# Patient Record
Sex: MALE | Race: WHITE | NOT HISPANIC OR LATINO | Employment: OTHER | URBAN - METROPOLITAN AREA
[De-identification: names, ages, dates, MRNs, and addresses within clinical notes are randomized per-mention and may not be internally consistent; named-entity substitution may affect disease eponyms.]

---

## 2019-10-29 ENCOUNTER — HOSPITAL ENCOUNTER (INPATIENT)
Facility: HOSPITAL | Age: 84
LOS: 9 days | Discharge: NURSING, REHAB OR LONG-TERM CARE | DRG: 871 | End: 2019-11-07
Attending: EMERGENCY MEDICINE | Admitting: INTERNAL MEDICINE
Payer: MEDICARE

## 2019-10-29 DIAGNOSIS — A41.9 SEPSIS, DUE TO UNSPECIFIED ORGANISM, UNSPECIFIED WHETHER ACUTE ORGAN DYSFUNCTION PRESENT: ICD-10-CM

## 2019-10-29 DIAGNOSIS — J43.1 PANLOBULAR EMPHYSEMA: Chronic | ICD-10-CM

## 2019-10-29 DIAGNOSIS — J69.0 ASPIRATION PNEUMONIA OF RIGHT LOWER LOBE DUE TO GASTRIC SECRETIONS: ICD-10-CM

## 2019-10-29 DIAGNOSIS — I95.9 HYPOTENSION: ICD-10-CM

## 2019-10-29 DIAGNOSIS — R79.89 ELEVATED TROPONIN: ICD-10-CM

## 2019-10-29 DIAGNOSIS — T45.511A POISONING BY WARFARIN SODIUM, ACCIDENTAL OR UNINTENTIONAL, INITIAL ENCOUNTER: ICD-10-CM

## 2019-10-29 DIAGNOSIS — R53.81 PHYSICAL DECONDITIONING: ICD-10-CM

## 2019-10-29 DIAGNOSIS — E87.4 METABOLIC ACIDOSIS WITH RESPIRATORY ACIDOSIS: ICD-10-CM

## 2019-10-29 DIAGNOSIS — G20.A1 PARKINSON DISEASE: Chronic | ICD-10-CM

## 2019-10-29 DIAGNOSIS — R06.02 SOB (SHORTNESS OF BREATH): ICD-10-CM

## 2019-10-29 DIAGNOSIS — J96.02 ACUTE RESPIRATORY FAILURE WITH HYPOXIA AND HYPERCAPNIA: ICD-10-CM

## 2019-10-29 DIAGNOSIS — I48.91 ATRIAL FIBRILLATION WITH RVR: ICD-10-CM

## 2019-10-29 DIAGNOSIS — J96.01 ACUTE RESPIRATORY FAILURE WITH HYPOXIA AND HYPERCAPNIA: ICD-10-CM

## 2019-10-29 DIAGNOSIS — J18.9 PNEUMONIA OF BOTH LOWER LOBES DUE TO INFECTIOUS ORGANISM: Primary | ICD-10-CM

## 2019-10-29 DIAGNOSIS — J96.01 ACUTE RESPIRATORY FAILURE WITH HYPOXIA: ICD-10-CM

## 2019-10-29 PROBLEM — Z99.11 ON MECHANICALLY ASSISTED VENTILATION: Status: ACTIVE | Noted: 2019-10-29

## 2019-10-29 LAB
ALBUMIN SERPL BCP-MCNC: 4.1 G/DL (ref 3.5–5.2)
ALLENS TEST: ABNORMAL
ALP SERPL-CCNC: 139 U/L (ref 55–135)
ALT SERPL W/O P-5'-P-CCNC: 23 U/L (ref 10–44)
ANION GAP SERPL CALC-SCNC: 8 MMOL/L (ref 8–16)
AST SERPL-CCNC: 42 U/L (ref 10–40)
BACTERIA #/AREA URNS HPF: ABNORMAL /HPF
BASOPHILS # BLD AUTO: 0.01 K/UL (ref 0–0.2)
BASOPHILS NFR BLD: 0.1 % (ref 0–1.9)
BILIRUB SERPL-MCNC: 1.3 MG/DL (ref 0.1–1)
BILIRUB UR QL STRIP: NEGATIVE
BNP SERPL-MCNC: 353 PG/ML (ref 0–99)
BUN SERPL-MCNC: 38 MG/DL (ref 8–23)
CALCIUM SERPL-MCNC: 9.3 MG/DL (ref 8.7–10.5)
CHLORIDE SERPL-SCNC: 110 MMOL/L (ref 95–110)
CLARITY UR: CLEAR
CO2 SERPL-SCNC: 25 MMOL/L (ref 23–29)
COLOR UR: YELLOW
CREAT SERPL-MCNC: 1.3 MG/DL (ref 0.5–1.4)
DELSYS: ABNORMAL
DIFFERENTIAL METHOD: ABNORMAL
EOSINOPHIL # BLD AUTO: 0.2 K/UL (ref 0–0.5)
EOSINOPHIL NFR BLD: 2 % (ref 0–8)
EP: 8
EP: 8
ERYTHROCYTE [DISTWIDTH] IN BLOOD BY AUTOMATED COUNT: 14 % (ref 11.5–14.5)
ERYTHROCYTE [SEDIMENTATION RATE] IN BLOOD BY WESTERGREN METHOD: 18 MM/H
ERYTHROCYTE [SEDIMENTATION RATE] IN BLOOD BY WESTERGREN METHOD: 35 MM/H
EST. GFR  (AFRICAN AMERICAN): 57.1 ML/MIN/1.73 M^2
EST. GFR  (NON AFRICAN AMERICAN): 49.4 ML/MIN/1.73 M^2
FIO2: 45
FIO2: 80
FIO2: 90
GLUCOSE SERPL-MCNC: 101 MG/DL (ref 70–110)
GLUCOSE UR QL STRIP: NEGATIVE
HCO3 UR-SCNC: 20 MMOL/L (ref 24–28)
HCO3 UR-SCNC: 23.1 MMOL/L (ref 24–28)
HCO3 UR-SCNC: 23.2 MMOL/L (ref 24–28)
HCT VFR BLD AUTO: 44.5 % (ref 40–54)
HGB BLD-MCNC: 14.1 G/DL (ref 14–18)
HGB UR QL STRIP: ABNORMAL
HYALINE CASTS #/AREA URNS LPF: 1 /LPF
IMM GRANULOCYTES # BLD AUTO: 0.01 K/UL (ref 0–0.04)
IMM GRANULOCYTES NFR BLD AUTO: 0.1 % (ref 0–0.5)
INR PPP: 2.9 (ref 0.8–1.2)
IP: 16
IP: 16
KETONES UR QL STRIP: NEGATIVE
LACTATE SERPL-SCNC: 2.5 MMOL/L (ref 0.5–2.2)
LACTATE SERPL-SCNC: 2.7 MMOL/L (ref 0.5–2.2)
LEUKOCYTE ESTERASE UR QL STRIP: NEGATIVE
LYMPHOCYTES # BLD AUTO: 0.5 K/UL (ref 1–4.8)
LYMPHOCYTES NFR BLD: 5.3 % (ref 18–48)
MCH RBC QN AUTO: 32.6 PG (ref 27–31)
MCHC RBC AUTO-ENTMCNC: 31.7 G/DL (ref 32–36)
MCV RBC AUTO: 103 FL (ref 82–98)
MICROSCOPIC COMMENT: ABNORMAL
MIN VOL: 31
MODE: ABNORMAL
MONOCYTES # BLD AUTO: 0.2 K/UL (ref 0.3–1)
MONOCYTES NFR BLD: 1.9 % (ref 4–15)
NEUTROPHILS # BLD AUTO: 8.2 K/UL (ref 1.8–7.7)
NEUTROPHILS NFR BLD: 90.6 % (ref 38–73)
NITRITE UR QL STRIP: NEGATIVE
NRBC BLD-RTO: 0 /100 WBC
PCO2 BLDA: 41.7 MMHG (ref 35–45)
PCO2 BLDA: 53.4 MMHG (ref 35–45)
PCO2 BLDA: 58.8 MMHG (ref 35–45)
PEEP: 5
PH SMN: 7.2 [PH] (ref 7.35–7.45)
PH SMN: 7.24 [PH] (ref 7.35–7.45)
PH SMN: 7.29 [PH] (ref 7.35–7.45)
PH UR STRIP: 6 [PH] (ref 5–8)
PLATELET # BLD AUTO: 155 K/UL (ref 150–350)
PMV BLD AUTO: 9.4 FL (ref 9.2–12.9)
PO2 BLDA: 223 MMHG (ref 80–100)
PO2 BLDA: 87 MMHG (ref 80–100)
PO2 BLDA: 89 MMHG (ref 80–100)
POC BE: -4 MMOL/L
POC BE: -5 MMOL/L
POC BE: -7 MMOL/L
POC SATURATED O2: 100 % (ref 95–100)
POC SATURATED O2: 94 % (ref 95–100)
POC SATURATED O2: 95 % (ref 95–100)
POTASSIUM SERPL-SCNC: 4.7 MMOL/L (ref 3.5–5.1)
PROCALCITONIN SERPL IA-MCNC: 1.49 NG/ML
PROT SERPL-MCNC: 7.8 G/DL (ref 6–8.4)
PROT UR QL STRIP: ABNORMAL
PROTHROMBIN TIME: 30.3 SEC (ref 9–12.5)
RBC # BLD AUTO: 4.33 M/UL (ref 4.6–6.2)
RBC #/AREA URNS HPF: >100 /HPF (ref 0–4)
SAMPLE: ABNORMAL
SITE: ABNORMAL
SODIUM SERPL-SCNC: 143 MMOL/L (ref 136–145)
SP GR UR STRIP: >=1.03 (ref 1–1.03)
SPONT RATE: 36
SQUAMOUS #/AREA URNS HPF: 2 /HPF
TROPONIN I SERPL DL<=0.01 NG/ML-MCNC: 0.03 NG/ML (ref 0–0.03)
TROPONIN I SERPL DL<=0.01 NG/ML-MCNC: 0.16 NG/ML (ref 0–0.03)
URN SPEC COLLECT METH UR: ABNORMAL
UROBILINOGEN UR STRIP-ACNC: 1 EU/DL
VT: 450
WBC # BLD AUTO: 9.08 K/UL (ref 3.9–12.7)
WBC #/AREA URNS HPF: 3 /HPF (ref 0–5)

## 2019-10-29 PROCEDURE — 27000190 HC CPAP FULL FACE MASK W/VALVE: Mod: ER

## 2019-10-29 PROCEDURE — 99285 EMERGENCY DEPT VISIT HI MDM: CPT | Mod: 25,ER

## 2019-10-29 PROCEDURE — 84484 ASSAY OF TROPONIN QUANT: CPT | Mod: 91,ER

## 2019-10-29 PROCEDURE — 96365 THER/PROPH/DIAG IV INF INIT: CPT | Mod: ER

## 2019-10-29 PROCEDURE — 87205 SMEAR GRAM STAIN: CPT

## 2019-10-29 PROCEDURE — 99291 PR CRITICAL CARE, E/M 30-74 MINUTES: ICD-10-PCS | Mod: ,,, | Performed by: NURSE PRACTITIONER

## 2019-10-29 PROCEDURE — 80053 COMPREHEN METABOLIC PANEL: CPT | Mod: ER

## 2019-10-29 PROCEDURE — 51702 INSERT TEMP BLADDER CATH: CPT | Mod: ER

## 2019-10-29 PROCEDURE — 25000242 PHARM REV CODE 250 ALT 637 W/ HCPCS: Performed by: FAMILY MEDICINE

## 2019-10-29 PROCEDURE — 63600175 PHARM REV CODE 636 W HCPCS: Performed by: FAMILY MEDICINE

## 2019-10-29 PROCEDURE — 82803 BLOOD GASES ANY COMBINATION: CPT

## 2019-10-29 PROCEDURE — 94002 VENT MGMT INPAT INIT DAY: CPT | Mod: ER

## 2019-10-29 PROCEDURE — 25000003 PHARM REV CODE 250: Performed by: INTERNAL MEDICINE

## 2019-10-29 PROCEDURE — 82803 BLOOD GASES ANY COMBINATION: CPT | Mod: ER

## 2019-10-29 PROCEDURE — 99900035 HC TECH TIME PER 15 MIN (STAT)

## 2019-10-29 PROCEDURE — 93010 ELECTROCARDIOGRAM REPORT: CPT | Mod: ,,, | Performed by: INTERNAL MEDICINE

## 2019-10-29 PROCEDURE — 93010 EKG 12-LEAD: ICD-10-PCS | Mod: ,,, | Performed by: INTERNAL MEDICINE

## 2019-10-29 PROCEDURE — 85025 COMPLETE CBC W/AUTO DIFF WBC: CPT | Mod: ER

## 2019-10-29 PROCEDURE — 25000003 PHARM REV CODE 250: Mod: ER | Performed by: EMERGENCY MEDICINE

## 2019-10-29 PROCEDURE — 63600175 PHARM REV CODE 636 W HCPCS: Mod: ER | Performed by: EMERGENCY MEDICINE

## 2019-10-29 PROCEDURE — 94640 AIRWAY INHALATION TREATMENT: CPT

## 2019-10-29 PROCEDURE — 99900035 HC TECH TIME PER 15 MIN (STAT): Mod: ER

## 2019-10-29 PROCEDURE — 25000242 PHARM REV CODE 250 ALT 637 W/ HCPCS: Performed by: NURSE PRACTITIONER

## 2019-10-29 PROCEDURE — 94640 AIRWAY INHALATION TREATMENT: CPT | Mod: ER

## 2019-10-29 PROCEDURE — 81000 URINALYSIS NONAUTO W/SCOPE: CPT

## 2019-10-29 PROCEDURE — 83880 ASSAY OF NATRIURETIC PEPTIDE: CPT | Mod: ER

## 2019-10-29 PROCEDURE — 36415 COLL VENOUS BLD VENIPUNCTURE: CPT

## 2019-10-29 PROCEDURE — 96366 THER/PROPH/DIAG IV INF ADDON: CPT | Mod: ER

## 2019-10-29 PROCEDURE — 83605 ASSAY OF LACTIC ACID: CPT | Mod: ER

## 2019-10-29 PROCEDURE — 99900026 HC AIRWAY MAINTENANCE (STAT)

## 2019-10-29 PROCEDURE — 94660 CPAP INITIATION&MGMT: CPT | Mod: ER

## 2019-10-29 PROCEDURE — 25000242 PHARM REV CODE 250 ALT 637 W/ HCPCS: Mod: ER | Performed by: EMERGENCY MEDICINE

## 2019-10-29 PROCEDURE — 36600 WITHDRAWAL OF ARTERIAL BLOOD: CPT | Mod: ER

## 2019-10-29 PROCEDURE — 84145 PROCALCITONIN (PCT): CPT | Mod: ER

## 2019-10-29 PROCEDURE — 20000000 HC ICU ROOM

## 2019-10-29 PROCEDURE — 87070 CULTURE OTHR SPECIMN AEROBIC: CPT

## 2019-10-29 PROCEDURE — 36600 WITHDRAWAL OF ARTERIAL BLOOD: CPT

## 2019-10-29 PROCEDURE — 96375 TX/PRO/DX INJ NEW DRUG ADDON: CPT | Mod: ER

## 2019-10-29 PROCEDURE — 99291 CRITICAL CARE FIRST HOUR: CPT | Mod: ,,, | Performed by: NURSE PRACTITIONER

## 2019-10-29 PROCEDURE — 93005 ELECTROCARDIOGRAM TRACING: CPT | Mod: ER

## 2019-10-29 PROCEDURE — 25000003 PHARM REV CODE 250: Performed by: FAMILY MEDICINE

## 2019-10-29 PROCEDURE — 63600175 PHARM REV CODE 636 W HCPCS: Performed by: NURSE PRACTITIONER

## 2019-10-29 PROCEDURE — 85610 PROTHROMBIN TIME: CPT

## 2019-10-29 PROCEDURE — S0030 INJECTION, METRONIDAZOLE: HCPCS | Performed by: FAMILY MEDICINE

## 2019-10-29 PROCEDURE — S0028 INJECTION, FAMOTIDINE, 20 MG: HCPCS | Performed by: FAMILY MEDICINE

## 2019-10-29 PROCEDURE — 87040 BLOOD CULTURE FOR BACTERIA: CPT

## 2019-10-29 RX ORDER — BRIMONIDINE TARTRATE 1.5 MG/ML
1 SOLUTION/ DROPS OPHTHALMIC 3 TIMES DAILY
COMMUNITY

## 2019-10-29 RX ORDER — SODIUM CHLORIDE, SODIUM LACTATE, POTASSIUM CHLORIDE, CALCIUM CHLORIDE 600; 310; 30; 20 MG/100ML; MG/100ML; MG/100ML; MG/100ML
INJECTION, SOLUTION INTRAVENOUS CONTINUOUS
Status: DISCONTINUED | OUTPATIENT
Start: 2019-10-29 | End: 2019-10-31

## 2019-10-29 RX ORDER — CHLORHEXIDINE GLUCONATE ORAL RINSE 1.2 MG/ML
15 SOLUTION DENTAL 2 TIMES DAILY
Status: DISCONTINUED | OUTPATIENT
Start: 2019-10-29 | End: 2019-10-31

## 2019-10-29 RX ORDER — IPRATROPIUM BROMIDE AND ALBUTEROL SULFATE 2.5; .5 MG/3ML; MG/3ML
3 SOLUTION RESPIRATORY (INHALATION) EVERY 4 HOURS
Status: DISCONTINUED | OUTPATIENT
Start: 2019-10-29 | End: 2019-11-01

## 2019-10-29 RX ORDER — SODIUM CHLORIDE, SODIUM LACTATE, POTASSIUM CHLORIDE, CALCIUM CHLORIDE 600; 310; 30; 20 MG/100ML; MG/100ML; MG/100ML; MG/100ML
INJECTION, SOLUTION INTRAVENOUS CONTINUOUS
Status: DISCONTINUED | OUTPATIENT
Start: 2019-10-29 | End: 2019-10-29

## 2019-10-29 RX ORDER — METOPROLOL TARTRATE 1 MG/ML
5 INJECTION, SOLUTION INTRAVENOUS EVERY 6 HOURS PRN
Status: DISCONTINUED | OUTPATIENT
Start: 2019-10-29 | End: 2019-11-07 | Stop reason: HOSPADM

## 2019-10-29 RX ORDER — WARFARIN SODIUM 5 MG/1
5 TABLET ORAL DAILY
Status: DISCONTINUED | OUTPATIENT
Start: 2019-10-29 | End: 2019-10-30

## 2019-10-29 RX ORDER — CARBIDOPA AND LEVODOPA 25; 100 MG/1; MG/1
TABLET ORAL
Status: ON HOLD | COMMUNITY
Start: 2019-09-12 | End: 2019-11-07 | Stop reason: SDUPTHER

## 2019-10-29 RX ORDER — LISINOPRIL 20 MG/1
TABLET ORAL
Status: ON HOLD | COMMUNITY
Start: 2019-08-26 | End: 2019-11-07 | Stop reason: SDUPTHER

## 2019-10-29 RX ORDER — FLUTICASONE PROPIONATE 110 UG/1
1 AEROSOL, METERED RESPIRATORY (INHALATION) 2 TIMES DAILY
Status: ON HOLD | COMMUNITY
End: 2019-11-17 | Stop reason: HOSPADM

## 2019-10-29 RX ORDER — ALBUTEROL SULFATE 0.83 MG/ML
2.5 SOLUTION RESPIRATORY (INHALATION) EVERY 6 HOURS PRN
Status: ON HOLD | COMMUNITY
End: 2019-11-17 | Stop reason: HOSPADM

## 2019-10-29 RX ORDER — PROPOFOL 10 MG/ML
5 INJECTION, EMULSION INTRAVENOUS
Status: COMPLETED | OUTPATIENT
Start: 2019-10-29 | End: 2019-10-29

## 2019-10-29 RX ORDER — CARBIDOPA AND LEVODOPA 25; 100 MG/1; MG/1
1 TABLET ORAL 3 TIMES DAILY
Status: DISCONTINUED | OUTPATIENT
Start: 2019-10-29 | End: 2019-11-07 | Stop reason: HOSPADM

## 2019-10-29 RX ORDER — DILTIAZEM HYDROCHLORIDE 5 MG/ML
0.25 INJECTION INTRAVENOUS
Status: COMPLETED | OUTPATIENT
Start: 2019-10-29 | End: 2019-10-29

## 2019-10-29 RX ORDER — CHOLECALCIFEROL (VITAMIN D3) 25 MCG
1000 TABLET ORAL DAILY
Status: DISCONTINUED | OUTPATIENT
Start: 2019-10-30 | End: 2019-11-07 | Stop reason: HOSPADM

## 2019-10-29 RX ORDER — ETOMIDATE 2 MG/ML
20 INJECTION INTRAVENOUS
Status: COMPLETED | OUTPATIENT
Start: 2019-10-29 | End: 2019-10-29

## 2019-10-29 RX ORDER — PROPOFOL 10 MG/ML
5 INJECTION, EMULSION INTRAVENOUS CONTINUOUS
Status: DISCONTINUED | OUTPATIENT
Start: 2019-10-29 | End: 2019-10-31

## 2019-10-29 RX ORDER — CEFEPIME HYDROCHLORIDE 2 G/50ML
2 INJECTION, SOLUTION INTRAVENOUS
Status: DISCONTINUED | OUTPATIENT
Start: 2019-10-29 | End: 2019-10-30

## 2019-10-29 RX ORDER — METRONIDAZOLE 500 MG/100ML
500 INJECTION, SOLUTION INTRAVENOUS
Status: DISCONTINUED | OUTPATIENT
Start: 2019-10-29 | End: 2019-11-05

## 2019-10-29 RX ORDER — FUROSEMIDE 20 MG/1
TABLET ORAL
Status: ON HOLD | COMMUNITY
Start: 2019-08-29 | End: 2019-11-07 | Stop reason: SDUPTHER

## 2019-10-29 RX ORDER — LEVOFLOXACIN 5 MG/ML
750 INJECTION, SOLUTION INTRAVENOUS
Status: COMPLETED | OUTPATIENT
Start: 2019-10-29 | End: 2019-10-29

## 2019-10-29 RX ORDER — CHOLECALCIFEROL (VITAMIN D3) 25 MCG
1000 TABLET ORAL DAILY
Status: ON HOLD | COMMUNITY
End: 2019-11-17 | Stop reason: HOSPADM

## 2019-10-29 RX ORDER — IPRATROPIUM BROMIDE AND ALBUTEROL SULFATE 2.5; .5 MG/3ML; MG/3ML
3 SOLUTION RESPIRATORY (INHALATION)
Status: COMPLETED | OUTPATIENT
Start: 2019-10-29 | End: 2019-10-29

## 2019-10-29 RX ORDER — FAMOTIDINE 10 MG/ML
20 INJECTION INTRAVENOUS 2 TIMES DAILY
Status: DISCONTINUED | OUTPATIENT
Start: 2019-10-29 | End: 2019-10-30 | Stop reason: DRUGHIGH

## 2019-10-29 RX ORDER — WARFARIN SODIUM 5 MG/1
TABLET ORAL
Status: ON HOLD | COMMUNITY
Start: 2019-09-13 | End: 2019-11-07 | Stop reason: HOSPADM

## 2019-10-29 RX ORDER — FENTANYL CITRAT/DEXTROSE 5%/PF 100 MCG/10
PATIENT CONTROLLED ANALGESIA SYRINGE INTRAVENOUS CONTINUOUS
Status: DISCONTINUED | OUTPATIENT
Start: 2019-10-29 | End: 2019-10-31

## 2019-10-29 RX ADMIN — WARFARIN SODIUM 5 MG: 5 TABLET ORAL at 09:10

## 2019-10-29 RX ADMIN — FAMOTIDINE 20 MG: 10 INJECTION INTRAVENOUS at 09:10

## 2019-10-29 RX ADMIN — Medication 50 MCG/HR: at 04:10

## 2019-10-29 RX ADMIN — IPRATROPIUM BROMIDE AND ALBUTEROL SULFATE 3 ML: .5; 3 SOLUTION RESPIRATORY (INHALATION) at 07:10

## 2019-10-29 RX ADMIN — LEVOFLOXACIN 750 MG: 750 INJECTION, SOLUTION INTRAVENOUS at 09:10

## 2019-10-29 RX ADMIN — ETOMIDATE 20 MG: 2 INJECTION INTRAVENOUS at 11:10

## 2019-10-29 RX ADMIN — SODIUM CHLORIDE, SODIUM LACTATE, POTASSIUM CHLORIDE, AND CALCIUM CHLORIDE 250 ML: .6; .31; .03; .02 INJECTION, SOLUTION INTRAVENOUS at 06:10

## 2019-10-29 RX ADMIN — PROPOFOL 5 MCG/KG/MIN: 10 INJECTION, EMULSION INTRAVENOUS at 11:10

## 2019-10-29 RX ADMIN — LORAZEPAM 1 MG: 2 INJECTION INTRAMUSCULAR; INTRAVENOUS at 09:10

## 2019-10-29 RX ADMIN — METRONIDAZOLE 500 MG: 500 INJECTION, SOLUTION INTRAVENOUS at 05:10

## 2019-10-29 RX ADMIN — IPRATROPIUM BROMIDE AND ALBUTEROL SULFATE 3 ML: .5; 3 SOLUTION RESPIRATORY (INHALATION) at 11:10

## 2019-10-29 RX ADMIN — CARBIDOPA AND LEVODOPA 1 TABLET: 25; 100 TABLET ORAL at 09:10

## 2019-10-29 RX ADMIN — SODIUM CHLORIDE 1000 ML: 0.9 INJECTION, SOLUTION INTRAVENOUS at 09:10

## 2019-10-29 RX ADMIN — CHLORHEXIDINE GLUCONATE 0.12% ORAL RINSE 15 ML: 1.2 LIQUID ORAL at 09:10

## 2019-10-29 RX ADMIN — PROPOFOL 10 MCG/KG/MIN: 10 INJECTION, EMULSION INTRAVENOUS at 04:10

## 2019-10-29 RX ADMIN — SODIUM CHLORIDE, SODIUM LACTATE, POTASSIUM CHLORIDE, AND CALCIUM CHLORIDE: .6; .31; .03; .02 INJECTION, SOLUTION INTRAVENOUS at 11:10

## 2019-10-29 RX ADMIN — IPRATROPIUM BROMIDE AND ALBUTEROL SULFATE 3 ML: .5; 3 SOLUTION RESPIRATORY (INHALATION) at 08:10

## 2019-10-29 RX ADMIN — CEFEPIME HYDROCHLORIDE 2 G: 2 INJECTION, SOLUTION INTRAVENOUS at 07:10

## 2019-10-29 RX ADMIN — DILTIAZEM HYDROCHLORIDE 19.5 MG: 5 INJECTION INTRAVENOUS at 08:10

## 2019-10-29 RX ADMIN — IPRATROPIUM BROMIDE AND ALBUTEROL SULFATE 3 ML: .5; 3 SOLUTION RESPIRATORY (INHALATION) at 05:10

## 2019-10-29 RX ADMIN — VANCOMYCIN HYDROCHLORIDE 2250 MG: 100 INJECTION, POWDER, LYOPHILIZED, FOR SOLUTION INTRAVENOUS at 06:10

## 2019-10-29 RX ADMIN — SODIUM CHLORIDE, SODIUM LACTATE, POTASSIUM CHLORIDE, AND CALCIUM CHLORIDE: .6; .31; .03; .02 INJECTION, SOLUTION INTRAVENOUS at 06:10

## 2019-10-29 NOTE — PROGRESS NOTES
Patient and spouse arrived per EMS at this time. Patient intubated and sedated. Following commands. NP at bedside. Orders to be implemented at this time. Will continue to monitor and update as necessary. See VS and assessment for details.

## 2019-10-29 NOTE — CONSULTS
[facility] -   Critical Care Medicine  Consult Note    Patient Name: Shaq Huffman  MRN: [id number]  Admission Date: 10/29/2019  Hospital Length of Stay: 0 days  Code Status: No Order  Attending Physician: Josesito Franco MD   Primary Care Provider: Provider Notinsystem   Principal Problem: Acute respiratory failure with hypoxia and hypercapnia      Subjective:     HPI:  Frail appearing 86 year old male with PMH including COPD, atrial fib on coumadin, parkinson's, and glaucoma presented to ED at [facility] via EMS  Pt traveling via [place] boat cruise with significant other from [place]  SO reports complaints of reflux, fullness, and discomfort after dinner last evening; then this am woke with increased coughing and shortly became less responsive to conversation when she called for assistance from onboard EMT  ED evaluation revealed atrial fib with RVR 150s, RR 40s on trial BiPap @ 80%  Intubated after failing trial of BiPap; CXR shows RLL infiltrate concerning for aspiration in addition to coarse interstitial markings suspicious for underlying ILD/fibrosis/or scarring  Lab significant for creatinine 1.3; troponin initial 0.029 sienna to 0.159; ; lactic acid initial 2.7 decreased to 2.5; procalcitonin 1.49; initial abg respiratory acidosis with hypoxemia and hypercapnia    Hospital/ICU Course:  Admitted to ICU with OETT on mechanical ventilation with propofol sedation and marginal BP    Past Medical History:   Diagnosis Date    A-fib     COPD (chronic obstructive pulmonary disease)        No past surgical history on file.    Review of patient's allergies indicates:  No Known Allergies    Family History     None        Tobacco Use    Smoking status: Not on file   Substance and Sexual Activity    Alcohol use: Not on file    Drug use: Not on file    Sexual activity: Not on file         Review of Systems   Unable to perform ROS: Intubated     Objective:     Vital Signs (Most  Recent):  Temp: 99.3 °F (37.4 °C) (10/29/19 1614)  Pulse: 94 (10/29/19 1723)  Resp: (!) 31 (10/29/19 1723)  BP: (!) 87/69 (10/29/19 1715)  SpO2: 98 % (10/29/19 1723) Vital Signs (24h Range):  Temp:  [99.1 °F (37.3 °C)-99.6 °F (37.6 °C)] 99.3 °F (37.4 °C)  Pulse:  [] 94  Resp:  [31-49] 31  SpO2:  [84 %-100 %] 98 %  BP: ()/(55-88) 87/69     Weight: 77.1 kg (170 lb)(unable to stand due to SOB)  There is no height or weight on file to calculate BMI.      Intake/Output Summary (Last 24 hours) at 10/29/2019 1734  Last data filed at 10/29/2019 1730  Gross per 24 hour   Intake 1172.06 ml   Output 195 ml   Net 977.06 ml       Physical Exam   Constitutional: He has a sickly appearance. He appears ill. No distress. He is sedated and intubated.   Frail appearing with barrel chest   HENT:   Head: Atraumatic.   Eyes: Pupils are equal, round, and reactive to light. Conjunctivae are normal.   Neck: No JVD present. No tracheal deviation present.   Cardiovascular: Normal rate. An irregularly irregular rhythm present.   Pulses:       Radial pulses are 2+ on the right side, and 2+ on the left side.        Dorsalis pedis pulses are 2+ on the right side, and 2+ on the left side.   Pulmonary/Chest: He is intubated. He has decreased breath sounds. He has no wheezes. He has rhonchi in the right lower field. He has no rales.   Abdominal: Soft. Bowel sounds are normal. He exhibits no distension.   Musculoskeletal: He exhibits no edema.   Skin: Skin is warm and dry. Capillary refill takes less than 2 seconds. Bruising noted. No cyanosis. Nails show clubbing.       Vents:  Vent Mode: A/C (10/29/19 1723)  Ventilator Initiated: Yes (10/29/19 1143)  Set Rate: 18 bmp (10/29/19 1723)  Vt Set: 450 mL (10/29/19 1723)  Pressure Support: 0 cmH20 (10/29/19 1723)  PEEP/CPAP: 5 cmH20 (10/29/19 1723)  Oxygen Concentration (%): 45 (10/29/19 1723)  Peak Airway Pressure: 23 cmH2O (10/29/19 1723)  Plateau Pressure: 0 cmH20 (10/29/19 1723)  Total Ve:  19.7 mL (10/29/19 1723)  F/VT Ratio<105 (RSBI): (!) (P) 51.16 (10/29/19 1723)    Lines/Drains/Airways     Drain                 NG/OG Tube 10/29/19 1200 [place] sump 16 Fr. Left nostril less than 1 day         Urethral Catheter 10/29/19 1440 16 Fr. less than 1 day          Airway                 Airway - Non-Surgical 10/29/19 1134 Endotracheal Tube less than 1 day          Peripheral Intravenous Line                 Peripheral IV - Single Lumen 10/29/19 0815 20 G Right Forearm less than 1 day         Peripheral IV - Single Lumen 10/29/19 20 G Left Forearm less than 1 day                Significant Labs:    CBC/Anemia Profile:  Recent Labs   Lab 10/29/19  0817   WBC 9.08   HGB 14.1   HCT 44.5      *   RDW 14.0        Chemistries:  Recent Labs   Lab 10/29/19  0817      K 4.7      CO2 25   BUN 38*   CREATININE 1.3   CALCIUM 9.3   ALBUMIN 4.1   PROT 7.8   BILITOT 1.3*   ALKPHOS 139*   ALT 23   AST 42*       Lactic Acid:   Recent Labs   Lab 10/29/19  0918 10/29/19  1254   LACTATE 2.7* 2.5*     Troponin:   Recent Labs   Lab 10/29/19  0817 10/29/19  1446   TROPONINI 0.029* 0.159*     All pertinent labs within the past 24 hours have been reviewed.  ABG  Recent Labs   Lab 10/29/19  1652   PH 7.288*   PO2 87   PCO2 41.7   HCO3 20.0*   BE -7         Significant Imaging:   I have reviewed all pertinent imaging results/findings within the past 24 hours.      ABG  Recent Labs   Lab 10/29/19  1652   PH 7.288*   PO2 87   PCO2 41.7   HCO3 20.0*   BE -7     Assessment/Plan:     Neuro  Parkinson disease  Continue sinemet via NG    Ophtho  Continue home eye drop course    Pulmonary  Panlobular emphysema  No evidence of acute exacerbation  Continue maintenance bronchodilator  Monitor for s/s of exacerbation s/t pneumonia    Aspiration pneumonia of right lower lobe due to gastric secretions  Empiric antimicrobial to start after sputum collected for culture  Monitor temp, wbc, culture info, and cxr daily  Consider  swallow eval post extubation; reported events consistent with a reflux type aspiration however per SO pt has had ST for swallow precautions s/t parkinson's diagnosis but does not strictly follow those precautions    Acute respiratory failure with hypoxia and hypercapnia on mechanical ventilation  S/t pneumonia with underlying COPD  Vent settings reviewed and adjusted per abg  VAP prophylaxis  SAT/SBT in am    Cardiac/Vascular  Elevated troponin  Suspect demand ischemia s/t respiratory distress and atrial fib with RVR   Coumadin anticoagulation  Repeat troponin in am for peak/resolution    Atrial fibrillation with RVR  Rate controlled with optimization of pulmonary distress  ICU cardiac monitoring  INR therapeutic, continue coumadin with pharmacy monitoring and adjustment to maintain anticoagulation    ID  Sepsis  S/t pneumonia  1L IVF in ED;  and lasix home med concerning for heart failure but not endorsed by SO  Will add gentle IV hydration  ICU hemodynamic monitoring  Blood cultures obtained in ED  Send sputum and check UA for completeness  Initiate broad spectrum empiric aspiration pneumonia coverage  Monitor temp, wbc, culture info, and repeat lactate in am        Critical Care Daily Checklist:    A: Awake: RASS Goal/Actual Goal: RASS Goal: -2-->light sedation  Actual:     B: Spontaneous Breathing Trial Performed?     C: SAT & SBT Coordinated?  In am                      D: Delirium: CAM-ICU     E: Early Mobility Performed? ROM   F: Feeding Goal:    Status:     Current Diet Order   Procedures    Diet NPO      AS: Analgesia/Sedation Fentanyl infusion for RASS -2   T: Thromboembolic Prophylaxis coumadin   H: HOB > 300 Yes   U: Stress Ulcer Prophylaxis (if needed) pepcid   G: Glucose Control monitor   B: Bowel Function     I: Indwelling Catheter (Lines & [place]) Necessity reviewed   D: De-escalation of Antimicrobials/Pharmacotherapies reviewed    Plan for the day/ETD As above    Family/Goals of Care: Pending  hospital course anticipate discharge to return home with SO   I have discussed case and plan of care in detail with Dr Fernandez and Dr Guzman; Status and plan of care were discussed with team on multidisciplinary rounds.    Critical Care Time: 65 minutes  Critical secondary to acute hypoxemic and hypercapnic respiratory failure s/t aspiration pneumonia; Critical care was time spent personally by me on the following activities: development of treatment plan with patient or surrogate and bedside caregivers, discussions with consultants, evaluation of patient's response to treatment, examination of patient, ordering and performing treatments and interventions, ordering and review of laboratory studies, ordering and review of radiographic studies, pulse oximetry, re-evaluation of patient's condition. This critical care time did not overlap with that of any other provider or involve time for any procedures.    Thank you for your consult.      JAQUELINE Russell-BC  Critical Care Medicine  [facility]

## 2019-10-29 NOTE — PROGRESS NOTES
[facility] Hospital Medicine  Progress Note    Patient Name: Shaq Huffman  MRN: [id number]  Patient Class: IP- Inpatient   Admission Date: 10/29/2019  Length of Stay: 0 days  Attending Physician: Josesito Franco MD  Primary Care Provider: Provider Notinsystem        Subjective:     Principal Problem:Acute respiratory failure with hypoxia and hypercapnia        HPI:  Patient is an 87 y/o male with a PMH of parkinsons, afib, GERD, COPD presents as transfer from [facility] for respiratory failure. Patient was intubated prior to arrival and history was obtained by partner. She reports her and patient were on a cruise leaving out of [place] and patient developed some breathing difficulty. Cruise healthcare professionals were called patient was taken to [facility]. He was intubated there due to respiratory insufficiency with hypercarbia and transferred to [facility]. She denied any sick contacts or recent hospitalizations.     Overview/Hospital Course:  No notes on file    Past Medical History:   Diagnosis Date    A-fib     COPD (chronic obstructive pulmonary disease)        No past surgical history on file.    Review of patient's allergies indicates:  No Known Allergies    No current facility-administered medications on file prior to encounter.      Current Outpatient Medications on File Prior to Encounter   Medication Sig    albuterol (PROVENTIL) 2.5 mg /3 mL (0.083 %) nebulizer solution Take 2.5 mg by nebulization every 6 (six) hours as needed for Wheezing. Rescue    brimonidine 0.15 % OPTH DROP (ALPHAGAN) 0.15 % ophthalmic solution 1 drop 3 (three) times daily.    brinzolamide/brimonidine tart (SIMBRINZA OPHT) Apply to eye.    carbidopa-levodopa  mg (SINEMET)  mg per tablet     fluticasone propionate (FLOVENT HFA) 110 mcg/actuation inhaler Inhale 1 puff into the lungs 2 (two) times daily. Controller    furosemide (LASIX) 20 MG tablet     lisinopril  (PRINIVIL,ZESTRIL) 20 MG tablet     vit C/vit E ac/lut/copper/zinc (PRESERVISION LUTEIN ORAL) Take by mouth.    vitamin D (VITAMIN D3) 1000 units Tab Take 1,000 Units by mouth once daily.    warfarin (COUMADIN) 5 MG tablet     bevacizumab (AVASTIN) 2.5 mg/0.10 mL injection Inject into the eye.     Family History     None        Tobacco Use    Smoking status: Not on file   Substance and Sexual Activity    Alcohol use: Not on file    Drug use: Not on file    Sexual activity: Not on file     Review of Systems   Unable to perform ROS: Intubated     Objective:     Vital Signs (Most Recent):  Temp: 99.3 °F (37.4 °C) (10/29/19 1614)  Pulse: (!) 118 (10/29/19 1730)  Resp: (!) 31 (10/29/19 1730)  BP: (!) 100/59 (10/29/19 1730)  SpO2: 98 % (10/29/19 1730) Vital Signs (24h Range):  Temp:  [99.1 °F (37.3 °C)-99.6 °F (37.6 °C)] 99.3 °F (37.4 °C)  Pulse:  [] 118  Resp:  [31-49] 31  SpO2:  [84 %-100 %] 98 %  BP: ()/(55-88) 100/59     Weight: 77.1 kg (170 lb)(unable to stand due to SOB)  There is no height or weight on file to calculate BMI.    Physical Exam   Constitutional: He appears well-developed and well-nourished. No distress.   intubated   HENT:   Head: Normocephalic and atraumatic.   Cardiovascular: Exam reveals no gallop and no friction rub.   No murmur heard.  Irregular irregular   Pulmonary/Chest: Effort normal. No stridor. No respiratory distress. He has no wheezes. He has rales (RLL).   Abdominal: Soft. Bowel sounds are normal. He exhibits no distension and no mass. There is no tenderness. There is no guarding.   Musculoskeletal: He exhibits no edema.   Neurological:   sedated   Skin: Skin is warm. No rash noted. He is not diaphoretic.           Significant Labs:   Recent Lab Results       10/29/19  1652   10/29/19  1643   10/29/19  1446   10/29/19  1254   10/29/19  1054        Procalcitonin               Albumin               Alkaline Phosphatase               Allens Test Pass       N/A     ALT                Anion Gap               AST               Baso #               Basophil%               BILIRUBIN TOTAL               BNP               Site RR       RF     BUN, Bld               Calcium               Chloride               CO2               Creatinine               DelSys Adult Vent       CPAP/BiPAP     Differential Method               eGFR if                eGFR if non                Eos #               Eosinophil%               EP         8     FiO2 45       90     Glucose               Gran # (ANC)               Gran%               Hematocrit               Hemoglobin               Immature Grans (Abs)               Immature Granulocytes               Coumadin Monitoring INR   2.9  Comment:  Coumadin Therapy:  2.0 - 3.0 for INR for all indicators except mechanical heart valves  and antiphospholipid syndromes which should use 2.5 - 3.5.             IP         16     Lactate, Giovanni       2.5  Comment:  Falsely low lactic acid results can be found in samples   containing >=13.0 mg/dL total bilirubin and/or >=3.5 mg/dL   direct bilirubin.         Lymph #               Lymph%               MCH               MCHC               MCV               Min Vol               Mode AC/PRVC       BiPAP     Mono #               Mono%               MPV               nRBC               PEEP 5             Platelets               POC BE -7       -4     POC HCO3 20.0       23.1     POC PCO2 41.7       53.4     POC PH 7.288       7.243     POC PO2 87       223     POC SATURATED O2 95       100     Potassium               PROTEIN TOTAL               Protime   30.3           Rate 18       35     RBC               RDW               Sample ARTERIAL       ARTERIAL     Sodium               Spont Rate               Troponin I     0.159  Comment:  The reference interval for Troponin I represents the 99th percentile   cutoff   for our facility and is consistent with 3rd generation assay   performance.            Vt 450             WBC                                10/29/19  0918   10/29/19  0839   10/29/19  0817        Procalcitonin 1.49  Comment:  A concentration < 0.25 ng/mL represents a low risk bacterial   infection.  Procalcitonin may not be accurate among patients with localized   infection, recent trauma or major surgery, immunosuppressed state,   invasive fungal infection, renal dysfunction. Decisions regarding   initiation or continuation of antibiotic therapy should not be based   solely on procalcitonin levels.           Albumin     4.1     Alkaline Phosphatase     139     Allens Test   Pass       ALT     23     Anion Gap     8     AST     42     Baso #     0.01     Basophil%     0.1     BILIRUBIN TOTAL     1.3  Comment:  For infants and newborns, interpretation of results should be based  on gestational age, weight and in agreement with clinical  observations.  Premature Infant recommended reference ranges:  Up to 24 hours.............<8.0 mg/dL  Up to 48 hours............<12.0 mg/dL  3-5 days..................<15.0 mg/dL  6-29 days.................<15.0 mg/dL       BNP     353  Comment:  Values of less than 100 pg/ml are consistent with non-CHF populations.     Site   RR       BUN, Bld     38     Calcium     9.3     Chloride     110     CO2     25     Creatinine     1.3     DelSys   CPAP/BiPAP       Differential Method     Automated     eGFR if      57.1     eGFR if non      49.4  Comment:  Calculation used to obtain the estimated glomerular filtration  rate (eGFR) is the CKD-EPI equation.        Eos #     0.2     Eosinophil%     2.0     EP   8       FiO2   80       Glucose     101     Gran # (ANC)     8.2     Gran%     90.6     Hematocrit     44.5     Hemoglobin     14.1     Immature Grans (Abs)     0.01  Comment:  Mild elevation in immature granulocytes is non specific and   can be seen in a variety of conditions including stress response,   acute inflammation, trauma  and pregnancy. Correlation with other   laboratory and clinical findings is essential.       Immature Granulocytes     0.1     Coumadin Monitoring INR           IP   16       Lactate, Giovanni 2.7  Comment:  Falsely low lactic acid results can be found in samples   containing >=13.0 mg/dL total bilirubin and/or >=3.5 mg/dL   direct bilirubin.           Lymph #     0.5     Lymph%     5.3     MCH     32.6     MCHC     31.7     MCV     103     Min Vol   31       Mode   BiPAP       Mono #     0.2     Mono%     1.9     MPV     9.4     nRBC     0     PEEP           Platelets     155     POC BE   -5       POC HCO3   23.2       POC PCO2   58.8       POC PH   7.204       POC PO2   89       POC SATURATED O2   94       Potassium     4.7     PROTEIN TOTAL     7.8     Protime           Rate           RBC     4.33     RDW     14.0     Sample   ARTERIAL       Sodium     143     Spont Rate   36       Troponin I     0.029  Comment:  The reference interval for Troponin I represents the 99th percentile   cutoff   for our facility and is consistent with 3rd generation assay   performance.       Vt           WBC     9.08           Significant Imaging: I have reviewed all pertinent imaging results/findings within the past 24 hours.      Assessment/Plan:      * Acute respiratory failure with hypoxia and hypercapnia on mechanical ventilation  10/29:  Likely 2/2 to aspiration PNA  Sputum culture pending  Blood culture pending  On vanc, cefepime, and flagyl  Continue to wean vent as tolerated   Reviewed blood gas           Parkinson disease  10/29:  Resume home meds      Atrial fibrillation with RVR  10/29:  Not on rate control medical medications at home  HR ok for now  On coumadin   Pharm to dose   Metoprolol IV PRN         VTE Risk Mitigation (From admission, onward)         Ordered     Place sequential compression device  Until discontinued      10/29/19 1630     IP VTE HIGH RISK PATIENT  Once      10/29/19 1630                Critical care  time spent on the evaluation and treatment of severe organ dysfunction, review of pertinent labs and imaging studies, discussions with consulting providers and discussions with patient/family: 35 minutes.      Allan Guzman MD  Department of Hospital Medicine   [facility]

## 2019-10-29 NOTE — SUBJECTIVE & OBJECTIVE
Past Medical History:   Diagnosis Date    A-fib     COPD (chronic obstructive pulmonary disease)        No past surgical history on file.    Review of patient's allergies indicates:  No Known Allergies    Family History     None        Tobacco Use    Smoking status: Not on file   Substance and Sexual Activity    Alcohol use: Not on file    Drug use: Not on file    Sexual activity: Not on file         Review of Systems   Unable to perform ROS: Intubated     Objective:     Vital Signs (Most Recent):  Temp: 99.3 °F (37.4 °C) (10/29/19 1614)  Pulse: 94 (10/29/19 1723)  Resp: (!) 31 (10/29/19 1723)  BP: (!) 87/69 (10/29/19 1715)  SpO2: 98 % (10/29/19 1723) Vital Signs (24h Range):  Temp:  [99.1 °F (37.3 °C)-99.6 °F (37.6 °C)] 99.3 °F (37.4 °C)  Pulse:  [] 94  Resp:  [31-49] 31  SpO2:  [84 %-100 %] 98 %  BP: ()/(55-88) 87/69     Weight: 77.1 kg (170 lb)(unable to stand due to SOB)  There is no height or weight on file to calculate BMI.      Intake/Output Summary (Last 24 hours) at 10/29/2019 1734  Last data filed at 10/29/2019 1730  Gross per 24 hour   Intake 1172.06 ml   Output 195 ml   Net 977.06 ml       Physical Exam   Constitutional: He has a sickly appearance. He appears ill. No distress. He is sedated and intubated.   Frail appearing with barrel chest   HENT:   Head: Atraumatic.   Eyes: Pupils are equal, round, and reactive to light. Conjunctivae are normal.   Neck: No JVD present. No tracheal deviation present.   Cardiovascular: Normal rate. An irregularly irregular rhythm present.   Pulses:       Radial pulses are 2+ on the right side, and 2+ on the left side.        Dorsalis pedis pulses are 2+ on the right side, and 2+ on the left side.   Pulmonary/Chest: He is intubated. He has decreased breath sounds. He has no wheezes. He has rhonchi in the right lower field. He has no rales.   Abdominal: Soft. Bowel sounds are normal. He exhibits no distension.   Musculoskeletal: He exhibits no edema.    Skin: Skin is warm and dry. Capillary refill takes less than 2 seconds. Bruising noted. No cyanosis. Nails show clubbing.       Vents:  Vent Mode: A/C (10/29/19 1723)  Ventilator Initiated: Yes (10/29/19 1143)  Set Rate: 18 bmp (10/29/19 1723)  Vt Set: 450 mL (10/29/19 1723)  Pressure Support: 0 cmH20 (10/29/19 1723)  PEEP/CPAP: 5 cmH20 (10/29/19 1723)  Oxygen Concentration (%): 45 (10/29/19 1723)  Peak Airway Pressure: 23 cmH2O (10/29/19 1723)  Plateau Pressure: 0 cmH20 (10/29/19 1723)  Total Ve: 19.7 mL (10/29/19 1723)  F/VT Ratio<105 (RSBI): (!) (P) 51.16 (10/29/19 1723)    Lines/Drains/Airways     Drain                 NG/OG Tube 10/29/19 1200 [place] sump 16 Fr. Left nostril less than 1 day         Urethral Catheter 10/29/19 1440 16 Fr. less than 1 day          Airway                 Airway - Non-Surgical 10/29/19 1134 Endotracheal Tube less than 1 day          Peripheral Intravenous Line                 Peripheral IV - Single Lumen 10/29/19 0815 20 G Right Forearm less than 1 day         Peripheral IV - Single Lumen 10/29/19 20 G Left Forearm less than 1 day                Significant Labs:    CBC/Anemia Profile:  Recent Labs   Lab 10/29/19  0817   WBC 9.08   HGB 14.1   HCT 44.5      *   RDW 14.0        Chemistries:  Recent Labs   Lab 10/29/19  0817      K 4.7      CO2 25   BUN 38*   CREATININE 1.3   CALCIUM 9.3   ALBUMIN 4.1   PROT 7.8   BILITOT 1.3*   ALKPHOS 139*   ALT 23   AST 42*       Lactic Acid:   Recent Labs   Lab 10/29/19  0918 10/29/19  1254   LACTATE 2.7* 2.5*     Troponin:   Recent Labs   Lab 10/29/19  0817 10/29/19  1446   TROPONINI 0.029* 0.159*     All pertinent labs within the past 24 hours have been reviewed.  ABG  Recent Labs   Lab 10/29/19  1652   PH 7.288*   PO2 87   PCO2 41.7   HCO3 20.0*   BE -7         Significant Imaging:   I have reviewed all pertinent imaging results/findings within the past 24 hours.

## 2019-10-29 NOTE — ASSESSMENT & PLAN NOTE
S/t pneumonia  1L IVF in ED;  and lasix home med concerning for heart failure but not endorsed by SO  Will add gentle IV hydration  ICU hemodynamic monitoring  Blood cultures obtained in ED  Send sputum and check UA for completeness  Initiate broad spectrum empiric aspiration pneumonia coverage  Monitor temp, wbc, culture info, and repeat lactate in am

## 2019-10-29 NOTE — ASSESSMENT & PLAN NOTE
10/29:  Not on rate control medical medications at home  HR ok for now  On coumadin   Pharm to dose   Metoprolol IV PRN

## 2019-10-29 NOTE — ASSESSMENT & PLAN NOTE
10/29:  Likely 2/2 to aspiration PNA  Sputum culture pending  Blood culture pending  On vanc, cefepime, and flagyl  Continue to wean vent as tolerated   Reviewed blood gas

## 2019-10-29 NOTE — ASSESSMENT & PLAN NOTE
Suspect demand ischemia s/t respiratory distress and atrial fib with RVR   Coumadin anticoagulation  Repeat troponin in am for peak/resolution

## 2019-10-29 NOTE — SUBJECTIVE & OBJECTIVE
Past Medical History:   Diagnosis Date    A-fib     COPD (chronic obstructive pulmonary disease)        No past surgical history on file.    Review of patient's allergies indicates:  No Known Allergies    No current facility-administered medications on file prior to encounter.      Current Outpatient Medications on File Prior to Encounter   Medication Sig    albuterol (PROVENTIL) 2.5 mg /3 mL (0.083 %) nebulizer solution Take 2.5 mg by nebulization every 6 (six) hours as needed for Wheezing. Rescue    brimonidine 0.15 % OPTH DROP (ALPHAGAN) 0.15 % ophthalmic solution 1 drop 3 (three) times daily.    brinzolamide/brimonidine tart (SIMBRINZA OPHT) Apply to eye.    carbidopa-levodopa  mg (SINEMET)  mg per tablet     fluticasone propionate (FLOVENT HFA) 110 mcg/actuation inhaler Inhale 1 puff into the lungs 2 (two) times daily. Controller    furosemide (LASIX) 20 MG tablet     lisinopril (PRINIVIL,ZESTRIL) 20 MG tablet     vit C/vit E ac/lut/copper/zinc (PRESERVISION LUTEIN ORAL) Take by mouth.    vitamin D (VITAMIN D3) 1000 units Tab Take 1,000 Units by mouth once daily.    warfarin (COUMADIN) 5 MG tablet     bevacizumab (AVASTIN) 2.5 mg/0.10 mL injection Inject into the eye.     Family History     None        Tobacco Use    Smoking status: Not on file   Substance and Sexual Activity    Alcohol use: Not on file    Drug use: Not on file    Sexual activity: Not on file     Review of Systems   Unable to perform ROS: Intubated     Objective:     Vital Signs (Most Recent):  Temp: 99.3 °F (37.4 °C) (10/29/19 1614)  Pulse: (!) 118 (10/29/19 1730)  Resp: (!) 31 (10/29/19 1730)  BP: (!) 100/59 (10/29/19 1730)  SpO2: 98 % (10/29/19 1730) Vital Signs (24h Range):  Temp:  [99.1 °F (37.3 °C)-99.6 °F (37.6 °C)] 99.3 °F (37.4 °C)  Pulse:  [] 118  Resp:  [31-49] 31  SpO2:  [84 %-100 %] 98 %  BP: ()/(55-88) 100/59     Weight: 77.1 kg (170 lb)(unable to stand due to SOB)  There is no height or  weight on file to calculate BMI.    Physical Exam   Constitutional: He appears well-developed and well-nourished. No distress.   intubated   HENT:   Head: Normocephalic and atraumatic.   Cardiovascular: Exam reveals no gallop and no friction rub.   No murmur heard.  Irregular irregular   Pulmonary/Chest: Effort normal. No stridor. No respiratory distress. He has no wheezes. He has rales (RLL).   Abdominal: Soft. Bowel sounds are normal. He exhibits no distension and no mass. There is no tenderness. There is no guarding.   Musculoskeletal: He exhibits no edema.   Neurological:   sedated   Skin: Skin is warm. No rash noted. He is not diaphoretic.           Significant Labs:   Recent Lab Results       10/29/19  1652   10/29/19  1643   10/29/19  1446   10/29/19  1254   10/29/19  1054        Procalcitonin               Albumin               Alkaline Phosphatase               Allens Test Pass       N/A     ALT               Anion Gap               AST               Baso #               Basophil%               BILIRUBIN TOTAL               BNP               Site RR       RF     BUN, Bld               Calcium               Chloride               CO2               Creatinine               DelSys Adult Vent       CPAP/BiPAP     Differential Method               eGFR if                eGFR if non                Eos #               Eosinophil%               EP         8     FiO2 45       90     Glucose               Gran # (ANC)               Gran%               Hematocrit               Hemoglobin               Immature Grans (Abs)               Immature Granulocytes               Coumadin Monitoring INR   2.9  Comment:  Coumadin Therapy:  2.0 - 3.0 for INR for all indicators except mechanical heart valves  and antiphospholipid syndromes which should use 2.5 - 3.5.             IP         16     Lactate, Giovanni       2.5  Comment:  Falsely low lactic acid results can be found in samples   containing  >=13.0 mg/dL total bilirubin and/or >=3.5 mg/dL   direct bilirubin.         Lymph #               Lymph%               MCH               MCHC               MCV               Min Vol               Mode AC/PRVC       BiPAP     Mono #               Mono%               MPV               nRBC               PEEP 5             Platelets               POC BE -7       -4     POC HCO3 20.0       23.1     POC PCO2 41.7       53.4     POC PH 7.288       7.243     POC PO2 87       223     POC SATURATED O2 95       100     Potassium               PROTEIN TOTAL               Protime   30.3           Rate 18       35     RBC               RDW               Sample ARTERIAL       ARTERIAL     Sodium               Spont Rate               Troponin I     0.159  Comment:  The reference interval for Troponin I represents the 99th percentile   cutoff   for our facility and is consistent with 3rd generation assay   performance.           Vt 450             WBC                                10/29/19  0918   10/29/19  0839   10/29/19  0817        Procalcitonin 1.49  Comment:  A concentration < 0.25 ng/mL represents a low risk bacterial   infection.  Procalcitonin may not be accurate among patients with localized   infection, recent trauma or major surgery, immunosuppressed state,   invasive fungal infection, renal dysfunction. Decisions regarding   initiation or continuation of antibiotic therapy should not be based   solely on procalcitonin levels.           Albumin     4.1     Alkaline Phosphatase     139     Allens Test   Pass       ALT     23     Anion Gap     8     AST     42     Baso #     0.01     Basophil%     0.1     BILIRUBIN TOTAL     1.3  Comment:  For infants and newborns, interpretation of results should be based  on gestational age, weight and in agreement with clinical  observations.  Premature Infant recommended reference ranges:  Up to 24 hours.............<8.0 mg/dL  Up to 48 hours............<12.0 mg/dL  3-5  days..................<15.0 mg/dL  6-29 days.................<15.0 mg/dL       BNP     353  Comment:  Values of less than 100 pg/ml are consistent with non-CHF populations.     Site   RR       BUN, Bld     38     Calcium     9.3     Chloride     110     CO2     25     Creatinine     1.3     DelSys   CPAP/BiPAP       Differential Method     Automated     eGFR if      57.1     eGFR if non      49.4  Comment:  Calculation used to obtain the estimated glomerular filtration  rate (eGFR) is the CKD-EPI equation.        Eos #     0.2     Eosinophil%     2.0     EP   8       FiO2   80       Glucose     101     Gran # (ANC)     8.2     Gran%     90.6     Hematocrit     44.5     Hemoglobin     14.1     Immature Grans (Abs)     0.01  Comment:  Mild elevation in immature granulocytes is non specific and   can be seen in a variety of conditions including stress response,   acute inflammation, trauma and pregnancy. Correlation with other   laboratory and clinical findings is essential.       Immature Granulocytes     0.1     Coumadin Monitoring INR           IP   16       Lactate, Giovanni 2.7  Comment:  Falsely low lactic acid results can be found in samples   containing >=13.0 mg/dL total bilirubin and/or >=3.5 mg/dL   direct bilirubin.           Lymph #     0.5     Lymph%     5.3     MCH     32.6     MCHC     31.7     MCV     103     Min Vol   31       Mode   BiPAP       Mono #     0.2     Mono%     1.9     MPV     9.4     nRBC     0     PEEP           Platelets     155     POC BE   -5       POC HCO3   23.2       POC PCO2   58.8       POC PH   7.204       POC PO2   89       POC SATURATED O2   94       Potassium     4.7     PROTEIN TOTAL     7.8     Protime           Rate           RBC     4.33     RDW     14.0     Sample   ARTERIAL       Sodium     143     Spont Rate   36       Troponin I     0.029  Comment:  The reference interval for Troponin I represents the 99th percentile   cutoff   for our  facility and is consistent with 3rd generation assay   performance.       Vt           WBC     9.08           Significant Imaging: I have reviewed all pertinent imaging results/findings within the past 24 hours.

## 2019-10-29 NOTE — ASSESSMENT & PLAN NOTE
Rate controlled with optimization of pulmonary distress  ICU cardiac monitoring  INR therapeutic, continue coumadin with pharmacy monitoring and adjustment to maintain anticoagulation

## 2019-10-29 NOTE — HPI
Frail appearing 86 year old male with PMH including COPD, atrial fib on coumadin, parkinson's, and glaucoma presented to ED at [facility] via EMS  Pt traveling via [place] boat cruise with significant other from [place]  SO reports complaints of reflux, fullness, and discomfort after dinner last evening; then this am woke with increased coughing and shortly became less responsive to conversation when she called for assistance from onboard EMT  ED evaluation revealed atrial fib with RVR 150s, RR 40s on trial BiPap @ 80%  Intubated after failing trial of BiPap; CXR shows RLL infiltrate concerning for aspiration in addition to coarse interstitial markings suspicious for underlying ILD/fibrosis/or scarring  Lab significant for creatinine 1.3; troponin initial 0.029 sienna to 0.159; ; lactic acid initial 2.7 decreased to 2.5; procalcitonin 1.49; initial abg respiratory acidosis with hypoxemia and hypercapnia

## 2019-10-29 NOTE — ASSESSMENT & PLAN NOTE
No evidence of acute exacerbation  Continue maintenance bronchodilator  Monitor for s/s of exacerbation s/t pneumonia

## 2019-10-29 NOTE — ASSESSMENT & PLAN NOTE
S/t pneumonia with underlying COPD  Vent settings reviewed and adjusted per abg  VAP prophylaxis  SAT/SBT in am

## 2019-10-29 NOTE — HPI
Patient is an 85 y/o male with a PMH of parkinsons, afib, GERD, COPD presents as transfer from [facility] for respiratory failure. Patient was intubated prior to arrival and history was obtained by partner. She reports her and patient were on a cruise leaving out of [place] and patient developed some breathing difficulty. Cruise healthcare professionals were called [facility] He was intubated there due to respiratory insufficiency with hypercarbia and transferred to [facility]. She denied any sick contacts or recent hospitalizations.

## 2019-10-29 NOTE — ED PROVIDER NOTES
Encounter Date: 10/29/2019       History     Chief Complaint   Patient presents with    Shortness of Breath     Hx COPD. SOB started yesterday     Patient currently presents accompanied by his wife with concerns regarding difficulty breathing.  This began last evening after dinner.  Patient has a history of COPD as well as some atrial fibrillation.  They deny a known history of coronary artery disease or heart failure.  Patient has not had any relief with the nebulizer treatments prior to arrival.  They note that the symptoms have progressed overnight.  They are currently here on a paddleboat cruise from [place].  Patient on CPAP per EMS at arrival.        Review of patient's allergies indicates:  No Known Allergies  Past Medical History:   Diagnosis Date    A-fib     COPD (chronic obstructive pulmonary disease)      No past surgical history on file.  No family history on file.  Social History     Tobacco Use    Smoking status: Not on file   Substance Use Topics    Alcohol use: Not on file    Drug use: Not on file     Review of Systems   Constitutional: Negative for chills and fever.   HENT: Negative for congestion.    Respiratory: Positive for cough and shortness of breath. Negative for chest tightness.    Cardiovascular: Negative for chest pain and leg swelling.   Gastrointestinal: Negative for abdominal pain, constipation, diarrhea, nausea and vomiting.   Genitourinary: Negative for dysuria, frequency and urgency.   Skin: Negative for color change and rash.   Allergic/Immunologic: Negative for immunocompromised state.   Neurological: Negative for dizziness, syncope, weakness, numbness and headaches.   Hematological: Negative for adenopathy. Does not bruise/bleed easily.   All other systems reviewed and are negative.      Physical Exam     Initial Vitals [10/29/19 0807]   BP Pulse Resp Temp SpO2   132/76 (!) 157 (!) 40 99.1 °F (37.3 °C) (!) 89 %      MAP       --         Vitals:    10/29/19 0821 10/29/19  0829 10/29/19 0839 10/29/19 0855   BP:    (!) 113/55   Pulse: (!) 160 (!) 119 110 (!) 117   Resp: (!) 40 (!) 39 (!) 49 (!) 40   Temp:       TempSrc:       SpO2: 97% 97% 95% (!) 94%   Weight:        10/29/19 0917 10/29/19 0946 10/29/19 1040 10/29/19 1054   BP: 122/78 123/75 98/65    Pulse: (!) 118 94 107 110   Resp: (!) 41 (!) 38 (!) 37 (!) 37   Temp:       TempSrc:       SpO2: 99% (!) 93% 99% (!) 84%   Weight:        10/29/19 1100 10/29/19 1123 10/29/19 1132 10/29/19 1143   BP: 121/68 120/80 135/83 138/69   Pulse: 108 102 (!) 111 110   Resp: (!) 38 (!) 37 (!) 33 (!) 41   Temp:       TempSrc:       SpO2: 100% (!) 90% (!) 92% (!) 92%   Weight:        10/29/19 1201 10/29/19 1218 10/29/19 1232   BP: 134/77 90/65 102/69   Pulse: (!) 118 102 98   Resp: (!) 40 (!) 35 (!) 38   Temp:      TempSrc:      SpO2: 95% 97% 97%   Weight:        Physical Exam    Nursing note and vitals reviewed.  Constitutional: He appears well-developed and well-nourished. He is not diaphoretic. No distress.   HENT:   Head: Normocephalic and atraumatic.   Right Ear: External ear normal.   Left Ear: External ear normal.   Nose: Nose normal.   Mouth/Throat: Oropharynx is clear and moist.   Eyes: Conjunctivae and EOM are normal. Pupils are equal, round, and reactive to light. No scleral icterus.   Neck: Neck supple. No JVD present.   Cardiovascular: Normal heart sounds and intact distal pulses. An irregularly irregular rhythm present.  Tachycardia present.  Exam reveals no gallop and no friction rub.    No murmur heard.  Pulmonary/Chest: Accessory muscle usage present. Tachypnea noted. No respiratory distress. He has wheezes (minimal). He has rhonchi. He has no rales.   Abdominal: Soft. Bowel sounds are normal. He exhibits no distension. There is no tenderness.   Musculoskeletal: Normal range of motion. He exhibits no edema.   Neurological: He is alert and oriented to person, place, and time.   Skin: Skin is warm and dry. No rash noted.   Psychiatric:  He has a normal mood and affect. His behavior is normal.       ED Course   Intubation  Date/Time: 10/29/2019 12:49 PM  Performed by: Fernie Gonzalez MD  Authorized by: Fernie Gonzalez MD   Consent Done: Yes  Consent: Verbal consent obtained.  Risks and benefits: risks, benefits and alternatives were discussed  Consent given by: spouse  Patient understanding: patient states understanding of the procedure being performed  Indications: respiratory failure  Intubation method: oral  Patient status: sedated  Preoxygenation: bag valve mask  Sedatives: etomidate  Laryngoscope size: Mac 4  Tube size: 7.5 mm  Tube type: cuffed  Number of attempts: 3 (GEB used on third attempt secondary to difficult canuliating the larynx)  Ventilation between attempts: BVM  Cricoid pressure: no  Cords visualized: yes  Post-procedure assessment: CO2 detector and chest rise  Breath sounds: equal and absent over the epigastrium  Cuff inflated: yes  ETT to teeth: 22 cm  Tube secured with: ETT gracia  Chest x-ray interpreted by me.  Chest x-ray findings: endotracheal tube in appropriate position  Patient tolerance: Patient tolerated the procedure well with no immediate complications  Complications: No    Critical Care  Date/Time: 10/29/2019 12:52 PM  Performed by: Fernie Gonzalez MD  Authorized by: Fernie Gonzalez MD   Direct patient critical care time: 40 minutes  Ordering / reviewing critical care time: 10 minutes  Documentation critical care time: 12 minutes  Consulting other physicians critical care time: 5 minutes  Consult with family critical care time: 8 minutes  Total critical care time (exclusive of procedural time) : 75 minutes  Critical care time was exclusive of separately billable procedures and treating other patients.  Critical care was necessary to treat or prevent imminent or life-threatening deterioration of the following conditions: respiratory failure.  Critical care was time spent personally by me on the following  activities: ordering and review of radiographic studies, ordering and review of laboratory studies, ordering and performing treatments and interventions, evaluation of patient's response to treatment, examination of patient, re-evaluation of patient's condition, pulse oximetry, discussions with consultants, development of treatment plan with patient or surrogate and obtaining history from patient or surrogate.        Labs Reviewed   B-TYPE NATRIURETIC PEPTIDE - Abnormal; Notable for the following components:       Result Value     (*)     All other components within normal limits   CBC W/ AUTO DIFFERENTIAL - Abnormal; Notable for the following components:    RBC 4.33 (*)     Mean Corpuscular Volume 103 (*)     Mean Corpuscular Hemoglobin 32.6 (*)     Mean Corpuscular Hemoglobin Conc 31.7 (*)     Gran # (ANC) 8.2 (*)     Lymph # 0.5 (*)     Mono # 0.2 (*)     Gran% 90.6 (*)     Lymph% 5.3 (*)     Mono% 1.9 (*)     All other components within normal limits   COMPREHENSIVE METABOLIC PANEL - Abnormal; Notable for the following components:    BUN, Bld 38 (*)     Total Bilirubin 1.3 (*)     Alkaline Phosphatase 139 (*)     AST 42 (*)     eGFR if  57.1 (*)     eGFR if non  49.4 (*)     All other components within normal limits   TROPONIN I - Abnormal; Notable for the following components:    Troponin I 0.029 (*)     All other components within normal limits   LACTIC ACID, PLASMA - Abnormal; Notable for the following components:    Lactate (Lactic Acid) 2.7 (*)     All other components within normal limits   PROCALCITONIN - Abnormal; Notable for the following components:    Procalcitonin 1.49 (*)     All other components within normal limits   ISTAT PROCEDURE - Abnormal; Notable for the following components:    POC PH 7.204 (*)     POC PCO2 58.8 (*)     POC HCO3 23.2 (*)     POC SATURATED O2 94 (*)     All other components within normal limits   ISTAT PROCEDURE - Abnormal; Notable for  the following components:    POC PH 7.243 (*)     POC PCO2 53.4 (*)     POC PO2 223 (*)     POC HCO3 23.1 (*)     All other components within normal limits   CULTURE, BLOOD   CULTURE, BLOOD   LACTIC ACID, PLASMA     EKG Readings: (Independently Interpreted)   Initial Reading: No STEMI. Rhythm: Atrial Fibrillation. Heart Rate: 141. Ectopy: No Ectopy. Conduction: Normal. Axis: Left Axis Deviation.     ECG Results          EKG 12-lead (In process)  Result time 10/29/19 10:12:02    In process by Interface, Lab In [facility] (10/29/19 10:12:02)                 Narrative:    Test Reason : R06.02,    Vent. Rate : 141 BPM     Atrial Rate : 277 BPM     P-R Int : 000 ms          QRS Dur : 096 ms      QT Int : 334 ms       P-R-T Axes : 000 -40 020 degrees     QTc Int : 511 ms    Atrial fibrillation with rapid ventricular response  Left axis deviation  Incomplete right bundle branch block  Nonspecific ST and T wave abnormality  Abnormal ECG  No previous ECGs available    Referred By: [email or web address]   SELF           Confirmed By:                             Imaging Results          X-Ray Chest 1 View (In process)                X-Ray Chest AP Portable (Final result)  Result time 10/29/19 09:43:42    Final result by Josesito Ramos MD (10/29/19 09:43:42)                 Impression:      Large consolidative opacity in the right lower lung zone and additional small opacity in the left lower lung zone concerning for aspiration or pneumonia.  Recommend follow-up radiographs.      Electronically signed by: Josesito Ramos  Date:    10/29/2019  Time:    09:43             Narrative:    EXAMINATION:  XR CHEST AP PORTABLE    CLINICAL HISTORY:  SOB;    TECHNIQUE:  Single frontal view of the chest was performed.    COMPARISON:  None    FINDINGS:  Cardiomediastinal silhouette is enlarged by AP technique.  Large area of consolidative opacity within the right lower lung zone and additional smaller opacity within the left lower lung zone.  No  significant effusion.  No pneumothorax.  Visualized osseous structures appear intact.                                 Medical Decision Making:   ED Management:  All historical, clinical, radiographic, and laboratory findings were reviewed with the patient/family in detail along with the indications for transport to the facility in [place] in order to receive cardiac monitoring, mechanical ventilation, pulmonary toilet, and IV antibiotics.  All remaining questions and concerns were addressed at this time and the patient/family communicates understanding and agrees to proceed accordingly.  Similarly, all pertinent details of the encounter were discussed with Dr. Franco who agrees to receive the patient at [facility] for further care as outlined above.  The patient will be transferred by [facility] ambulance services secondary to a need for ongoing cardiac monitoring and mechanical ventilation en route.  Fernie Gonzalez MD  12:54 PM                           Clinical Impression:       ICD-10-CM ICD-9-CM   1. Pneumonia of both lower lobes due to infectious organism J18.1 483.8   2. SOB (shortness of breath) R06.02 786.05   3. Acute respiratory failure with hypoxia and hypercapnia J96.01 518.81    J96.02    4. Sepsis, due to unspecified organism, unspecified whether acute organ dysfunction present A41.9 038.9     995.91   5. Elevated troponin R79.89 790.6   6. Atrial fibrillation with RVR I48.91 427.31   7. Metabolic acidosis with respiratory acidosis E87.4 276.4                                Fernie Gonzalez MD  10/29/19 [id number]

## 2019-10-29 NOTE — ASSESSMENT & PLAN NOTE
Empiric antimicrobial to start after sputum collected for culture  Monitor temp, wbc, culture info, and cxr daily  Consider swallow eval post extubation; reported events consistent with a reflux type aspiration however per SO pt has had ST for swallow precautions s/t parkinson's diagnosis but does not strictly follow those precautions

## 2019-10-29 NOTE — HOSPITAL COURSE
Admitted to ICU with OETT on mechanical ventilation with propofol sedation and marginal BP  10/30 - remains sedated and intubated, requiring low dose neosynephrine to support BP  10/31 - Silas SAT/SBT this AM and extubated.  Still weak.  VSS and no distress.  Still on TF and low dose Marco infusion.  Discussed care with spouse and son at bedside  11/1 - Tolerated extubation yesterday.  Much more awake and alert today.  Still quite weak.  Silas TF via NG.  Off vasopressor since yesterday

## 2019-10-30 LAB
ALLENS TEST: ABNORMAL
ALLENS TEST: ABNORMAL
ANION GAP SERPL CALC-SCNC: 11 MMOL/L (ref 8–16)
ANISOCYTOSIS BLD QL SMEAR: SLIGHT
AORTIC VALVE REGURGITATION: ABNORMAL
AORTIC VALVE STENOSIS: ABNORMAL
BASOPHILS # BLD AUTO: 0.04 K/UL (ref 0–0.2)
BASOPHILS NFR BLD: 0.4 % (ref 0–1.9)
BUN SERPL-MCNC: 50 MG/DL (ref 8–23)
CALCIUM SERPL-MCNC: 8.6 MG/DL (ref 8.7–10.5)
CHLORIDE SERPL-SCNC: 110 MMOL/L (ref 95–110)
CO2 SERPL-SCNC: 20 MMOL/L (ref 23–29)
CREAT SERPL-MCNC: 1.4 MG/DL (ref 0.5–1.4)
DACRYOCYTES BLD QL SMEAR: ABNORMAL
DELSYS: ABNORMAL
DELSYS: ABNORMAL
DIASTOLIC DYSFUNCTION: YES
DIFFERENTIAL METHOD: ABNORMAL
EOSINOPHIL # BLD AUTO: 0 K/UL (ref 0–0.5)
EOSINOPHIL NFR BLD: 0.1 % (ref 0–8)
ERYTHROCYTE [DISTWIDTH] IN BLOOD BY AUTOMATED COUNT: 14.1 % (ref 11.5–14.5)
ERYTHROCYTE [SEDIMENTATION RATE] IN BLOOD BY WESTERGREN METHOD: 18 MM/H
EST. GFR  (AFRICAN AMERICAN): 52 ML/MIN/1.73 M^2
EST. GFR  (NON AFRICAN AMERICAN): 45 ML/MIN/1.73 M^2
ESTIMATED PA SYSTOLIC PRESSURE: 52.42
FIO2: 45
GLUCOSE SERPL-MCNC: 75 MG/DL (ref 70–110)
HCO3 UR-SCNC: 20.5 MMOL/L (ref 24–28)
HCO3 UR-SCNC: 20.7 MMOL/L (ref 24–28)
HCT VFR BLD AUTO: 41.3 % (ref 40–54)
HGB BLD-MCNC: 12.3 G/DL (ref 14–18)
IMM GRANULOCYTES # BLD AUTO: 0.03 K/UL (ref 0–0.04)
IMM GRANULOCYTES NFR BLD AUTO: 0.3 % (ref 0–0.5)
INR PPP: 3.2 (ref 0.8–1.2)
LACTATE SERPL-SCNC: 1.7 MMOL/L (ref 0.5–2.2)
LACTATE SERPL-SCNC: 1.7 MMOL/L (ref 0.5–2.2)
LYMPHOCYTES # BLD AUTO: 0.4 K/UL (ref 1–4.8)
LYMPHOCYTES NFR BLD: 3.5 % (ref 18–48)
MAGNESIUM SERPL-MCNC: 1.7 MG/DL (ref 1.6–2.6)
MCH RBC QN AUTO: 31.5 PG (ref 27–31)
MCHC RBC AUTO-ENTMCNC: 29.8 G/DL (ref 32–36)
MCV RBC AUTO: 106 FL (ref 82–98)
MITRAL VALVE MOBILITY: NORMAL
MITRAL VALVE REGURGITATION: ABNORMAL
MODE: ABNORMAL
MODE: ABNORMAL
MONOCYTES # BLD AUTO: 0.9 K/UL (ref 0.3–1)
MONOCYTES NFR BLD: 8.2 % (ref 4–15)
NEUTROPHILS # BLD AUTO: 10 K/UL (ref 1.8–7.7)
NEUTROPHILS NFR BLD: 87.5 % (ref 38–73)
NRBC BLD-RTO: 0 /100 WBC
PCO2 BLDA: 39.8 MMHG (ref 35–45)
PCO2 BLDA: 44 MMHG (ref 35–45)
PEEP: 5
PH SMN: 7.28 [PH] (ref 7.35–7.45)
PH SMN: 7.32 [PH] (ref 7.35–7.45)
PLATELET # BLD AUTO: 115 K/UL (ref 150–350)
PLATELET BLD QL SMEAR: ABNORMAL
PMV BLD AUTO: 9.6 FL (ref 9.2–12.9)
PO2 BLDA: 102 MMHG (ref 80–100)
PO2 BLDA: 102 MMHG (ref 80–100)
POC BE: -5 MMOL/L
POC BE: -6 MMOL/L
POC SATURATED O2: 97 % (ref 95–100)
POC SATURATED O2: 97 % (ref 95–100)
POIKILOCYTOSIS BLD QL SMEAR: SLIGHT
POTASSIUM SERPL-SCNC: 5 MMOL/L (ref 3.5–5.1)
PROTHROMBIN TIME: 32.9 SEC (ref 9–12.5)
RBC # BLD AUTO: 3.9 M/UL (ref 4.6–6.2)
RETIRED EF AND QEF - SEE NOTES: 35 (ref 55–65)
SAMPLE: ABNORMAL
SAMPLE: ABNORMAL
SCHISTOCYTES BLD QL SMEAR: PRESENT
SITE: ABNORMAL
SITE: ABNORMAL
SODIUM SERPL-SCNC: 141 MMOL/L (ref 136–145)
TRICUSPID VALVE REGURGITATION: ABNORMAL
TROPONIN I SERPL DL<=0.01 NG/ML-MCNC: 0.12 NG/ML (ref 0–0.03)
TROPONIN I SERPL DL<=0.01 NG/ML-MCNC: 0.14 NG/ML (ref 0–0.03)
VT: 450
WBC # BLD AUTO: 11.38 K/UL (ref 3.9–12.7)

## 2019-10-30 PROCEDURE — 99291 PR CRITICAL CARE, E/M 30-74 MINUTES: ICD-10-PCS | Mod: ,,, | Performed by: NURSE PRACTITIONER

## 2019-10-30 PROCEDURE — C1751 CATH, INF, PER/CENT/MIDLINE: HCPCS

## 2019-10-30 PROCEDURE — 94640 AIRWAY INHALATION TREATMENT: CPT

## 2019-10-30 PROCEDURE — 83605 ASSAY OF LACTIC ACID: CPT | Mod: 91

## 2019-10-30 PROCEDURE — 99291 CRITICAL CARE FIRST HOUR: CPT | Mod: ,,, | Performed by: NURSE PRACTITIONER

## 2019-10-30 PROCEDURE — 84484 ASSAY OF TROPONIN QUANT: CPT | Mod: 91

## 2019-10-30 PROCEDURE — 93306 2D ECHO ONLY: ICD-10-PCS | Mod: 26,,, | Performed by: INTERNAL MEDICINE

## 2019-10-30 PROCEDURE — 25000003 PHARM REV CODE 250: Performed by: INTERNAL MEDICINE

## 2019-10-30 PROCEDURE — 85025 COMPLETE CBC W/AUTO DIFF WBC: CPT

## 2019-10-30 PROCEDURE — 63600175 PHARM REV CODE 636 W HCPCS: Performed by: FAMILY MEDICINE

## 2019-10-30 PROCEDURE — A4216 STERILE WATER/SALINE, 10 ML: HCPCS | Performed by: INTERNAL MEDICINE

## 2019-10-30 PROCEDURE — 20000000 HC ICU ROOM

## 2019-10-30 PROCEDURE — 63600175 PHARM REV CODE 636 W HCPCS: Performed by: INTERNAL MEDICINE

## 2019-10-30 PROCEDURE — 36600 WITHDRAWAL OF ARTERIAL BLOOD: CPT

## 2019-10-30 PROCEDURE — 36573 INSJ PICC RS&I 5 YR+: CPT

## 2019-10-30 PROCEDURE — 25000003 PHARM REV CODE 250: Performed by: FAMILY MEDICINE

## 2019-10-30 PROCEDURE — 85610 PROTHROMBIN TIME: CPT

## 2019-10-30 PROCEDURE — 63600175 PHARM REV CODE 636 W HCPCS: Performed by: NURSE PRACTITIONER

## 2019-10-30 PROCEDURE — 99900035 HC TECH TIME PER 15 MIN (STAT)

## 2019-10-30 PROCEDURE — 25000003 PHARM REV CODE 250: Performed by: HOSPITALIST

## 2019-10-30 PROCEDURE — 97802 MEDICAL NUTRITION INDIV IN: CPT

## 2019-10-30 PROCEDURE — 83735 ASSAY OF MAGNESIUM: CPT

## 2019-10-30 PROCEDURE — S0028 INJECTION, FAMOTIDINE, 20 MG: HCPCS | Performed by: FAMILY MEDICINE

## 2019-10-30 PROCEDURE — 93010 ELECTROCARDIOGRAM REPORT: CPT | Mod: ,,, | Performed by: INTERNAL MEDICINE

## 2019-10-30 PROCEDURE — 36415 COLL VENOUS BLD VENIPUNCTURE: CPT

## 2019-10-30 PROCEDURE — 63600175 PHARM REV CODE 636 W HCPCS: Performed by: HOSPITALIST

## 2019-10-30 PROCEDURE — 80048 BASIC METABOLIC PNL TOTAL CA: CPT

## 2019-10-30 PROCEDURE — 82803 BLOOD GASES ANY COMBINATION: CPT

## 2019-10-30 PROCEDURE — S0030 INJECTION, METRONIDAZOLE: HCPCS | Performed by: FAMILY MEDICINE

## 2019-10-30 PROCEDURE — 93005 ELECTROCARDIOGRAM TRACING: CPT

## 2019-10-30 PROCEDURE — 94003 VENT MGMT INPAT SUBQ DAY: CPT

## 2019-10-30 PROCEDURE — 93010 EKG 12-LEAD: ICD-10-PCS | Mod: ,,, | Performed by: INTERNAL MEDICINE

## 2019-10-30 PROCEDURE — 93306 TTE W/DOPPLER COMPLETE: CPT | Mod: 26,,, | Performed by: INTERNAL MEDICINE

## 2019-10-30 PROCEDURE — 25000242 PHARM REV CODE 250 ALT 637 W/ HCPCS: Performed by: FAMILY MEDICINE

## 2019-10-30 PROCEDURE — 93306 TTE W/DOPPLER COMPLETE: CPT

## 2019-10-30 RX ORDER — PHENYLEPHRINE HCL IN 0.9% NACL 20MG/250ML
0.5 PLASTIC BAG, INJECTION (ML) INTRAVENOUS CONTINUOUS
Status: DISCONTINUED | OUTPATIENT
Start: 2019-10-30 | End: 2019-10-31

## 2019-10-30 RX ORDER — SODIUM CHLORIDE 0.9 % (FLUSH) 0.9 %
10 SYRINGE (ML) INJECTION
Status: DISCONTINUED | OUTPATIENT
Start: 2019-10-30 | End: 2019-11-07 | Stop reason: HOSPADM

## 2019-10-30 RX ORDER — CEFEPIME HYDROCHLORIDE 2 G/50ML
2 INJECTION, SOLUTION INTRAVENOUS
Status: DISCONTINUED | OUTPATIENT
Start: 2019-10-30 | End: 2019-11-05

## 2019-10-30 RX ORDER — SODIUM CHLORIDE 0.9 % (FLUSH) 0.9 %
10 SYRINGE (ML) INJECTION EVERY 6 HOURS
Status: DISCONTINUED | OUTPATIENT
Start: 2019-10-30 | End: 2019-11-07 | Stop reason: HOSPADM

## 2019-10-30 RX ORDER — WARFARIN 2.5 MG/1
2.5 TABLET ORAL ONCE
Status: COMPLETED | OUTPATIENT
Start: 2019-10-30 | End: 2019-10-30

## 2019-10-30 RX ORDER — PHENYLEPHRINE HCL IN 0.9% NACL 20MG/250ML
0.5 PLASTIC BAG, INJECTION (ML) INTRAVENOUS CONTINUOUS
Status: DISCONTINUED | OUTPATIENT
Start: 2019-10-30 | End: 2019-10-30

## 2019-10-30 RX ORDER — WARFARIN SODIUM 5 MG/1
5 TABLET ORAL DAILY
Status: DISCONTINUED | OUTPATIENT
Start: 2019-10-31 | End: 2019-10-31

## 2019-10-30 RX ORDER — MIDAZOLAM HYDROCHLORIDE 1 MG/ML
0.5 INJECTION INTRAMUSCULAR; INTRAVENOUS
Status: DISCONTINUED | OUTPATIENT
Start: 2019-10-30 | End: 2019-10-31

## 2019-10-30 RX ORDER — FAMOTIDINE 10 MG/ML
20 INJECTION INTRAVENOUS DAILY
Status: DISCONTINUED | OUTPATIENT
Start: 2019-10-30 | End: 2019-11-01

## 2019-10-30 RX ADMIN — VANCOMYCIN HYDROCHLORIDE 1250 MG: 100 INJECTION, POWDER, LYOPHILIZED, FOR SOLUTION INTRAVENOUS at 06:10

## 2019-10-30 RX ADMIN — CHLORHEXIDINE GLUCONATE 0.12% ORAL RINSE 15 ML: 1.2 LIQUID ORAL at 08:10

## 2019-10-30 RX ADMIN — WARFARIN SODIUM 2.5 MG: 2.5 TABLET ORAL at 04:10

## 2019-10-30 RX ADMIN — CEFEPIME HYDROCHLORIDE 2 G: 2 INJECTION, SOLUTION INTRAVENOUS at 03:10

## 2019-10-30 RX ADMIN — METRONIDAZOLE 500 MG: 500 INJECTION, SOLUTION INTRAVENOUS at 10:10

## 2019-10-30 RX ADMIN — METRONIDAZOLE 500 MG: 500 INJECTION, SOLUTION INTRAVENOUS at 05:10

## 2019-10-30 RX ADMIN — SODIUM CHLORIDE 250 ML: 0.45 INJECTION, SOLUTION INTRAVENOUS at 12:10

## 2019-10-30 RX ADMIN — IPRATROPIUM BROMIDE AND ALBUTEROL SULFATE 3 ML: .5; 3 SOLUTION RESPIRATORY (INHALATION) at 12:10

## 2019-10-30 RX ADMIN — MELATONIN 1000 UNITS: at 09:10

## 2019-10-30 RX ADMIN — IPRATROPIUM BROMIDE AND ALBUTEROL SULFATE 3 ML: .5; 3 SOLUTION RESPIRATORY (INHALATION) at 03:10

## 2019-10-30 RX ADMIN — IPRATROPIUM BROMIDE AND ALBUTEROL SULFATE 3 ML: .5; 3 SOLUTION RESPIRATORY (INHALATION) at 07:10

## 2019-10-30 RX ADMIN — Medication 0.3 MCG/KG/MIN: at 07:10

## 2019-10-30 RX ADMIN — CARBIDOPA AND LEVODOPA 1 TABLET: 25; 100 TABLET ORAL at 09:10

## 2019-10-30 RX ADMIN — Medication 10 ML: at 06:10

## 2019-10-30 RX ADMIN — CHLORHEXIDINE GLUCONATE 0.12% ORAL RINSE 15 ML: 1.2 LIQUID ORAL at 09:10

## 2019-10-30 RX ADMIN — IPRATROPIUM BROMIDE AND ALBUTEROL SULFATE 3 ML: .5; 3 SOLUTION RESPIRATORY (INHALATION) at 08:10

## 2019-10-30 RX ADMIN — SODIUM CHLORIDE, SODIUM LACTATE, POTASSIUM CHLORIDE, AND CALCIUM CHLORIDE: .6; .31; .03; .02 INJECTION, SOLUTION INTRAVENOUS at 11:10

## 2019-10-30 RX ADMIN — Medication 0.5 MCG/KG/MIN: at 11:10

## 2019-10-30 RX ADMIN — CARBIDOPA AND LEVODOPA 1 TABLET: 25; 100 TABLET ORAL at 04:10

## 2019-10-30 RX ADMIN — PROPOFOL 10 MCG/KG/MIN: 10 INJECTION, EMULSION INTRAVENOUS at 06:10

## 2019-10-30 RX ADMIN — FAMOTIDINE 20 MG: 10 INJECTION INTRAVENOUS at 09:10

## 2019-10-30 RX ADMIN — Medication 10 ML: at 07:10

## 2019-10-30 RX ADMIN — CEFEPIME HYDROCHLORIDE 2 G: 2 INJECTION, SOLUTION INTRAVENOUS at 04:10

## 2019-10-30 RX ADMIN — METRONIDAZOLE 500 MG: 500 INJECTION, SOLUTION INTRAVENOUS at 03:10

## 2019-10-30 RX ADMIN — CARBIDOPA AND LEVODOPA 1 TABLET: 25; 100 TABLET ORAL at 08:10

## 2019-10-30 RX ADMIN — Medication 0.5 MCG/KG/MIN: at 02:10

## 2019-10-30 NOTE — ASSESSMENT & PLAN NOTE
Suspect demand ischemia s/t respiratory distress and atrial fib with RVR   Coumadin anticoagulation  Repeat troponin in am for peak/resolution  10/30 - trending down, 0.118

## 2019-10-30 NOTE — ASSESSMENT & PLAN NOTE
10/29:  Not on rate control medical medications at home  HR ok for now  On coumadin   Pharm to dose   Metoprolol IV PRN     10/30:  Rate controlled

## 2019-10-30 NOTE — PROGRESS NOTES
Pharmacist Renal Dose Adjustment Note    Shaq Huffman is a 86 y.o. male being treated with the medication Cefepime    Patient Data:    Vital Signs (Most Recent):  Temp: 98.7 °F (37.1 °C) (10/30/19 0715)  Pulse: 98 (10/30/19 0800)  Resp: 15 (10/30/19 0800)  BP: 97/60 (10/30/19 0800)  SpO2: 97 % (10/30/19 0800) Vital Signs (72h Range):  Temp:  [98.3 °F (36.8 °C)-99.6 °F (37.6 °C)]   Pulse:  []   Resp:  [15-49]   BP: ()/(47-95)   SpO2:  [84 %-100 %]      Recent Labs   Lab 10/29/19  0817 10/30/19  0344   CREATININE 1.3 1.4     Serum creatinine: 1.4 mg/dL 10/30/19 0344  Estimated creatinine clearance: 41.7 mL/min    Medication Cefepime 2 g IV every 8 hours will be changed to Cefepime 2 g IV every 12 hours per pharmacy renal dose adjustment protocol for patients with CrCl 30-60 mL/min.    Pharmacist's Name: Vivian Richards PharmD  Pharmacist's Extension: [phone]    Thank you for allowing us to participate in this patient's care.     Vivian Richards PharmD 10/30/2019 8:55 AM

## 2019-10-30 NOTE — SUBJECTIVE & OBJECTIVE
Interval History: Patient hypotensive overnight and was started on phenylephrine. BP improved.    Review of Systems   Unable to perform ROS: Intubated     Objective:     Vital Signs (Most Recent):  Temp: 98.7 °F (37.1 °C) (10/30/19 0715)  Pulse: 101 (10/30/19 1542)  Resp: 18 (10/30/19 1542)  BP: 105/69 (10/30/19 1345)  SpO2: 96 % (10/30/19 1544) Vital Signs (24h Range):  Temp:  [98.3 °F (36.8 °C)-99.6 °F (37.6 °C)] 98.7 °F (37.1 °C)  Pulse:  [] 101  Resp:  [14-34] 18  SpO2:  [95 %-100 %] 96 %  BP: ()/() 105/69     Weight: 77.9 kg (171 lb 11.8 oz)  Body mass index is 21.47 kg/m².    Intake/Output Summary (Last 24 hours) at 10/30/2019 1616  Last data filed at 10/30/2019 1300  Gross per 24 hour   Intake 2244.71 ml   Output 1070 ml   Net 1174.71 ml      Physical Exam   Constitutional: He appears well-developed and well-nourished. No distress.   intubated   HENT:   Head: Normocephalic and atraumatic.   Cardiovascular: Exam reveals no gallop and no friction rub.   No murmur heard.  Irregular irregular   Pulmonary/Chest: Effort normal. No stridor. No respiratory distress. He has no wheezes. He has rales (RLL).   Abdominal: Soft. Bowel sounds are normal. He exhibits no distension and no mass. There is no tenderness. There is no guarding.   Musculoskeletal: He exhibits no edema.   Neurological:   sedated   Skin: Skin is warm. No rash noted. He is not diaphoretic.       Significant Labs:   Recent Lab Results       10/30/19  1003   10/30/19  0449   10/30/19  0344   10/30/19  0024   10/29/19  1722        Allens Test Pass Pass           Anion Gap     11         Aniso     Slight         Appearance, UA         Clear     Bacteria, UA         Occasional     Baso #     0.04         Basophil%     0.4         Bilirubin (UA)         Negative     Site LR RR           BUN, Bld     50         Calcium     8.6         Chloride     110         CO2     20         Color, UA         Yellow     Creatinine     1.4         DelSys  Adult Vent Adult Vent           Differential Method     Automated         eGFR if      52         eGFR if non      45  Comment:  Calculation used to obtain the estimated glomerular filtration  rate (eGFR) is the CKD-EPI equation.            Eos #     0.0         Eosinophil%     0.1         FiO2   45           Glucose     75         Glucose, UA         Negative     Gram Stain (Respiratory)               Gran # (ANC)     10.0         Gran%     87.5         Hematocrit     41.3         Hemoglobin     12.3         Hyaline Casts, UA         1     Immature Grans (Abs)     0.03  Comment:  Mild elevation in immature granulocytes is non specific and   can be seen in a variety of conditions including stress response,   acute inflammation, trauma and pregnancy. Correlation with other   laboratory and clinical findings is essential.           Immature Granulocytes     0.3         Coumadin Monitoring INR     3.2  Comment:  Coumadin Therapy:  2.0 - 3.0 for INR for all indicators except mechanical heart valves  and antiphospholipid syndromes which should use 2.5 - 3.5.           Ketones, UA         Negative     Lactate, Giovanni     1.7  Comment:  Falsely low lactic acid results can be found in samples   containing >=13.0 mg/dL total bilirubin and/or >=3.5 mg/dL   direct bilirubin.   1.7  Comment:  Falsely low lactic acid results can be found in samples   containing >=13.0 mg/dL total bilirubin and/or >=3.5 mg/dL   direct bilirubin.         Leukocytes, UA         Negative     Lymph #     0.4         Lymph%     3.5         Magnesium     1.7         MCH     31.5         MCHC     29.8         MCV     106         Microscopic Comment         SEE COMMENT  Comment:  Other formed elements not mentioned in the report are not   present in the microscopic examination.        Mode AC/PRVC AC/PRVC           Mono #     0.9         Mono%     8.2         MPV     9.6         NITRITE UA         Negative     nRBC     0          Occult Blood UA         3+     PEEP   5           pH, UA         6.0     Platelet Estimate     Appears normal         Platelets     115         POC BE -5 -6           POC HCO3 20.7 20.5           POC PCO2 39.8 44.0           POC PH 7.324 7.276           POC PO2 102 102           POC SATURATED O2 97 97           Poik     Slight         Potassium     5.0         Protein, UA         2+  Comment:  Recommend a 24 hour urine protein or a urine   protein/creatinine ratio if globulin induced proteinuria is  clinically suspected.       Protime     32.9         Rate   18           RBC     3.90         RBC, UA         >100     RDW     14.1         Sample ARTERIAL ARTERIAL           Schistocytes     Present         Sodium     141         Specific Gravity, UA         >=1.030     Specimen UA         Urine, Catheterized     Squam Epithel, UA         2     Tear Drop Cells     Occasional         Troponin I     0.118  Comment:  The reference interval for Troponin I represents the 99th percentile   cutoff   for our facility and is consistent with 3rd generation assay   performance.   0.141  Comment:  The reference interval for Troponin I represents the 99th percentile   cutoff   for our facility and is consistent with 3rd generation assay   performance.         UROBILINOGEN UA         1.0     Vt   450           WBC, UA         3     WBC     11.38                          10/29/19  1701   10/29/19  1652   10/29/19  1643        Allens Test   Pass       Anion Gap           Aniso           Appearance, UA           Bacteria, UA           Baso #           Basophil%           Bilirubin (UA)           Site   RR       BUN, Bld           Calcium           Chloride           CO2           Color, UA           Creatinine           DelSys   Adult Vent       Differential Method           eGFR if            eGFR if non            Eos #           Eosinophil%           FiO2   45       Glucose           Glucose, UA            Gram Stain (Respiratory) Few WBC's          No organisms seen         Gran # (ANC)           Gran%           Hematocrit           Hemoglobin           Hyaline Casts, UA           Immature Grans (Abs)           Immature Granulocytes           Coumadin Monitoring INR     2.9  Comment:  Coumadin Therapy:  2.0 - 3.0 for INR for all indicators except mechanical heart valves  and antiphospholipid syndromes which should use 2.5 - 3.5.       Ketones, UA           Lactate, Giovanni           Leukocytes, UA           Lymph #           Lymph%           Magnesium           MCH           MCHC           MCV           Microscopic Comment           Mode   AC/PRVC       Mono #           Mono%           MPV           NITRITE UA           nRBC           Occult Blood UA           PEEP   5       pH, UA           Platelet Estimate           Platelets           POC BE   -7       POC HCO3   20.0       POC PCO2   41.7       POC PH   7.288       POC PO2   87       POC SATURATED O2   95       Poik           Potassium           Protein, UA           Protime     30.3     Rate   18       RBC           RBC, UA           RDW           Sample   ARTERIAL       Schistocytes           Sodium           Specific Gravity, UA           Specimen UA           Squam Epithel, UA           Tear Drop Cells           Troponin I           UROBILINOGEN UA           Vt   450       WBC, UA           WBC                 Significant Imaging: I have reviewed all pertinent imaging results/findings within the past 24 hours.

## 2019-10-30 NOTE — PLAN OF CARE
Recommendations     Recommendation: 1. Rec TF of Peptamen Intense VHP @ 60 ml/hr. With current propofol infusion, provides 1836 kcal, 133 g pro & 1210 ml free water. Flushes per MD/NP. 2. Hold x residuals > 500 ml. 3. RD to f/u.   Intervention: Collaboration with other providers   Goals: Meet > 85 % EEN/EPN by RD f/u   Nutrition Goal Status: new  Communication of RD Recs: (POC, sticky note, reviewed with NP)   Normal rate, regular rhythm.  Heart sounds S1, S2.  No murmurs, rubs or gallops.

## 2019-10-30 NOTE — PROGRESS NOTES
Called for hypotension   - change Propofol to Versed if not tolerating Propofol   - give 250 ml 1/2NS   - f no improvement on bolus, start on Phenylephrine as patient has Afib   - trend lactate, trops/EKG

## 2019-10-30 NOTE — ASSESSMENT & PLAN NOTE
Empiric cefepime, flagyl, vanco - day 2; consider de-escalation tomorrow pending temp, wbc, culture info  Daily CXR   --Consider swallow eval post extubation; reported events consistent with a reflux type aspiration however per SO pt has had ST for swallow precautions s/t parkinson's diagnosis but does not strictly follow those precautions

## 2019-10-30 NOTE — ASSESSMENT & PLAN NOTE
Rate controlled with optimization of pulmonary distress  ICU cardiac monitoring  Pharmacy to adjust coumadin anticoagulation

## 2019-10-30 NOTE — PROCEDURES
"Shaq Huffman is a 86 y.o. male patient.    Temp: 98.3 °F (36.8 °C) (10/30/19 0305)  Pulse: 96 (10/30/19 0438)  Resp: (!) 22 (10/30/19 0438)  BP: 104/70 (10/30/19 0315)  SpO2: 98 % (10/30/19 0438)  Weight: 77.9 kg (171 lb 11.8 oz) (10/29/19 2001)  Height: 6' 3" (190.5 cm) (10/29/19 2001)    PICC  Date/Time: 10/30/2019 4:45 AM  Consent Done: Yes  Time out: Immediately prior to procedure a time out was called to verify the correct patient, procedure, equipment, support staff and site/side marked as required  Indications: med administration and vascular access  Anesthesia: local infiltration  Local anesthetic: lidocaine 1% without epinephrine  Anesthetic Total (mL): 2  Preparation: skin prepped with ChloraPrep  Skin prep agent dried: skin prep agent completely dried prior to procedure  Sterile barriers: all five maximum sterile barriers used - cap, mask, sterile gown, sterile gloves, and large sterile sheet  Hand hygiene: hand hygiene performed prior to central venous catheter insertion  Location details: left brachial  Catheter type: double lumen  Catheter size: 5 Fr  Catheter Length: 42cm    Ultrasound guidance: yes  Needle advanced into vessel with real time Ultrasound guidance.  Guidewire confirmed in vessel.  Sterile sheath used.  Number of attempts: 1  Post-procedure: blood return through all ports, chlorhexidine patch and sterile dressing applied    Assessment: placement verified by x-ray  Complications: none          Blayne Garcia  10/30/2019  "

## 2019-10-30 NOTE — PROGRESS NOTES
[facility] -   Critical Care Medicine  Progress Note    Patient Name: Shaq Huffman  MRN: [id number]  Admission Date: 10/29/2019  Hospital Length of Stay: 1 days  Code Status: No Order  Attending Provider: Allan Guzman MD  Primary Care Provider: Provider Notinsystem   Principal Problem: Acute respiratory failure with hypoxia and hypercapnia    Subjective:     HPI:  Frail appearing 86 year old male with PMH including COPD, atrial fib on coumadin, parkinson's, and glaucoma presented to ED at [facility] via EMS  Pt traveling via [place] with significant other from [place]  SO reports complaints of reflux, fullness, and discomfort after dinner last evening; then this am woke with increased coughing and shortly became less responsive to conversation when she called for assistance from onboard EMT  ED evaluation revealed atrial fib with RVR 150s, RR 40s on trial BiPap @ 80%  Intubated after failing trial of BiPap; CXR shows RLL infiltrate concerning for aspiration in addition to coarse interstitial markings suspicious for underlying ILD/fibrosis/or scarring  Lab significant for creatinine 1.3; troponin initial 0.029 sienna to 0.159; ; lactic acid initial 2.7 decreased to 2.5; procalcitonin 1.49; initial abg respiratory acidosis with hypoxemia and hypercapnia    Hospital/ICU Course:  Admitted to ICU with OETT on mechanical ventilation with propofol sedation and marginal BP  10/30 - remains sedated and intubated, requiring low dose neosynephrine to support BP    Review of Systems   Unable to perform ROS: Intubated       Objective:     Vital Signs (Most Recent):  Temp: 98.7 °F (37.1 °C) (10/30/19 0715)  Pulse: 97 (10/30/19 1223)  Resp: 18 (10/30/19 1223)  BP: 122/70 (10/30/19 1030)  SpO2: 97 % (10/30/19 1223) Vital Signs (24h Range):  Temp:  [98.3 °F (36.8 °C)-99.6 °F (37.6 °C)] 98.7 °F (37.1 °C)  Pulse:  [] 97  Resp:  [14-40] 18  SpO2:  [95 %-100 %] 97 %  BP: ()/() 122/70      Weight: 77.9 kg (171 lb 11.8 oz)  Body mass index is 21.47 kg/m².      Intake/Output Summary (Last 24 hours) at 10/30/2019 1323  Last data filed at 10/30/2019 0635  Gross per 24 hour   Intake 2144.71 ml   Output 750 ml   Net 1394.71 ml       Physical Exam   Constitutional: He has a sickly appearance. He appears ill. No distress. He is sedated and intubated.   Frail appearing with barrel chest   HENT:   Head: Atraumatic.   Eyes: Pupils are equal, round, and reactive to light. Conjunctivae are normal.   Neck: No JVD present. No tracheal deviation present.   Cardiovascular: Normal rate. An irregularly irregular rhythm present.   Pulses:       Radial pulses are 2+ on the right side, and 2+ on the left side.        Dorsalis pedis pulses are 2+ on the right side, and 2+ on the left side.   Pulmonary/Chest: He is intubated. He has decreased breath sounds. He has no wheezes. He has rhonchi in the right lower field. He has no rales.   Abdominal: Soft. Bowel sounds are normal. He exhibits no distension.   Musculoskeletal: He exhibits no edema.   Skin: Skin is warm and dry. Capillary refill takes less than 2 seconds. Bruising noted. No cyanosis. Nails show clubbing.       Vents:  Vent Mode: A/C (10/30/19 1222)  Ventilator Initiated: Yes (10/29/19 1143)  Set Rate: 18 bmp (10/30/19 1222)  Vt Set: 500 mL (10/30/19 1222)  Pressure Support: 0 cmH20 (10/30/19 1222)  PEEP/CPAP: 5 cmH20 (10/30/19 1222)  Oxygen Concentration (%): 45 (10/30/19 1222)  Peak Airway Pressure: 24 cmH2O (10/30/19 1222)  Plateau Pressure: 26 cmH20 (10/30/19 1222)  Total Ve: 10.5 mL (10/30/19 1222)  F/VT Ratio<105 (RSBI): (!) 30.05 (10/30/19 1222)    Lines/Drains/Airways     Peripherally Inserted Central Catheter Line                 PICC Double Lumen 10/30/19 0445 left brachial less than 1 day          Drain                 NG/OG Tube 10/29/19 1200 [place] sump 16 Fr. Left nostril 1 day         Urethral Catheter 10/29/19 1440 16 Fr. less than 1 day           Airway                 Airway - Non-Surgical 10/29/19 1134 Endotracheal Tube 1 day          Peripheral Intravenous Line                 Peripheral IV - Single Lumen 10/29/19 0815 20 G Right Forearm 1 day         Peripheral IV - Single Lumen 10/29/19 20 G Left Forearm 1 day         Peripheral IV - Single Lumen 10/29/19 1950 20 G Left Forearm less than 1 day                Significant Labs:    CBC/Anemia Profile:  Recent Labs   Lab 10/29/19  0817 10/30/19  0344   WBC 9.08 11.38   HGB 14.1 12.3*   HCT 44.5 41.3    115*   * 106*   RDW 14.0 14.1        Chemistries:  Recent Labs   Lab 10/29/19  0817 10/30/19  0344    141   K 4.7 5.0    110   CO2 25 20*   BUN 38* 50*   CREATININE 1.3 1.4   CALCIUM 9.3 8.6*   ALBUMIN 4.1  --    PROT 7.8  --    BILITOT 1.3*  --    ALKPHOS 139*  --    ALT 23  --    AST 42*  --    MG  --  1.7       Lactic Acid:   Recent Labs   Lab 10/29/19  1254 10/30/19  0024 10/30/19  0344   LACTATE 2.5* 1.7 1.7     Troponin:   Recent Labs   Lab 10/29/19  1446 10/30/19  0024 10/30/19  0344   TROPONINI 0.159* 0.141* 0.118*     All pertinent labs within the past 24 hours have been reviewed.  ABG  Recent Labs   Lab 10/30/19  1003   PH 7.324*   PO2 102*   PCO2 39.8   HCO3 20.7*   BE -5         Significant Imaging:  I have reviewed all pertinent imaging results/findings within the past 24 hours.      ABG  Recent Labs   Lab 10/30/19  1003   PH 7.324*   PO2 102*   PCO2 39.8   HCO3 20.7*   BE -5     Assessment/Plan:     Neuro  Parkinson disease  Continue sinemet via NG    Ophtho  Continue home eye drop course    Pulmonary  * Acute respiratory failure with hypoxia and hypercapnia on mechanical ventilation  S/t pneumonia with underlying COPD  Vent settings reviewed and adjusted per abg  VAP prophylaxis  SAT/SBT daily for extubation readiness    Panlobular emphysema  No evidence of acute exacerbation  Continue maintenance bronchodilator  Monitor for s/s of exacerbation s/t  pneumonia    Aspiration pneumonia of right lower lobe due to gastric secretions  Empiric cefepime, flagyl, vanco - day 2; consider de-escalation tomorrow pending temp, wbc, culture info  Daily CXR   --Consider swallow eval post extubation; reported events consistent with a reflux type aspiration however per SO pt has had ST for swallow precautions s/t parkinson's diagnosis but does not strictly follow those precautions    Cardiac/Vascular  Elevated troponin  Suspect demand ischemia s/t respiratory distress and atrial fib with RVR   Coumadin anticoagulation  Repeat troponin in am for peak/resolution  10/30 - trending down, 0.118    Atrial fibrillation with RVR  Rate controlled with optimization of pulmonary distress  ICU cardiac monitoring  Pharmacy to adjust coumadin anticoagulation    ID  Sepsis  S/t pneumonia  1L IVF in ED;  and lasix home med concerning for heart failure but not endorsed by SO  Continue gentle IV hydration; pressor to keep MAP > 65  ICU hemodynamic monitoring  Blood cultures obtained in ED  Send sputum and check UA for completeness  Initiate broad spectrum empiric aspiration pneumonia coverage  Monitor temp, wbc, culture info, and repeat lactate in am       Critical Care Daily Checklist:    A: Awake: RASS Goal/Actual Goal: RASS Goal: -2-->light sedation  Actual: Mendoza Agitation Sedation Scale (RASS): Restless   B: Spontaneous Breathing Trial Performed?     C: SAT & SBT Coordinated?  yes                      D: Delirium: CAM-ICU Overall CAM-ICU: Negative   E: Early Mobility Performed? Yes   F: Feeding Goal: Goals: Meet > 85 % EEN/EPN by RD f/u   Status: Nutrition Goal Status: new   Current Diet Order   Procedures    Diet NPO      AS: Analgesia/Sedation Fentanyl, propofol   T: Thromboembolic Prophylaxis coumadin   H: HOB > 300 Yes   U: Stress Ulcer Prophylaxis (if needed) pepcid   G: Glucose Control monitoring   B: Bowel Function     I: Indwelling Catheter (Lines & [place]) Necessity  reviewed   D: De-escalation of Antimicrobials/Pharmacotherapies reviewed    Plan for the day/ETD Supportive care, vent, abx    Code Status:  Family/Goals of Care: No Order  Goal home to [place] with SO on discharge   I have discussed case and plan of care in detail with Dr Fernandez and Dr Guzman; Status and plan of care were discussed with team on multidisciplinary rounds.    Critical Care Time: 60 minutes  Critical secondary to respiratory failure, sepsis, pneumonia on mechanical ventilation; Critical care was time spent personally by me on the following activities: development of treatment plan with patient or surrogate and bedside caregivers, discussions with consultants, evaluation of patient's response to treatment, examination of patient, ordering and performing treatments and interventions, ordering and review of laboratory studies, ordering and review of radiographic studies, pulse oximetry, re-evaluation of patient's condition. This critical care time did not overlap with that of any other provider or involve time for any procedures.     JAQUELINE Russell-BC  Critical Care Medicine  [facility]

## 2019-10-30 NOTE — SUBJECTIVE & OBJECTIVE
Review of Systems   Unable to perform ROS: Intubated       Objective:     Vital Signs (Most Recent):  Temp: 98.7 °F (37.1 °C) (10/30/19 0715)  Pulse: 97 (10/30/19 1223)  Resp: 18 (10/30/19 1223)  BP: 122/70 (10/30/19 1030)  SpO2: 97 % (10/30/19 1223) Vital Signs (24h Range):  Temp:  [98.3 °F (36.8 °C)-99.6 °F (37.6 °C)] 98.7 °F (37.1 °C)  Pulse:  [] 97  Resp:  [14-40] 18  SpO2:  [95 %-100 %] 97 %  BP: ()/() 122/70     Weight: 77.9 kg (171 lb 11.8 oz)  Body mass index is 21.47 kg/m².      Intake/Output Summary (Last 24 hours) at 10/30/2019 1323  Last data filed at 10/30/2019 0635  Gross per 24 hour   Intake 2144.71 ml   Output 750 ml   Net 1394.71 ml       Physical Exam   Constitutional: He has a sickly appearance. He appears ill. No distress. He is sedated and intubated.   Frail appearing with barrel chest   HENT:   Head: Atraumatic.   Eyes: Pupils are equal, round, and reactive to light. Conjunctivae are normal.   Neck: No JVD present. No tracheal deviation present.   Cardiovascular: Normal rate. An irregularly irregular rhythm present.   Pulses:       Radial pulses are 2+ on the right side, and 2+ on the left side.        Dorsalis pedis pulses are 2+ on the right side, and 2+ on the left side.   Pulmonary/Chest: He is intubated. He has decreased breath sounds. He has no wheezes. He has rhonchi in the right lower field. He has no rales.   Abdominal: Soft. Bowel sounds are normal. He exhibits no distension.   Musculoskeletal: He exhibits no edema.   Skin: Skin is warm and dry. Capillary refill takes less than 2 seconds. Bruising noted. No cyanosis. Nails show clubbing.       Vents:  Vent Mode: A/C (10/30/19 1222)  Ventilator Initiated: Yes (10/29/19 1143)  Set Rate: 18 bmp (10/30/19 1222)  Vt Set: 500 mL (10/30/19 1222)  Pressure Support: 0 cmH20 (10/30/19 1222)  PEEP/CPAP: 5 cmH20 (10/30/19 1222)  Oxygen Concentration (%): 45 (10/30/19 1222)  Peak Airway Pressure: 24 cmH2O (10/30/19 1222)  Plateau  Pressure: 26 cmH20 (10/30/19 1222)  Total Ve: 10.5 mL (10/30/19 1222)  F/VT Ratio<105 (RSBI): (!) 30.05 (10/30/19 1222)    Lines/Drains/Airways     Peripherally Inserted Central Catheter Line                 PICC Double Lumen 10/30/19 0445 left brachial less than 1 day          Drain                 NG/OG Tube 10/29/19 1200 [place] sump 16 Fr. Left nostril 1 day         Urethral Catheter 10/29/19 1440 16 Fr. less than 1 day          Airway                 Airway - Non-Surgical 10/29/19 1134 Endotracheal Tube 1 day          Peripheral Intravenous Line                 Peripheral IV - Single Lumen 10/29/19 0815 20 G Right Forearm 1 day         Peripheral IV - Single Lumen 10/29/19 20 G Left Forearm 1 day         Peripheral IV - Single Lumen 10/29/19 1950 20 G Left Forearm less than 1 day                Significant Labs:    CBC/Anemia Profile:  Recent Labs   Lab 10/29/19  0817 10/30/19  0344   WBC 9.08 11.38   HGB 14.1 12.3*   HCT 44.5 41.3    115*   * 106*   RDW 14.0 14.1        Chemistries:  Recent Labs   Lab 10/29/19  0817 10/30/19  0344    141   K 4.7 5.0    110   CO2 25 20*   BUN 38* 50*   CREATININE 1.3 1.4   CALCIUM 9.3 8.6*   ALBUMIN 4.1  --    PROT 7.8  --    BILITOT 1.3*  --    ALKPHOS 139*  --    ALT 23  --    AST 42*  --    MG  --  1.7       Lactic Acid:   Recent Labs   Lab 10/29/19  1254 10/30/19  0024 10/30/19  0344   LACTATE 2.5* 1.7 1.7     Troponin:   Recent Labs   Lab 10/29/19  1446 10/30/19  0024 10/30/19  0344   TROPONINI 0.159* 0.141* 0.118*     All pertinent labs within the past 24 hours have been reviewed.  ABG  Recent Labs   Lab 10/30/19  1003   PH 7.324*   PO2 102*   PCO2 39.8   HCO3 20.7*   BE -5         Significant Imaging:  I have reviewed all pertinent imaging results/findings within the past 24 hours.

## 2019-10-30 NOTE — PROGRESS NOTES
Clinical Pharmacy Progress Note: Coumadin Dosing and Monitoring     Goal INR: 2-3   Indication: Atrial fibrillation with RVR  Lab Results   Component Value Date    INR 3.2 (H) 10/30/2019    INR 2.9 (H) 10/29/2019     Patient has not yet been educated by pharmacist this admission.     Recent dosing history: Patient received warfarin 5 mg PO yesterday  Home dosing regimen: Warfarin 5 mg PO daily  Drug interactions/Factors that may increase INR:  - Patient is currently NPO  - Patient is on antibiotics: metronidazole, cefepime, and vancomycin  - Patient has infection: sepsis secondary to pneumonia.     Plan:   - Due to supra-therapeutic INR of 3.2 today, as well as significant factors that may contribute to increased INR, give decreased dose of Warfarin 2.5 mg PO today.   - Pharmacy will continue to monitor daily PT/INR. Dose adjustments will be made accordingly.      Thank you for allowing us to participate in this patient's care.      Vivian Richards, PharmZAHEER 10/30/2019 1:20 PM

## 2019-10-30 NOTE — HOSPITAL COURSE
10/30:  Still on vent, cont current antibiotics, wean vent as tolerated    10/31:  Extubated this am, will continue to monitor    11/1:   Patient doing well, was informed by ICU staff that girlfriend states patient is a DNR. Discussion was had with Son (Shabbir Huffman) who is power of  [phone] and he confirmed DNR status. Patient likely to be stepped down from ICU today.   11/2: pt seen and examined, chart reviewed, d/w pt's partner and his son. Frail elderly man lying in bed, appears sick. NC and NGT in place, getting TF. CXR shows large R sided pneumonia, no dentures at present. WBC high with L shift. Bun up to 50.   11/3- looks lot better, more alert and responsive. Talking, getting TF via NGT, tolerating it well. Did PT/OT and moving all 4 ext in bed. Bun increased-- hence lasix decreased and free water increased via NGT. Hopefully will be able to take oral food in a day or two.   11/4- continues to make progress, swallow improved, NG d/grao. Was able to eat pureed diet and swallow pills well, no sore throat. Getting stronger, will continue PT/OT. Will give inj B12 IM.  11/5- looks lot better, sitting up in chair, eating drinking a little, walked 35 feet with PT, feels getting stronger. Still has some Phlegm in chest. Ordered Chest PT, IS and Mucinex.   11/6- improving daily, getting stronger and more alert and responsive. Urine dark, Na 150-- needs free water which has to be thickened-- hence not drinking much. Still has some chest gurgling but CXR much better. Arrangements made for In pt Rehab/ SNF for a few days before flying back to [place].   11/7- continues to look better, however failed his MBSS this am. He was also accepted at [facility]. Pt was offered NGT placement vs PEG tube placement in front of Ms Lind and his son Shabbir-- he refused both and prefers to be discharged to Rehab and continue his current Pureed Diet with Nectar thick fluids. His Bun is coming down gradually however his Na is  150. His CXR shows much improvement with clearance of Pneumonia. Pt and his family feel confident that he is recovering well and will continue to do well in the rehab and will be able to fly back to [place] very soon. He was seen and examined and deemed stable to discharge to [facility] today.

## 2019-10-30 NOTE — PROGRESS NOTES
Shaq Huffman 's Coumadin will be dosed and monitored by Pharmacy at the request of MENA Paredes  Indication: Atrial fibrillation with RVR  Target INR is 2-3  INR   Date Value Ref Range Status   10/29/2019 2.9 (H) 0.8 - 1.2 Final     Comment:     Coumadin Therapy:  2.0 - 3.0 for INR for all indicators except mechanical heart valves  and antiphospholipid syndromes which should use 2.5 - 3.5.       Patient will be initiated on a dose of 5 mg per day.      PT/INR will be monitored daily.     Dose adjustments will be made accordingly.      Thank you for allowing us to participate in this patient's care.     Saima Garcia [facility] 10/29/2019 9:00 PM

## 2019-10-30 NOTE — ASSESSMENT & PLAN NOTE
10/29:  Likely 2/2 to aspiration PNA  Sputum culture pending  Blood culture pending  On vanc, cefepime, and flagyl  Continue to wean vent as tolerated   Reviewed blood gas     10/30:  Currently on vent   Sputum cultures pending  Continue current antibiotics   Wean vent as tolerated   Reviewed blood gas today  Will order procal for trend in am

## 2019-10-30 NOTE — PROGRESS NOTES
[facility] Hospital Medicine  Progress Note    Patient Name: Shaq Huffman  MRN: [id number]  Patient Class: IP- Inpatient   Admission Date: 10/29/2019  Length of Stay: 1 days  Attending Physician: Allan Guzman MD  Primary Care Provider: Provider Not[employer]        Subjective:     Principal Problem:Acute respiratory failure with hypoxia and hypercapnia        HPI:  Patient is an 85 y/o male with a PMH of parkinsons, afib, GERD, COPD presents as transfer from [facility] for respiratory failure. Patient was intubated prior to arrival and history was obtained by partner. She reports her and patient were on a cruise leaving out of [place] and patient developed some breathing difficulty. Cruise healthcare professionals were called patient was taken to [facility]. He was intubated there due to respiratory insufficiency with hypercarbia and transferred to [facility]. She denied any sick contacts or recent hospitalizations.     Overview/Hospital Course:  10/30:  Still on vent, cont current antibiotics, wean vent as tolerated    Interval History: Patient hypotensive overnight and was started on phenylephrine. BP improved.    Review of Systems   Unable to perform ROS: Intubated     Objective:     Vital Signs (Most Recent):  Temp: 98.7 °F (37.1 °C) (10/30/19 0715)  Pulse: 101 (10/30/19 1542)  Resp: 18 (10/30/19 1542)  BP: 105/69 (10/30/19 1345)  SpO2: 96 % (10/30/19 1544) Vital Signs (24h Range):  Temp:  [98.3 °F (36.8 °C)-99.6 °F (37.6 °C)] 98.7 °F (37.1 °C)  Pulse:  [] 101  Resp:  [14-34] 18  SpO2:  [95 %-100 %] 96 %  BP: ()/() 105/69     Weight: 77.9 kg (171 lb 11.8 oz)  Body mass index is 21.47 kg/m².    Intake/Output Summary (Last 24 hours) at 10/30/2019 1616  Last data filed at 10/30/2019 1300  Gross per 24 hour   Intake 2244.71 ml   Output 1070 ml   Net 1174.71 ml      Physical Exam   Constitutional: He appears well-developed and well-nourished. No distress.   intubated    HENT:   Head: Normocephalic and atraumatic.   Cardiovascular: Exam reveals no gallop and no friction rub.   No murmur heard.  Irregular irregular   Pulmonary/Chest: Effort normal. No stridor. No respiratory distress. He has no wheezes. He has rales (RLL).   Abdominal: Soft. Bowel sounds are normal. He exhibits no distension and no mass. There is no tenderness. There is no guarding.   Musculoskeletal: He exhibits no edema.   Neurological:   sedated   Skin: Skin is warm. No rash noted. He is not diaphoretic.       Significant Labs:   Recent Lab Results       10/30/19  1003   10/30/19  0449   10/30/19  0344   10/30/19  0024   10/29/19  1722        Allens Test Pass Pass           Anion Gap     11         Aniso     Slight         Appearance, UA         Clear     Bacteria, UA         Occasional     Baso #     0.04         Basophil%     0.4         Bilirubin (UA)         Negative     Site LR RR           BUN, Bld     50         Calcium     8.6         Chloride     110         CO2     20         Color, UA         Yellow     Creatinine     1.4         DelSys Adult Vent Adult Vent           Differential Method     Automated         eGFR if      52         eGFR if non      45  Comment:  Calculation used to obtain the estimated glomerular filtration  rate (eGFR) is the CKD-EPI equation.            Eos #     0.0         Eosinophil%     0.1         FiO2   45           Glucose     75         Glucose, UA         Negative     Gram Stain (Respiratory)               Gran # (ANC)     10.0         Gran%     87.5         Hematocrit     41.3         Hemoglobin     12.3         Hyaline Casts, UA         1     Immature Grans (Abs)     0.03  Comment:  Mild elevation in immature granulocytes is non specific and   can be seen in a variety of conditions including stress response,   acute inflammation, trauma and pregnancy. Correlation with other   laboratory and clinical findings is essential.            Immature Granulocytes     0.3         Coumadin Monitoring INR     3.2  Comment:  Coumadin Therapy:  2.0 - 3.0 for INR for all indicators except mechanical heart valves  and antiphospholipid syndromes which should use 2.5 - 3.5.           Ketones, UA         Negative     Lactate, Giovanni     1.7  Comment:  Falsely low lactic acid results can be found in samples   containing >=13.0 mg/dL total bilirubin and/or >=3.5 mg/dL   direct bilirubin.   1.7  Comment:  Falsely low lactic acid results can be found in samples   containing >=13.0 mg/dL total bilirubin and/or >=3.5 mg/dL   direct bilirubin.         Leukocytes, UA         Negative     Lymph #     0.4         Lymph%     3.5         Magnesium     1.7         MCH     31.5         MCHC     29.8         MCV     106         Microscopic Comment         SEE COMMENT  Comment:  Other formed elements not mentioned in the report are not   present in the microscopic examination.        Mode AC/PRVC AC/PRVC           Mono #     0.9         Mono%     8.2         MPV     9.6         NITRITE UA         Negative     nRBC     0         Occult Blood UA         3+     PEEP   5           pH, UA         6.0     Platelet Estimate     Appears normal         Platelets     115         POC BE -5 -6           POC HCO3 20.7 20.5           POC PCO2 39.8 44.0           POC PH 7.324 7.276           POC PO2 102 102           POC SATURATED O2 97 97           Poik     Slight         Potassium     5.0         Protein, UA         2+  Comment:  Recommend a 24 hour urine protein or a urine   protein/creatinine ratio if globulin induced proteinuria is  clinically suspected.       Protime     32.9         Rate   18           RBC     3.90         RBC, UA         >100     RDW     14.1         Sample ARTERIAL ARTERIAL           Schistocytes     Present         Sodium     141         Specific Gravity, UA         >=1.030     Specimen UA         Urine, Catheterized     Squam Epithel, UA         2     Tear Drop Cells      Occasional         Troponin I     0.118  Comment:  The reference interval for Troponin I represents the 99th percentile   cutoff   for our facility and is consistent with 3rd generation assay   performance.   0.141  Comment:  The reference interval for Troponin I represents the 99th percentile   cutoff   for our facility and is consistent with 3rd generation assay   performance.         UROBILINOGEN UA         1.0     Vt   450           WBC, UA         3     WBC     11.38                          10/29/19  1701   10/29/19  1652   10/29/19  1643        Allens Test   Pass       Anion Gap           Aniso           Appearance, UA           Bacteria, UA           Baso #           Basophil%           Bilirubin (UA)           Site   RR       BUN, Bld           Calcium           Chloride           CO2           Color, UA           Creatinine           DelSys   Adult Vent       Differential Method           eGFR if            eGFR if non            Eos #           Eosinophil%           FiO2   45       Glucose           Glucose, UA           Gram Stain (Respiratory) Few WBC's          No organisms seen         Gran # (ANC)           Gran%           Hematocrit           Hemoglobin           Hyaline Casts, UA           Immature Grans (Abs)           Immature Granulocytes           Coumadin Monitoring INR     2.9  Comment:  Coumadin Therapy:  2.0 - 3.0 for INR for all indicators except mechanical heart valves  and antiphospholipid syndromes which should use 2.5 - 3.5.       Ketones, UA           Lactate, Giovanni           Leukocytes, UA           Lymph #           Lymph%           Magnesium           MCH           MCHC           MCV           Microscopic Comment           Mode   AC/PRVC       Mono #           Mono%           MPV           NITRITE UA           nRBC           Occult Blood UA           PEEP   5       pH, UA           Platelet Estimate           Platelets           POC BE   -7       POC  HCO3   20.0       POC PCO2   41.7       POC PH   7.288       POC PO2   87       POC SATURATED O2   95       Poik           Potassium           Protein, UA           Protime     30.3     Rate   18       RBC           RBC, UA           RDW           Sample   ARTERIAL       Schistocytes           Sodium           Specific Gravity, UA           Specimen UA           Squam Epithel, UA           Tear Drop Cells           Troponin I           UROBILINOGEN UA           Vt   450       WBC, UA           WBC                 Significant Imaging: I have reviewed all pertinent imaging results/findings within the past 24 hours.      Assessment/Plan:      * Acute respiratory failure with hypoxia and hypercapnia on mechanical ventilation  10/29:  Likely 2/2 to aspiration PNA  Sputum culture pending  Blood culture pending  On vanc, cefepime, and flagyl  Continue to wean vent as tolerated   Reviewed blood gas     10/30:  Currently on vent   Sputum cultures pending  Continue current antibiotics   Wean vent as tolerated   Reviewed blood gas today  Will order procal for trend in am           Parkinson disease  10/29:  Resume home meds      Aspiration pneumonia of right lower lobe due to gastric secretions        Atrial fibrillation with RVR  10/29:  Not on rate control medical medications at home  HR ok for now  On coumadin   Pharm to dose   Metoprolol IV PRN     10/30:  Rate controlled       VTE Risk Mitigation (From admission, onward)         Ordered     warfarin (COUMADIN) tablet 5 mg  Daily      10/30/19 1328     warfarin (COUMADIN) tablet 2.5 mg  Once      10/30/19 1328     Place sequential compression device  Until discontinued      10/29/19 1630     IP VTE HIGH RISK PATIENT  Once      10/29/19 1630                Critical care time spent on the evaluation and treatment of severe organ dysfunction, review of pertinent labs and imaging studies, discussions with consulting providers and discussions with patient/family: 35  minutes.      Allan Guzman MD  Department of Hospital Medicine   [facility]

## 2019-10-30 NOTE — PLAN OF CARE
Patient remains ventilated. Neosynephrine and Propofol added due to hypotension and increased restlessness. PICC line inserted via PICC team. Patient remains in A-fib with HR's ranging from 80's-110's. Patient able to follow commands. NG tube connected to low intermittent suction remains in place. Fentanyl and LR continue to infuse. 250 ml/hr IV Fluid bolus administered twice. Patient continues to void 30-50 ml/hr via [place] catheter. Ventilator settings adjusted per MD orders. Patient turned q 2. Waffle mattress and heel boots remain on bed and patient. Patient febrile with TMAX 99.6 axillary. POC reviewed with Patient and significant other at bedside. Will continue to monitor.

## 2019-10-30 NOTE — ASSESSMENT & PLAN NOTE
S/t pneumonia with underlying COPD  Vent settings reviewed and adjusted per abg  VAP prophylaxis  SAT/SBT daily for extubation readiness

## 2019-10-30 NOTE — CONSULTS
"  [facility] -   Adult Nutrition  Consult Note    SUMMARY     Recommendations    Recommendation: 1. Rec TF of Peptamen Intense VHP @ 60 ml/hr. With current propofol infusion, provides 1836 kcal, 133 g pro & 1210 ml free water. Flushes per MD/NP. 2. Hold x residuals > 500 ml. 3. RD to f/u.   Intervention: Collaboration with other providers   Goals: Meet > 85 % EEN/EPN by RD f/u   Nutrition Goal Status: new  Communication of RD Recs: (POC, sticky note, reviewed with NP)    Reason for Assessment    Reason For Assessment: consult  Diagnosis: (Acute respiratory failure)  Relevant Medical History:  AF, COPD  Interdisciplinary Rounds: attended  General Information Comments: RD consulted x TF recs. Pt admitted x respiratory failure. Pt currently in ICU, intubated and sedated w/ propofol. Pt's wife reports no wt loss in the last year. Per pt's wife, pt w/ good appetite and intake PTA. NFPE not performed d/t pt w/ no signs of malnutrition. Reviewed TF recs w/ NP this am.   Nutrition Discharge Planning: pending medical course    Nutrition Risk Screen    Nutrition Risk Screen: no indicators present    Nutrition/Diet History    Spiritual, Cultural Beliefs, Holiness Practices, Values that Affect Care: no     Anthropometrics    Temp: 98.7 °F (37.1 °C)  Height Method: Stated  Height: 6' 3" (190.5 cm)  Height (inches): 75 in  Weight Method: Bed Scale  Weight: 77.9 kg (171 lb 11.8 oz)  Weight (lb): 171.74 lb  Ideal Body Weight (IBW), Male: 196 lb  % Ideal Body Weight, Male (lb): 87.62 lb  BMI (Calculated): 21.5  BMI Grade: 18.5-24.9 - normal       Lab/Procedures/Meds    Pertinent Labs Reviewed: reviewed  BMP  Lab Results   Component Value Date     10/30/2019    K 5.0 10/30/2019     10/30/2019    CO2 20 (L) 10/30/2019    BUN 50 (H) 10/30/2019    CREATININE 1.4 10/30/2019    CALCIUM 8.6 (L) 10/30/2019    ANIONGAP 11 10/30/2019    ESTGFRAFRICA 52 (A) 10/30/2019    EGFRNONAA 45 (A) 10/30/2019     Lab Results "   Component Value Date    CALCIUM 8.6 (L) 10/30/2019     Lab Results   Component Value Date    ALBUMIN 4.1 10/29/2019     No results for input(s): POCTGLUCOSE in the last 24 hours.    Pertinent Medications Reviewed: reviewed      Estimated/Assessed Needs    Weight Used For Calorie Calculations: 77.9 kg (171 lb 11.8 oz)  Energy Calorie Requirements (kcal): 1933  Energy Need Method: [facility]  Protein Requirements: 93- 155 g  Weight Used For Protein Calculations: 77.9 kg (171 lb 11.8 oz)     Estimated Fluid Requirement Method: RDA Method(or per MD)  RDA Method (mL): 1933         Nutrition Prescription Ordered    Current Diet Order: NPO    Evaluation of Received Nutrient/Fluid Intake    Other Calories (kcal): 396(propofol @ 15 ml/hr )     Intake/Output Summary (Last 24 hours) at 10/30/2019 0951  Last data filed at 10/30/2019 0635  Gross per 24 hour   Intake 3294.71 ml   Output 750 ml   Net 2544.71 ml     % Intake of Estimated Energy Needs: 0 - 25 %  % Meal Intake: NPO    Nutrition Risk      2xweekly    Assessment and Plan    Nutrition Problem  Inadequate energy intake     Related to (etiology):   NPO status, no alternative means of nutrition     Signs and Symptoms (as evidenced by):   Meeting < 85 % EEN/EPN    Interventions:  Collaboration with other providers     Nutrition Diagnosis Status:   New        Monitor and Evaluation    Food and Nutrient Intake: energy intake, enteral nutrition intake  Food and Nutrient Adminstration: enteral and parenteral nutrition administration  Anthropometric Measurements: weight  Biochemical Data, Medical Tests and Procedures: electrolyte and renal panel, glucose/endocrine profile  Nutrition-Focused Physical Findings: overall appearance       Nutrition Follow-Up    RD Follow-up?: Yes

## 2019-10-30 NOTE — PLAN OF CARE
Met with long term girlfriend, Alina Atwood as patient was intubated. Patient was independent with adls prior to hospitalization. He used a straight cane occasionally for long distances. Patient and girlfriend were on a [place] boat cruise when he became ill.  They were taking to the ER at [facility] and transferred here.  He has Parkinsonism with mild tremors, afib and COPD.  He does not have home 02.  Girlfriend is POA and patient does have a living will all at home.  Will try to see if she can get copies sent to her through the .  Plan is to fly back home on discharge.  Discussed pending hospital course patient may have some Post Acute needs before he is able to fly back home.  Girlfriend verbalized understanding.    Post hospital needs or services at this time are based on hospital progress.  Updated white board with 's name and number. Transitional Care Folder, Discharge Planning Begins on Admission pamphlet, [facility] Bedside Delivery pamphlet, Advance Directive information given to patient along with the contact information given.Instructed patient or family to call with any questions or concerns.    D/c plan:  TBD  D/c transportation: TBD  Preferred Pharmacy:  [facility]; [address] [place]   [phone]  Bedside Pharmacy deliver: Yes  [facility]: declined       10/30/19 1245   Discharge Assessment   Assessment Type Discharge Planning Assessment   Confirmed/corrected address and phone number on facesheet? Yes   Assessment information obtained from? Caregiver   Expected Length of Stay (days)   (tbd)   Communicated expected length of stay with patient/caregiver yes   Prior to hospitilization cognitive status: Alert/Oriented   Prior to hospitalization functional status: Independent  (Occasionally used straight cane if going long distances)   Current cognitive status:   (Unable to determine, patient intubated)   Current Functional Status:   (unable to  determine)   Facility Arrived From: [facility]; [place]   Lives With significant other   Able to Return to Prior Arrangements yes   Is patient able to care for self after discharge? Unable to determine at this time (comments)   Who are your caregiver(s) and their phone number(s)? Alina Atwood, long term Girlfriend, [phone]   Patient's perception of discharge disposition other (comments)  (unsure as patient lives in [facility])   Readmission Within the Last 30 Days no previous admission in last 30 days   Patient currently being followed by outpatient case management? No   Patient currently receives any other outside agency services? No   Equipment Currently Used at Home cane, straight;nebulizer   Do you have any problems affording any of your prescribed medications? No   Is the patient taking medications as prescribed? yes   Does the patient have transportation home? No  (TBD)   Transportation Anticipated other (see comments)   Does the patient receive services at the Coumadin Clinic? No   Discharge Plan A Home with family   DME Needed Upon Discharge    (tbd)   Patient/Family in Agreement with Plan yes

## 2019-10-30 NOTE — ASSESSMENT & PLAN NOTE
S/t pneumonia  1L IVF in ED;  and lasix home med concerning for heart failure but not endorsed by SO  Continue gentle IV hydration; pressor to keep MAP > 65  ICU hemodynamic monitoring  Blood cultures obtained in ED  Send sputum and check UA for completeness  Initiate broad spectrum empiric aspiration pneumonia coverage  Monitor temp, wbc, culture info, and repeat lactate in am

## 2019-10-30 NOTE — PROGRESS NOTES
Low BP  Septic shock/pneumonia  I > O  PLR - no rise in BP; already on farhan  Place central line/PICC  Midazolam changed to prn  Avoid oversedation if possible; has been agitated  Now resting on video      0327 PICC team available- try to place PICC; INR 2.9     0504 Ppl 26; ABG resp //met acidosis  Try higher Vt as pt quite tall; also inc peak flow and watch for autoPEEP worsening- about 4.9 now   Monitor ABG; watch for Ppk rising

## 2019-10-31 PROBLEM — J43.1 PANLOBULAR EMPHYSEMA: Chronic | Status: ACTIVE | Noted: 2019-10-29

## 2019-10-31 PROBLEM — T45.511A COUMADIN TOXICITY: Status: ACTIVE | Noted: 2019-10-31

## 2019-10-31 LAB
ALLENS TEST: ABNORMAL
ANION GAP SERPL CALC-SCNC: 8 MMOL/L (ref 8–16)
ANISOCYTOSIS BLD QL SMEAR: SLIGHT
BASOPHILS # BLD AUTO: 0.05 K/UL (ref 0–0.2)
BASOPHILS NFR BLD: 0.4 % (ref 0–1.9)
BUN SERPL-MCNC: 51 MG/DL (ref 8–23)
CALCIUM SERPL-MCNC: 8.8 MG/DL (ref 8.7–10.5)
CHLORIDE SERPL-SCNC: 110 MMOL/L (ref 95–110)
CO2 SERPL-SCNC: 21 MMOL/L (ref 23–29)
CREAT SERPL-MCNC: 1.3 MG/DL (ref 0.5–1.4)
DACRYOCYTES BLD QL SMEAR: ABNORMAL
DELSYS: ABNORMAL
DIFFERENTIAL METHOD: ABNORMAL
EOSINOPHIL # BLD AUTO: 0.4 K/UL (ref 0–0.5)
EOSINOPHIL NFR BLD: 2.9 % (ref 0–8)
ERYTHROCYTE [DISTWIDTH] IN BLOOD BY AUTOMATED COUNT: 14.2 % (ref 11.5–14.5)
ERYTHROCYTE [SEDIMENTATION RATE] IN BLOOD BY WESTERGREN METHOD: 18 MM/H
EST. GFR  (AFRICAN AMERICAN): 57 ML/MIN/1.73 M^2
EST. GFR  (NON AFRICAN AMERICAN): 49 ML/MIN/1.73 M^2
FIO2: 45
GLUCOSE SERPL-MCNC: 93 MG/DL (ref 70–110)
HCO3 UR-SCNC: 22.3 MMOL/L (ref 24–28)
HCT VFR BLD AUTO: 38.1 % (ref 40–54)
HGB BLD-MCNC: 12.1 G/DL (ref 14–18)
IMM GRANULOCYTES # BLD AUTO: 0.07 K/UL (ref 0–0.04)
IMM GRANULOCYTES NFR BLD AUTO: 0.6 % (ref 0–0.5)
INR PPP: 4.7 (ref 0.8–1.2)
LYMPHOCYTES # BLD AUTO: 0.4 K/UL (ref 1–4.8)
LYMPHOCYTES NFR BLD: 2.9 % (ref 18–48)
MAGNESIUM SERPL-MCNC: 1.8 MG/DL (ref 1.6–2.6)
MCH RBC QN AUTO: 33.2 PG (ref 27–31)
MCHC RBC AUTO-ENTMCNC: 31.8 G/DL (ref 32–36)
MCV RBC AUTO: 105 FL (ref 82–98)
MODE: ABNORMAL
MONOCYTES # BLD AUTO: 0.9 K/UL (ref 0.3–1)
MONOCYTES NFR BLD: 7.2 % (ref 4–15)
NEUTROPHILS # BLD AUTO: 10.8 K/UL (ref 1.8–7.7)
NEUTROPHILS NFR BLD: 86 % (ref 38–73)
NRBC BLD-RTO: 0 /100 WBC
PCO2 BLDA: 50.3 MMHG (ref 35–45)
PEEP: 5
PH SMN: 7.25 [PH] (ref 7.35–7.45)
PLATELET # BLD AUTO: 110 K/UL (ref 150–350)
PLATELET BLD QL SMEAR: ABNORMAL
PMV BLD AUTO: 10.1 FL (ref 9.2–12.9)
PO2 BLDA: 103 MMHG (ref 80–100)
POC BE: -5 MMOL/L
POC SATURATED O2: 97 % (ref 95–100)
POIKILOCYTOSIS BLD QL SMEAR: SLIGHT
POTASSIUM SERPL-SCNC: 4.6 MMOL/L (ref 3.5–5.1)
PROCALCITONIN SERPL IA-MCNC: 21.58 NG/ML
PROTHROMBIN TIME: 47.8 SEC (ref 9–12.5)
RBC # BLD AUTO: 3.64 M/UL (ref 4.6–6.2)
SAMPLE: ABNORMAL
SITE: ABNORMAL
SODIUM SERPL-SCNC: 139 MMOL/L (ref 136–145)
VT: 500
WBC # BLD AUTO: 12.57 K/UL (ref 3.9–12.7)

## 2019-10-31 PROCEDURE — S0030 INJECTION, METRONIDAZOLE: HCPCS | Performed by: FAMILY MEDICINE

## 2019-10-31 PROCEDURE — S0028 INJECTION, FAMOTIDINE, 20 MG: HCPCS | Performed by: FAMILY MEDICINE

## 2019-10-31 PROCEDURE — 63600175 PHARM REV CODE 636 W HCPCS: Performed by: FAMILY MEDICINE

## 2019-10-31 PROCEDURE — 99291 PR CRITICAL CARE, E/M 30-74 MINUTES: ICD-10-PCS | Mod: ,,, | Performed by: NURSE PRACTITIONER

## 2019-10-31 PROCEDURE — 82803 BLOOD GASES ANY COMBINATION: CPT

## 2019-10-31 PROCEDURE — 99291 CRITICAL CARE FIRST HOUR: CPT | Mod: ,,, | Performed by: NURSE PRACTITIONER

## 2019-10-31 PROCEDURE — 25000003 PHARM REV CODE 250: Performed by: INTERNAL MEDICINE

## 2019-10-31 PROCEDURE — 20000000 HC ICU ROOM

## 2019-10-31 PROCEDURE — 99900035 HC TECH TIME PER 15 MIN (STAT)

## 2019-10-31 PROCEDURE — 25000242 PHARM REV CODE 250 ALT 637 W/ HCPCS: Performed by: FAMILY MEDICINE

## 2019-10-31 PROCEDURE — 85025 COMPLETE CBC W/AUTO DIFF WBC: CPT

## 2019-10-31 PROCEDURE — 36415 COLL VENOUS BLD VENIPUNCTURE: CPT

## 2019-10-31 PROCEDURE — 63600175 PHARM REV CODE 636 W HCPCS: Performed by: NURSE PRACTITIONER

## 2019-10-31 PROCEDURE — 80048 BASIC METABOLIC PNL TOTAL CA: CPT

## 2019-10-31 PROCEDURE — A4216 STERILE WATER/SALINE, 10 ML: HCPCS | Performed by: INTERNAL MEDICINE

## 2019-10-31 PROCEDURE — 83735 ASSAY OF MAGNESIUM: CPT

## 2019-10-31 PROCEDURE — 36600 WITHDRAWAL OF ARTERIAL BLOOD: CPT

## 2019-10-31 PROCEDURE — 94003 VENT MGMT INPAT SUBQ DAY: CPT

## 2019-10-31 PROCEDURE — 25000003 PHARM REV CODE 250: Performed by: FAMILY MEDICINE

## 2019-10-31 PROCEDURE — 31720 CLEARANCE OF AIRWAYS: CPT

## 2019-10-31 PROCEDURE — 25000242 PHARM REV CODE 250 ALT 637 W/ HCPCS: Performed by: NURSE PRACTITIONER

## 2019-10-31 PROCEDURE — 27000221 HC OXYGEN, UP TO 24 HOURS

## 2019-10-31 PROCEDURE — 85610 PROTHROMBIN TIME: CPT

## 2019-10-31 PROCEDURE — 25000003 PHARM REV CODE 250: Performed by: NURSE PRACTITIONER

## 2019-10-31 PROCEDURE — 94640 AIRWAY INHALATION TREATMENT: CPT

## 2019-10-31 PROCEDURE — 84145 PROCALCITONIN (PCT): CPT

## 2019-10-31 RX ORDER — ARFORMOTEROL TARTRATE 15 UG/2ML
15 SOLUTION RESPIRATORY (INHALATION) EVERY 12 HOURS
Status: DISCONTINUED | OUTPATIENT
Start: 2019-10-31 | End: 2019-11-07 | Stop reason: HOSPADM

## 2019-10-31 RX ORDER — BISACODYL 10 MG
10 SUPPOSITORY, RECTAL RECTAL DAILY PRN
Status: DISCONTINUED | OUTPATIENT
Start: 2019-10-31 | End: 2019-11-07 | Stop reason: HOSPADM

## 2019-10-31 RX ORDER — POLYETHYLENE GLYCOL 3350 17 G/17G
17 POWDER, FOR SOLUTION ORAL DAILY
Status: DISCONTINUED | OUTPATIENT
Start: 2019-10-31 | End: 2019-11-07 | Stop reason: HOSPADM

## 2019-10-31 RX ORDER — WARFARIN SODIUM 5 MG/1
5 TABLET ORAL DAILY
Status: DISCONTINUED | OUTPATIENT
Start: 2019-11-08 | End: 2019-11-02

## 2019-10-31 RX ORDER — BUDESONIDE 0.5 MG/2ML
0.5 INHALANT ORAL EVERY 12 HOURS
Status: DISCONTINUED | OUTPATIENT
Start: 2019-10-31 | End: 2019-11-07 | Stop reason: HOSPADM

## 2019-10-31 RX ORDER — ARFORMOTEROL TARTRATE 15 UG/2ML
15 SOLUTION RESPIRATORY (INHALATION) 2 TIMES DAILY
Status: DISCONTINUED | OUTPATIENT
Start: 2019-10-31 | End: 2019-10-31

## 2019-10-31 RX ADMIN — IPRATROPIUM BROMIDE AND ALBUTEROL SULFATE 3 ML: .5; 3 SOLUTION RESPIRATORY (INHALATION) at 05:10

## 2019-10-31 RX ADMIN — CHLORHEXIDINE GLUCONATE 0.12% ORAL RINSE 15 ML: 1.2 LIQUID ORAL at 08:10

## 2019-10-31 RX ADMIN — IPRATROPIUM BROMIDE AND ALBUTEROL SULFATE 3 ML: .5; 3 SOLUTION RESPIRATORY (INHALATION) at 07:10

## 2019-10-31 RX ADMIN — CARBIDOPA AND LEVODOPA 1 TABLET: 25; 100 TABLET ORAL at 08:10

## 2019-10-31 RX ADMIN — CEFEPIME HYDROCHLORIDE 2 G: 2 INJECTION, SOLUTION INTRAVENOUS at 04:10

## 2019-10-31 RX ADMIN — FAMOTIDINE 20 MG: 10 INJECTION INTRAVENOUS at 08:10

## 2019-10-31 RX ADMIN — CARBIDOPA AND LEVODOPA 1 TABLET: 25; 100 TABLET ORAL at 04:10

## 2019-10-31 RX ADMIN — METRONIDAZOLE 500 MG: 500 INJECTION, SOLUTION INTRAVENOUS at 10:10

## 2019-10-31 RX ADMIN — IPRATROPIUM BROMIDE AND ALBUTEROL SULFATE 3 ML: .5; 3 SOLUTION RESPIRATORY (INHALATION) at 12:10

## 2019-10-31 RX ADMIN — IPRATROPIUM BROMIDE AND ALBUTEROL SULFATE 3 ML: .5; 3 SOLUTION RESPIRATORY (INHALATION) at 11:10

## 2019-10-31 RX ADMIN — METRONIDAZOLE 500 MG: 500 INJECTION, SOLUTION INTRAVENOUS at 01:10

## 2019-10-31 RX ADMIN — Medication 10 ML: at 05:10

## 2019-10-31 RX ADMIN — CEFEPIME HYDROCHLORIDE 2 G: 2 INJECTION, SOLUTION INTRAVENOUS at 02:10

## 2019-10-31 RX ADMIN — BUDESONIDE 0.5 MG: 0.5 INHALANT RESPIRATORY (INHALATION) at 07:10

## 2019-10-31 RX ADMIN — Medication 10 ML: at 12:10

## 2019-10-31 RX ADMIN — ARFORMOTEROL TARTRATE 15 MCG: 15 SOLUTION RESPIRATORY (INHALATION) at 07:10

## 2019-10-31 RX ADMIN — MELATONIN 1000 UNITS: at 08:10

## 2019-10-31 RX ADMIN — PROPOFOL 10 MCG/KG/MIN: 10 INJECTION, EMULSION INTRAVENOUS at 03:10

## 2019-10-31 RX ADMIN — CARBIDOPA AND LEVODOPA 1 TABLET: 25; 100 TABLET ORAL at 09:10

## 2019-10-31 RX ADMIN — METRONIDAZOLE 500 MG: 500 INJECTION, SOLUTION INTRAVENOUS at 06:10

## 2019-10-31 RX ADMIN — Medication 50 MCG: at 01:10

## 2019-10-31 RX ADMIN — Medication 0.5 MCG/KG/MIN: at 05:10

## 2019-10-31 RX ADMIN — IPRATROPIUM BROMIDE AND ALBUTEROL SULFATE 3 ML: .5; 3 SOLUTION RESPIRATORY (INHALATION) at 03:10

## 2019-10-31 RX ADMIN — POLYETHYLENE GLYCOL (3350) 17 G: 17 POWDER, FOR SOLUTION ORAL at 10:10

## 2019-10-31 NOTE — PROGRESS NOTES
Clinical Pharmacy Progress Note: Coumadin Dosing and Monitoring     Goal INR: 2-3   Indication: Atrial fibrillation with RVR    Lab Results   Component Value Date    INR 4.7 (H) 10/31/2019    INR 3.2 (H) 10/30/2019    INR 2.9 (H) 10/29/2019     Patient has not yet been educated by pharmacist this admission. (Note: patient was intubated as of yesterday.) Will attempt to  today if patient is successfully extubated.    Recent dosing history: Warfarin 5 mg on 10/29/19, Warfarin 2.5 mg on 10/30/19.   Home dosing regimen: Warfarin 5 mg PO daily.  Drug interactions/Factors that may affect INR:  - Patient is currently on tube feedings, which may help reduce the INR  - Patient is on antibiotics: metronidazole, and cefepime which can increase the INR  - Patient has infection: sepsis secondary to pneumonia, which can increase the INR    Plan:  - Due to supra-therapeutic INR, HOLD warfarin.   - A warfarin place gracia order has been entered into Epic for pharmacy follow-up reporting.   - Pharmacy will continue to monitor daily PT/INR. Dose adjustments will be made accordingly.      Thank you for allowing us to participate in this patient's care.      Vivian Richards, Jaimie 10/31/2019 9:44 AM

## 2019-10-31 NOTE — ASSESSMENT & PLAN NOTE
10/31:  Continue vanc, cefepime, and flagyl  procalcitonin trending up  WBC within normal limits  Chest xray ordered  Continue to monitor for response  Repeat procalcitonin in am  Sputum culture normal mikel

## 2019-10-31 NOTE — SUBJECTIVE & OBJECTIVE
Review of Systems   Unable to perform ROS: Intubated         Objective:     Vital Signs (Most Recent):  Temp: 99.1 °F (37.3 °C) (10/31/19 0705)  Pulse: 85 (10/31/19 0733)  Resp: 18 (10/31/19 0733)  BP: 114/72 (10/31/19 0733)  SpO2: 95 % (10/31/19 0953) Vital Signs (24h Range):  Temp:  [98.4 °F (36.9 °C)-99.1 °F (37.3 °C)] 99.1 °F (37.3 °C)  Pulse:  [] 85  Resp:  [15-22] 18  SpO2:  [91 %-99 %] 95 %  BP: ()/(47-82) 114/72     Weight: 77.9 kg (171 lb 11.8 oz)  Body mass index is 21.47 kg/m².      Intake/Output Summary (Last 24 hours) at 10/31/2019 1019  Last data filed at 10/31/2019 0705  Gross per 24 hour   Intake 2768.14 ml   Output 800 ml   Net 1968.14 ml       Physical Exam   Constitutional: He appears well-developed. He appears lethargic. He is easily aroused. He has a sickly appearance. He appears ill. No distress. Nasal cannula in place.   Frail appearing with barrel chest   HENT:   Head: Atraumatic.   Nose:       Mouth/Throat: Oropharynx is clear and moist.   Mild to mod bilat temporal wasting   Eyes: Pupils are equal, round, and reactive to light. Conjunctivae are normal.   Neck: Neck supple. No JVD present. No tracheal deviation present.   Cardiovascular: Normal rate. An irregularly irregular rhythm present.   Pulses:       Radial pulses are 2+ on the right side, and 2+ on the left side.        Dorsalis pedis pulses are 2+ on the right side, and 2+ on the left side.   Pulmonary/Chest: No accessory muscle usage. No tachypnea. No respiratory distress. He has decreased breath sounds. He has no wheezes. He has rhonchi (and upper airway post extubation) in the right lower field. He has no rales.   Abdominal: Soft. He exhibits no distension. Bowel sounds are decreased. There is no tenderness.   Genitourinary:   Genitourinary Comments: Vallejo in place   Musculoskeletal: He exhibits no edema.   Mild general edema   Lymphadenopathy:     He has no cervical adenopathy.   Neurological: He is easily aroused. He  appears lethargic.   But nods head to questions and follow simple one-step commands   Skin: Skin is warm and dry. Capillary refill takes less than 2 seconds. Bruising noted. No cyanosis. Nails show clubbing.            Vents:  Vent Mode: A/C (10/31/19 0733)  Ventilator Initiated: Yes (10/29/19 1143)  Set Rate: 18 bmp (10/31/19 0733)  Vt Set: 450 mL (10/31/19 0733)  Pressure Support: 0 cmH20 (10/31/19 0733)  PEEP/CPAP: 5 cmH20 (10/31/19 0733)  Oxygen Concentration (%): 35 (10/31/19 0733)  Peak Airway Pressure: 20 cmH2O (10/31/19 0733)  Plateau Pressure: 15 cmH20 (10/31/19 0733)  Total Ve: 11 mL (10/31/19 0733)  F/VT Ratio<105 (RSBI): (!) 26.16 (10/31/19 0733)    Lines/Drains/Airways     Peripherally Inserted Central Catheter Line                 PICC Double Lumen 10/30/19 0445 left brachial 1 day          Drain                 NG/OG Tube 10/29/19 1200 [place] sump 16 Fr. Left nostril 1 day         Urethral Catheter 10/29/19 1440 16 Fr. 1 day          Peripheral Intravenous Line                 Peripheral IV - Single Lumen 10/29/19 0815 20 G Right Forearm 2 days         Peripheral IV - Single Lumen 10/29/19 20 G Left Forearm 2 days         Peripheral IV - Single Lumen 10/29/19 1950 20 G Left Forearm 1 day                Significant Labs:    CBC/Anemia Profile:  Recent Labs   Lab 10/30/19  0344 10/31/19  0345   WBC 11.38 12.57   HGB 12.3* 12.1*   HCT 41.3 38.1*   * 110*   * 105*   RDW 14.1 14.2        Chemistries:  Recent Labs   Lab 10/30/19  0344 10/31/19  0344    139   K 5.0 4.6    110   CO2 20* 21*   BUN 50* 51*   CREATININE 1.4 1.3   CALCIUM 8.6* 8.8   MG 1.7 1.8       ABGs:   Recent Labs   Lab 10/31/19  0541   PH 7.255*   PCO2 50.3*   HCO3 22.3*   POCSATURATED 97   BE -5     Coagulation:   Recent Labs   Lab 10/31/19  0345   INR 4.7*     All pertinent labs within the past 24 hours have been reviewed.    Significant Imaging:  CXR: I have reviewed all pertinent results/findings within the past  24 hours and my personal findings are:  persistent RML/RLL infiltrate

## 2019-10-31 NOTE — PROGRESS NOTES
[facility] -   Critical Care Medicine  Progress Note    Patient Name: Shaq Huffman  MRN: [id number]  Admission Date: 10/29/2019  Hospital Length of Stay: 2 days  Code Status: No Order  Attending Provider: Allan Guzman MD  Primary Care Provider: Provider Notinsystem   Principal Problem: Acute respiratory failure with hypoxia and hypercapnia    Subjective:     HPI:  Frail appearing 86 year old male with PMH including COPD, atrial fib on coumadin, parkinson's, and glaucoma presented to ED at [facility] via EMS  Pt traveling via [place] boat cruise with significant other from [place]  SO reports complaints of reflux, fullness, and discomfort after dinner last evening; then this am woke with increased coughing and shortly became less responsive to conversation when she called for assistance from onboard EMT  ED evaluation revealed atrial fib with RVR 150s, RR 40s on trial BiPap @ 80%  Intubated after failing trial of BiPap; CXR shows RLL infiltrate concerning for aspiration in addition to coarse interstitial markings suspicious for underlying ILD/fibrosis/or scarring  Lab significant for creatinine 1.3; troponin initial 0.029 sienna to 0.159; ; lactic acid initial 2.7 decreased to 2.5; procalcitonin 1.49; initial abg respiratory acidosis with hypoxemia and hypercapnia    Hospital/ICU Course:  Admitted to ICU with OETT on mechanical ventilation with propofol sedation and marginal BP  10/30 - remains sedated and intubated, requiring low dose neosynephrine to support BP  10/31 - Sials SAT/SBT this AM and extubated.  Still weak.  VSS and no distress.  Still on TF and low dose Marco infusion.  Discussed care with spouse and son at bedside    Review of Systems   Unable to perform ROS: Intubated         Objective:     Vital Signs (Most Recent):  Temp: 99.1 °F (37.3 °C) (10/31/19 0705)  Pulse: 85 (10/31/19 0733)  Resp: 18 (10/31/19 0733)  BP: 114/72 (10/31/19 0733)  SpO2: 95 % (10/31/19 0953) Vital Signs (24h  Range):  Temp:  [98.4 °F (36.9 °C)-99.1 °F (37.3 °C)] 99.1 °F (37.3 °C)  Pulse:  [] 85  Resp:  [15-22] 18  SpO2:  [91 %-99 %] 95 %  BP: ()/(47-82) 114/72     Weight: 77.9 kg (171 lb 11.8 oz)  Body mass index is 21.47 kg/m².      Intake/Output Summary (Last 24 hours) at 10/31/2019 1019  Last data filed at 10/31/2019 0705  Gross per 24 hour   Intake 2768.14 ml   Output 800 ml   Net 1968.14 ml       Physical Exam   Constitutional: He appears well-developed. He appears lethargic. He is easily aroused. He has a sickly appearance. He appears ill. No distress. Nasal cannula in place.   Frail appearing with barrel chest   HENT:   Head: Atraumatic.   Nose:       Mouth/Throat: Oropharynx is clear and moist.   Mild to mod bilat temporal wasting   Eyes: Pupils are equal, round, and reactive to light. Conjunctivae are normal.   Neck: Neck supple. No JVD present. No tracheal deviation present.   Cardiovascular: Normal rate. An irregularly irregular rhythm present.   Pulses:       Radial pulses are 2+ on the right side, and 2+ on the left side.        Dorsalis pedis pulses are 2+ on the right side, and 2+ on the left side.   Pulmonary/Chest: No accessory muscle usage. No tachypnea. No respiratory distress. He has decreased breath sounds. He has no wheezes. He has rhonchi (and upper airway post extubation) in the right lower field. He has no rales.   Abdominal: Soft. He exhibits no distension. Bowel sounds are decreased. There is no tenderness.   Genitourinary:   Genitourinary Comments: Vallejo in place   Musculoskeletal: He exhibits no edema.   Mild general edema   Lymphadenopathy:     He has no cervical adenopathy.   Neurological: He is easily aroused. He appears lethargic.   But nods head to questions and follow simple one-step commands   Skin: Skin is warm and dry. Capillary refill takes less than 2 seconds. Bruising noted. No cyanosis. Nails show clubbing.            Vents:  Vent Mode: A/C (10/31/19 2805)  Ventilator  Initiated: Yes (10/29/19 1143)  Set Rate: 18 bmp (10/31/19 0733)  Vt Set: 450 mL (10/31/19 0733)  Pressure Support: 0 cmH20 (10/31/19 0733)  PEEP/CPAP: 5 cmH20 (10/31/19 0733)  Oxygen Concentration (%): 35 (10/31/19 0733)  Peak Airway Pressure: 20 cmH2O (10/31/19 0733)  Plateau Pressure: 15 cmH20 (10/31/19 0733)  Total Ve: 11 mL (10/31/19 0733)  F/VT Ratio<105 (RSBI): (!) 26.16 (10/31/19 0733)    Lines/Drains/Airways     Peripherally Inserted Central Catheter Line                 PICC Double Lumen 10/30/19 0445 left brachial 1 day          Drain                 NG/OG Tube 10/29/19 1200 [place] sump 16 Fr. Left nostril 1 day         Urethral Catheter 10/29/19 1440 16 Fr. 1 day          Peripheral Intravenous Line                 Peripheral IV - Single Lumen 10/29/19 0815 20 G Right Forearm 2 days         Peripheral IV - Single Lumen 10/29/19 20 G Left Forearm 2 days         Peripheral IV - Single Lumen 10/29/19 1950 20 G Left Forearm 1 day                Significant Labs:    CBC/Anemia Profile:  Recent Labs   Lab 10/30/19  0344 10/31/19  0345   WBC 11.38 12.57   HGB 12.3* 12.1*   HCT 41.3 38.1*   * 110*   * 105*   RDW 14.1 14.2        Chemistries:  Recent Labs   Lab 10/30/19  0344 10/31/19  0344    139   K 5.0 4.6    110   CO2 20* 21*   BUN 50* 51*   CREATININE 1.4 1.3   CALCIUM 8.6* 8.8   MG 1.7 1.8       ABGs:   Recent Labs   Lab 10/31/19  0541   PH 7.255*   PCO2 50.3*   HCO3 22.3*   POCSATURATED 97   BE -5     Coagulation:   Recent Labs   Lab 10/31/19  0345   INR 4.7*     All pertinent labs within the past 24 hours have been reviewed.    Significant Imaging:  CXR: I have reviewed all pertinent results/findings within the past 24 hours and my personal findings are:  persistent RML/RLL infiltrate      ABG  Recent Labs   Lab 10/31/19  0541   PH 7.255*   PO2 103*   PCO2 50.3*   HCO3 22.3*   BE -5     Assessment/Plan:     Neuro  Parkinson disease  Continue sinemet via NG    Pulmonary  * Acute  respiratory failure with hypoxia and hypercapnia on mechanical ventilation  Tolerated SAT/SBT and extubated this AM  Close ICU monitoring post extubation  Cont NC O2 and cont pulse ox monitoring    Panlobular emphysema  No evidence of acute exacerbation  Add LABA and ICS    Aspiration pneumonia of right lower lobe due to gastric secretions  CXR daily   Cont Cefepime and Flagyl  Stop Vanc  Sputum and Blood cultures NGTD  Encourage C&DB  NPO w/ aspiration precautions  NT suction PRN  Cont Duonebs    Cardiac/Vascular  Atrial fibrillation with RVR  Rate controlled  INR 4.7  Cont cardiac monitoring    Hematology  Coumadin toxicity  Coumadin held  Daily INR  Pharmacy dosing      Preventive Measures and Monitoring:   Stress Ulcer: Pepcid  Nutrition: TF  Glucose control: stable  Bowel prophylaxis: Miralax and PRN Dulcolax  DVT prophylaxis: INR 4.7  Hx CAD on B-Blocker: no hx CAD  Head of Bed/Reposition: Elevate HOB and turn Q1-2 hours   Early Mobility: bed rest  SAT/SBT: passed  RASS goal: SAT  Vent Day: #3  NG Day: #3  Central Line LUE PICC Day: #2  Vallejo Day:   IVAB Day: #3  Code Status: Full    Counseling/Consultation:I have discussed the care of this patient in detail with the bedside nursing staff and Dr. Alonso and Dr. Guzman    Critical Care Time: 51 minutes  Critical secondary to Patient has a condition that poses threat to life and bodily function: Acute Resp Failure intubated on mechanical ventilation      Critical care was time spent personally by me on the following activities: development of treatment plan with patient or surrogate and bedside caregivers, discussions with consultants, evaluation of patient's response to treatment, examination of patient, ordering and performing treatments and interventions, ordering and review of laboratory studies, ordering and review of radiographic studies, pulse oximetry, re-evaluation of patient's condition. This critical care time did not overlap with that of any other  provider or involve time for any procedures.     Raoul Cobos NP  Critical Care Medicine  [facility]

## 2019-10-31 NOTE — ASSESSMENT & PLAN NOTE
CXR daily   Cont Cefepime and Flagyl  Stop Vanc  Sputum and Blood cultures NGTD  Encourage C&DB  NPO w/ aspiration precautions  NT suction PRN  Cont Duonebs

## 2019-10-31 NOTE — PLAN OF CARE
No acute changes today.  Pt remains on very low dose farhan to keep map within desired range.  Pts heart rate less than 120 all day.  Pt tolerated SAT, however, no SBT performed per NP orders.  Pt started on tube feeds today and tolerating.  Plan is to perform SAT and SBT tomorrow provided that pts pH remains WNL.

## 2019-10-31 NOTE — PLAN OF CARE
POC and intervention discussed with FEDERICO HAYES.  Patient remains intubated and sedated - unable to educate.  Hemodynamically stable on low dose Marco gtt.  Heart in Afib - rate controlled.  Oxygenating well on Ventilator - see ABG's.  Tolerating TF's.  Afebrile.  Urine output adequate.  Turned q2h with wedge.  Waffle mattress ON.  Heel protector boots ON.

## 2019-10-31 NOTE — PROGRESS NOTES
[facility] Hospital Medicine  Progress Note    Patient Name: Shaq Huffman  MRN: [id number]  Patient Class: IP- Inpatient   Admission Date: 10/29/2019  Length of Stay: 2 days  Attending Physician: Allan Guzman MD  Primary Care Provider: Provider Notinsystem        Subjective:     Principal Problem:Acute respiratory failure with hypoxia and hypercapnia        HPI:  Patient is an 85 y/o male with a PMH of parkinsons, afib, GERD, COPD presents as transfer from [facility] for respiratory failure. Patient was intubated prior to arrival and history was obtained by partner. She reports her and patient were on a cruise leaving out of [place] and patient developed some breathing difficulty. Cruise healthcare professionals were called patient was taken to [facility]. He was intubated there due to respiratory insufficiency with hypercarbia and transferred to [facility]. She denied any sick contacts or recent hospitalizations.     Overview/Hospital Course:  10/30:  Still on vent, cont current antibiotics, wean vent as tolerated    10/31:  Extubated this am, will continue to monitor    Interval History: Patient was extubated this am. Currently tolerating nasal canula. Sleeping at the moment but arousable.     Review of Systems   Unable to perform ROS: Mental status change     Objective:     Vital Signs (Most Recent):  Temp: 98.8 °F (37.1 °C) (10/31/19 1505)  Pulse: 104 (10/31/19 1541)  Resp: (!) 24 (10/31/19 1541)  BP: 110/61 (10/31/19 1500)  SpO2: 95 % (10/31/19 1541) Vital Signs (24h Range):  Temp:  [98.4 °F (36.9 °C)-99.1 °F (37.3 °C)] 98.8 °F (37.1 °C)  Pulse:  [] 104  Resp:  [14-26] 24  SpO2:  [90 %-99 %] 95 %  BP: ()/(46-78) 110/61     Weight: 77.9 kg (171 lb 11.8 oz)  Body mass index is 21.47 kg/m².    Intake/Output Summary (Last 24 hours) at 10/31/2019 1629  Last data filed at 10/31/2019 1300  Gross per 24 hour   Intake 3184.11 ml   Output 925 ml   Net 2259.11 ml      Physical  Exam   Constitutional: He appears well-developed and well-nourished. No distress.   Lethargic    HENT:   Head: Normocephalic and atraumatic.   Cardiovascular: Exam reveals no gallop and no friction rub.   No murmur heard.  Irregular irregular   Pulmonary/Chest: Effort normal. No stridor. No respiratory distress. He has no wheezes. He has rales (RLL).   Abdominal: Soft. Bowel sounds are normal. He exhibits no distension and no mass. There is no tenderness. There is no guarding.   Musculoskeletal: He exhibits no edema.   Neurological:   sedated   Skin: Skin is warm. No rash noted. He is not diaphoretic.       Significant Labs:   Recent Lab Results       10/31/19  0541   10/31/19  0345   10/31/19  0344        Procalcitonin   21.58  Comment:  A concentration < 0.25 ng/mL represents a low risk bacterial   infection.  Procalcitonin may not be accurate among patients with localized   infection, recent trauma or major surgery, immunosuppressed state,   invasive fungal infection, renal dysfunction. Decisions regarding   initiation or continuation of antibiotic therapy should not be based   solely on procalcitonin levels.         Allens Test Pass         Anion Gap     8     Aniso   Slight       Baso #   0.05       Basophil%   0.4       Site RR         BUN, Bld     51     Calcium     8.8     Chloride     110     CO2     21     Creatinine     1.3     DelSys Adult Vent         Differential Method   Automated       eGFR if      57     eGFR if non      49  Comment:  Calculation used to obtain the estimated glomerular filtration  rate (eGFR) is the CKD-EPI equation.        Eos #   0.4       Eosinophil%   2.9       FiO2 45         Glucose     93     Gran # (ANC)   10.8       Gran%   86.0       Hematocrit   38.1       Hemoglobin   12.1       Immature Grans (Abs)   0.07  Comment:  Mild elevation in immature granulocytes is non specific and   can be seen in a variety of conditions including stress  response,   acute inflammation, trauma and pregnancy. Correlation with other   laboratory and clinical findings is essential.         Immature Granulocytes   0.6       Coumadin Monitoring INR   4.7  Comment:  Coumadin Therapy:  2.0 - 3.0 for INR for all indicators except mechanical heart valves  and antiphospholipid syndromes which should use 2.5 - 3.5.         Lymph #   0.4       Lymph%   2.9       Magnesium     1.8     MCH   33.2       MCHC   31.8       MCV   105       Mode AC/PRVC         Mono #   0.9       Mono%   7.2       MPV   10.1       nRBC   0       PEEP 5         Platelet Estimate   Appears normal       Platelets   110       POC BE -5         POC HCO3 22.3         POC PCO2 50.3         POC PH 7.255         POC PO2 103         POC SATURATED O2 97         Poik   Slight       Potassium     4.6     Protime   47.8       Rate 18         RBC   3.64       RDW   14.2       Sample ARTERIAL         Sodium     139     Tear Drop Cells   Occasional       Vt 500         WBC   12.57             Significant Imaging: I have reviewed all pertinent imaging results/findings within the past 24 hours.      Assessment/Plan:      * Acute respiratory failure with hypoxia and hypercapnia on mechanical ventilation  10/29:  Likely 2/2 to aspiration PNA  Sputum culture pending  Blood culture pending  On vanc, cefepime, and flagyl  Continue to wean vent as tolerated   Reviewed blood gas     10/30:  Currently on vent   Sputum cultures pending  Continue current antibiotics   Wean vent as tolerated   Reviewed blood gas today  Will order procal for trend in am           Coumadin toxicity  10/31:  INR elevated  Pharm to dose  Hold dose for today       Parkinson disease  10/29:  Resume home meds      On mechanically assisted ventilation  10/31:  Extubated  Cont to monitor       Sepsis        Aspiration pneumonia of right lower lobe due to gastric secretions  10/31:  Continue vanc, cefepime, and flagyl  procalcitonin trending up  WBC within  normal limits  Chest xray ordered  Continue to monitor for response  Repeat procalcitonin in am  Sputum culture normal mikel      Metabolic acidosis with respiratory acidosis        Atrial fibrillation with RVR  10/29:  Not on rate control medical medications at home  HR ok for now  On coumadin   Pharm to dose   Metoprolol IV PRN     10/30:  Rate controlled     10/31:  Controlled     SOB (shortness of breath)          VTE Risk Mitigation (From admission, onward)         Ordered     warfarin ([facility] ONLY) (COUMADIN) tablet 5 mg  Daily      10/31/19 0942     Place sequential compression device  Until discontinued      10/29/19 1630     IP VTE HIGH RISK PATIENT  Once      10/29/19 1630                Critical care time spent on the evaluation and treatment of severe organ dysfunction, review of pertinent labs and imaging studies, discussions with consulting providers and discussions with patient/family: 36 minutes.      Allan Guzman MD  Department of Hospital Medicine   [facility]

## 2019-10-31 NOTE — H&P
[facility] Hospital Medicine  History & Physical    Patient Name: Shaq Huffman  MRN: [id number]  Admission Date: 10/29/2019  Attending Physician: Allan Guzman MD  Primary Care Provider: Provider [employer]         Patient information was obtained from spouse/SO and ER records.     Subjective:     Principal Problem:Acute respiratory failure with hypoxia and hypercapnia    Chief Complaint:   Chief Complaint   Patient presents with    Shortness of Breath     Hx COPD. SOB started yesterday        HPI: Patient is an 87 y/o male with a PMH of parkinsons, afib, GERD, COPD presents as transfer from [facility] for respiratory failure. Patient was intubated prior to arrival and history was obtained by partner. She reports her and patient were on a cruise leaving out of [place] and patient developed some breathing difficulty. Cruise healthcare professionals were called patient was taken to [facility]. He was intubated there due to respiratory insufficiency with hypercarbia and transferred to [facility]. She denied any sick contacts or recent hospitalizations.     Past Medical History:   Diagnosis Date    A-fib     COPD (chronic obstructive pulmonary disease)        History reviewed. No pertinent surgical history.    Review of patient's allergies indicates:  No Known Allergies    No current facility-administered medications on file prior to encounter.      Current Outpatient Medications on File Prior to Encounter   Medication Sig    albuterol (PROVENTIL) 2.5 mg /3 mL (0.083 %) nebulizer solution Take 2.5 mg by nebulization every 6 (six) hours as needed for Wheezing. Rescue    brimonidine 0.15 % OPTH DROP (ALPHAGAN) 0.15 % ophthalmic solution 1 drop 3 (three) times daily.    brinzolamide/brimonidine tart (SIMBRINZA OPHT) Apply to eye.    carbidopa-levodopa  mg (SINEMET)  mg per tablet     fluticasone propionate (FLOVENT HFA) 110 mcg/actuation inhaler Inhale 1 puff into the  lungs 2 (two) times daily. Controller    furosemide (LASIX) 20 MG tablet     lisinopril (PRINIVIL,ZESTRIL) 20 MG tablet     vit C/vit E ac/lut/copper/zinc (PRESERVISION LUTEIN ORAL) Take by mouth.    vitamin D (VITAMIN D3) 1000 units Tab Take 1,000 Units by mouth once daily.    warfarin (COUMADIN) 5 MG tablet     bevacizumab (AVASTIN) 2.5 mg/0.10 mL injection Inject into the eye.     Family History     None        Tobacco Use    Smoking status: Not on file   Substance and Sexual Activity    Alcohol use: Not on file    Drug use: Not on file    Sexual activity: Not on file     Review of Systems   Unable to perform ROS: Intubated     Objective:     Vital Signs (Most Recent):  Temp: 99.1 °F (37.3 °C) (10/31/19 0705)  Pulse: 89 (10/31/19 0705)  Resp: 18 (10/31/19 0705)  BP: (!) 119/56 (10/31/19 0705)  SpO2: 97 % (10/31/19 0705) Vital Signs (24h Range):  Temp:  [98.4 °F (36.9 °C)-99.1 °F (37.3 °C)] 99.1 °F (37.3 °C)  Pulse:  [] 89  Resp:  [14-22] 18  SpO2:  [91 %-100 %] 97 %  BP: ()/() 119/56     Weight: 77.9 kg (171 lb 11.8 oz)  Body mass index is 21.47 kg/m².    Physical Exam   Constitutional: He appears well-developed and well-nourished. No distress.   intubated   HENT:   Head: Normocephalic and atraumatic.   Cardiovascular: Exam reveals no gallop and no friction rub.   No murmur heard.  Irregular irregular   Pulmonary/Chest: Effort normal. No stridor. No respiratory distress. He has no wheezes. He has rales (RLL).   Abdominal: Soft. Bowel sounds are normal. He exhibits no distension and no mass. There is no tenderness. There is no guarding.   Musculoskeletal: He exhibits no edema.   Neurological:   sedated   Skin: Skin is warm. No rash noted. He is not diaphoretic.           Significant Labs: All pertinent labs within the past 24 hours have been reviewed.    Significant Imaging: I have reviewed all pertinent imaging results/findings within the past 24 hours.    Assessment/Plan:     * Acute  respiratory failure with hypoxia and hypercapnia on mechanical ventilation  10/29:  Likely 2/2 to aspiration PNA  Sputum culture pending  Blood culture pending  On vanc, cefepime, and flagyl  Continue to wean vent as tolerated   Reviewed blood gas     10/30:  Currently on vent   Sputum cultures pending  Continue current antibiotics   Wean vent as tolerated   Reviewed blood gas today  Will order procal for trend in am           Parkinson disease  10/29:  Resume home meds      Sepsis        Aspiration pneumonia of right lower lobe due to gastric secretions        Metabolic acidosis with respiratory acidosis        Atrial fibrillation with RVR  10/29:  Not on rate control medical medications at home  HR ok for now  On coumadin   Pharm to dose   Metoprolol IV PRN     10/30:  Rate controlled     SOB (shortness of breath)          VTE Risk Mitigation (From admission, onward)         Ordered     Place sequential compression device  Until discontinued      10/29/19 1630     IP VTE HIGH RISK PATIENT  Once      10/29/19 1630              Critical care time spent on the evaluation and treatment of severe organ dysfunction, review of pertinent labs and imaging studies, discussions with consulting providers and discussions with patient/family: 35 minutes.     Allan Guzman MD  Department of Hospital Medicine   [facility]

## 2019-10-31 NOTE — SUBJECTIVE & OBJECTIVE
Past Medical History:   Diagnosis Date    A-fib     COPD (chronic obstructive pulmonary disease)        History reviewed. No pertinent surgical history.    Review of patient's allergies indicates:  No Known Allergies    No current facility-administered medications on file prior to encounter.      Current Outpatient Medications on File Prior to Encounter   Medication Sig    albuterol (PROVENTIL) 2.5 mg /3 mL (0.083 %) nebulizer solution Take 2.5 mg by nebulization every 6 (six) hours as needed for Wheezing. Rescue    brimonidine 0.15 % OPTH DROP (ALPHAGAN) 0.15 % ophthalmic solution 1 drop 3 (three) times daily.    brinzolamide/brimonidine tart (SIMBRINZA OPHT) Apply to eye.    carbidopa-levodopa  mg (SINEMET)  mg per tablet     fluticasone propionate (FLOVENT HFA) 110 mcg/actuation inhaler Inhale 1 puff into the lungs 2 (two) times daily. Controller    furosemide (LASIX) 20 MG tablet     lisinopril (PRINIVIL,ZESTRIL) 20 MG tablet     vit C/vit E ac/lut/copper/zinc (PRESERVISION LUTEIN ORAL) Take by mouth.    vitamin D (VITAMIN D3) 1000 units Tab Take 1,000 Units by mouth once daily.    warfarin (COUMADIN) 5 MG tablet     bevacizumab (AVASTIN) 2.5 mg/0.10 mL injection Inject into the eye.     Family History     None        Tobacco Use    Smoking status: Not on file   Substance and Sexual Activity    Alcohol use: Not on file    Drug use: Not on file    Sexual activity: Not on file     Review of Systems   Unable to perform ROS: Intubated     Objective:     Vital Signs (Most Recent):  Temp: 99.1 °F (37.3 °C) (10/31/19 0705)  Pulse: 89 (10/31/19 0705)  Resp: 18 (10/31/19 0705)  BP: (!) 119/56 (10/31/19 0705)  SpO2: 97 % (10/31/19 0705) Vital Signs (24h Range):  Temp:  [98.4 °F (36.9 °C)-99.1 °F (37.3 °C)] 99.1 °F (37.3 °C)  Pulse:  [] 89  Resp:  [14-22] 18  SpO2:  [91 %-100 %] 97 %  BP: ()/() 119/56     Weight: 77.9 kg (171 lb 11.8 oz)  Body mass index is 21.47  kg/m².    Physical Exam   Constitutional: He appears well-developed and well-nourished. No distress.   intubated   HENT:   Head: Normocephalic and atraumatic.   Cardiovascular: Exam reveals no gallop and no friction rub.   No murmur heard.  Irregular irregular   Pulmonary/Chest: Effort normal. No stridor. No respiratory distress. He has no wheezes. He has rales (RLL).   Abdominal: Soft. Bowel sounds are normal. He exhibits no distension and no mass. There is no tenderness. There is no guarding.   Musculoskeletal: He exhibits no edema.   Neurological:   sedated   Skin: Skin is warm. No rash noted. He is not diaphoretic.           Significant Labs: All pertinent labs within the past 24 hours have been reviewed.    Significant Imaging: I have reviewed all pertinent imaging results/findings within the past 24 hours.

## 2019-10-31 NOTE — PROGRESS NOTES
Weak cough , think brown secretions . Placing nasal trumpet  To be able to NT suction per order of NP maritza harris

## 2019-10-31 NOTE — ASSESSMENT & PLAN NOTE
Tolerated SAT/SBT and extubated this AM  Close ICU monitoring post extubation  Cont NC O2 and cont pulse ox monitoring

## 2019-10-31 NOTE — PROGRESS NOTES
Pharmacokinetic Assessment Sign Off: IV Vancomycin    Therapy with Vancomycin complete and/or consult discontinued by provider.  Pharmacy will sign off, please re-consult as needed.     Thank you for allowing us to participate in this patient's care.     Vivian Richards PharmD 10/31/2019 9:40 AM

## 2019-10-31 NOTE — ASSESSMENT & PLAN NOTE
10/29:  Not on rate control medical medications at home  HR ok for now  On coumadin   Pharm to dose   Metoprolol IV PRN     10/30:  Rate controlled     10/31:  Controlled

## 2019-10-31 NOTE — SUBJECTIVE & OBJECTIVE
Interval History: Patient was extubated this am. Currently tolerating nasal canula. Sleeping at the moment but arousable.     Review of Systems   Unable to perform ROS: Mental status change     Objective:     Vital Signs (Most Recent):  Temp: 98.8 °F (37.1 °C) (10/31/19 1505)  Pulse: 104 (10/31/19 1541)  Resp: (!) 24 (10/31/19 1541)  BP: 110/61 (10/31/19 1500)  SpO2: 95 % (10/31/19 1541) Vital Signs (24h Range):  Temp:  [98.4 °F (36.9 °C)-99.1 °F (37.3 °C)] 98.8 °F (37.1 °C)  Pulse:  [] 104  Resp:  [14-26] 24  SpO2:  [90 %-99 %] 95 %  BP: ()/(46-78) 110/61     Weight: 77.9 kg (171 lb 11.8 oz)  Body mass index is 21.47 kg/m².    Intake/Output Summary (Last 24 hours) at 10/31/2019 1629  Last data filed at 10/31/2019 1300  Gross per 24 hour   Intake 3184.11 ml   Output 925 ml   Net 2259.11 ml      Physical Exam   Constitutional: He appears well-developed and well-nourished. No distress.   Lethargic    HENT:   Head: Normocephalic and atraumatic.   Cardiovascular: Exam reveals no gallop and no friction rub.   No murmur heard.  Irregular irregular   Pulmonary/Chest: Effort normal. No stridor. No respiratory distress. He has no wheezes. He has rales (RLL).   Abdominal: Soft. Bowel sounds are normal. He exhibits no distension and no mass. There is no tenderness. There is no guarding.   Musculoskeletal: He exhibits no edema.   Neurological:   sedated   Skin: Skin is warm. No rash noted. He is not diaphoretic.       Significant Labs:   Recent Lab Results       10/31/19  0541   10/31/19  0345   10/31/19  0344        Procalcitonin   21.58  Comment:  A concentration < 0.25 ng/mL represents a low risk bacterial   infection.  Procalcitonin may not be accurate among patients with localized   infection, recent trauma or major surgery, immunosuppressed state,   invasive fungal infection, renal dysfunction. Decisions regarding   initiation or continuation of antibiotic therapy should not be based   solely on procalcitonin  levels.         Allens Test Pass         Anion Gap     8     Aniso   Slight       Baso #   0.05       Basophil%   0.4       Site RR         BUN, Bld     51     Calcium     8.8     Chloride     110     CO2     21     Creatinine     1.3     DelSys Adult Vent         Differential Method   Automated       eGFR if      57     eGFR if non      49  Comment:  Calculation used to obtain the estimated glomerular filtration  rate (eGFR) is the CKD-EPI equation.        Eos #   0.4       Eosinophil%   2.9       FiO2 45         Glucose     93     Gran # (ANC)   10.8       Gran%   86.0       Hematocrit   38.1       Hemoglobin   12.1       Immature Grans (Abs)   0.07  Comment:  Mild elevation in immature granulocytes is non specific and   can be seen in a variety of conditions including stress response,   acute inflammation, trauma and pregnancy. Correlation with other   laboratory and clinical findings is essential.         Immature Granulocytes   0.6       Coumadin Monitoring INR   4.7  Comment:  Coumadin Therapy:  2.0 - 3.0 for INR for all indicators except mechanical heart valves  and antiphospholipid syndromes which should use 2.5 - 3.5.         Lymph #   0.4       Lymph%   2.9       Magnesium     1.8     MCH   33.2       MCHC   31.8       MCV   105       Mode AC/PRVC         Mono #   0.9       Mono%   7.2       MPV   10.1       nRBC   0       PEEP 5         Platelet Estimate   Appears normal       Platelets   110       POC BE -5         POC HCO3 22.3         POC PCO2 50.3         POC PH 7.255         POC PO2 103         POC SATURATED O2 97         Poik   Slight       Potassium     4.6     Protime   47.8       Rate 18         RBC   3.64       RDW   14.2       Sample ARTERIAL         Sodium     139     Tear Drop Cells   Occasional       Vt 500         WBC   12.57             Significant Imaging: I have reviewed all pertinent imaging results/findings within the past 24 hours.

## 2019-10-31 NOTE — CONSULTS
"Screening performed on this 86 year old male patient for increased risk of skin breakdown. He has a PMHx of COPD, parkinsons, afib, GERD. Inpatient with acute respiratory failure. He is sedated, just extubated. RT at bedside who reports redness to his cheeks. Redness is blanchable, appears to be chronic. Patient has areas of widespread, scattered ecchymosis. Bilateral heels, sacrum, buttocks & coccyx intact with no redness noted. NG tube site intact with no redness to nare. Recommend continued pressure injury prevention measures.     Skin Care Precautions / Pressure Injury Prevention:  1. Follow "Guidelines for Prevention of Pressure Ulcers in At Risk Patients"  These guidelines can be found on the [facility] by searching "Wound Care / Ostomy Resources"  2. Document wound assessment in [facility] using guidelines in Estelita's "Assessment : Wound" procedure  3. Limit the amount of linen/underpad between patient and mattress surface to ONE fitted sheet and ONE covidien underpad - NO DIAPERS  4. Obtain Bath Wipes for providing noah care - avoid the use of wash cloths to areas affected by IAD.  5. Apply Moisture Barrier Paste to perineal / perirectal areas in a thin even layer to clean dry skin BID and after each episode of pericare  6. Apply sween 24 moisturizer cream to all dry skin after daily bath and prn  7. Obtain foam wedge from materials management to assist with maintaining proper position changes at least q 2hours and document actual position in EPIC q 2hours  8. Elevate heels off mattress on 2 separate pillows placed lengthwise under each leg supporting the leg from knee to ankle.  Document in EPIC flow sheet every 2 hours.  9. .Do NOT elevate HOB greater than 30 degrees unless contraindicated.  10. Remove SCD/Plexi Pulses/JUSTO's every 12 hours for 30 minutes and assess skin underneath these devices for breakdown        "

## 2019-11-01 PROBLEM — Z99.11 ON MECHANICALLY ASSISTED VENTILATION: Status: RESOLVED | Noted: 2019-10-29 | Resolved: 2019-11-01

## 2019-11-01 PROBLEM — R53.81 PHYSICAL DECONDITIONING: Status: ACTIVE | Noted: 2019-11-01

## 2019-11-01 PROBLEM — I50.9 NEW ONSET OF CONGESTIVE HEART FAILURE: Status: ACTIVE | Noted: 2019-11-01

## 2019-11-01 LAB
ANION GAP SERPL CALC-SCNC: 8 MMOL/L (ref 8–16)
BACTERIA SPEC AEROBE CULT: NORMAL
BACTERIA SPEC AEROBE CULT: NORMAL
BASOPHILS # BLD AUTO: 0.02 K/UL (ref 0–0.2)
BASOPHILS NFR BLD: 0.2 % (ref 0–1.9)
BUN SERPL-MCNC: 53 MG/DL (ref 8–23)
CALCIUM SERPL-MCNC: 9.1 MG/DL (ref 8.7–10.5)
CHLORIDE SERPL-SCNC: 112 MMOL/L (ref 95–110)
CO2 SERPL-SCNC: 21 MMOL/L (ref 23–29)
CREAT SERPL-MCNC: 1.2 MG/DL (ref 0.5–1.4)
DIFFERENTIAL METHOD: ABNORMAL
EOSINOPHIL # BLD AUTO: 0.2 K/UL (ref 0–0.5)
EOSINOPHIL NFR BLD: 1.5 % (ref 0–8)
ERYTHROCYTE [DISTWIDTH] IN BLOOD BY AUTOMATED COUNT: 13.9 % (ref 11.5–14.5)
EST. GFR  (AFRICAN AMERICAN): >60 ML/MIN/1.73 M^2
EST. GFR  (NON AFRICAN AMERICAN): 54 ML/MIN/1.73 M^2
GLUCOSE SERPL-MCNC: 128 MG/DL (ref 70–110)
GRAM STN SPEC: NORMAL
GRAM STN SPEC: NORMAL
HCT VFR BLD AUTO: 34.5 % (ref 40–54)
HGB BLD-MCNC: 11.4 G/DL (ref 14–18)
IMM GRANULOCYTES # BLD AUTO: 0.07 K/UL (ref 0–0.04)
IMM GRANULOCYTES NFR BLD AUTO: 0.6 % (ref 0–0.5)
INR PPP: 4.2 (ref 0.8–1.2)
LYMPHOCYTES # BLD AUTO: 0.2 K/UL (ref 1–4.8)
LYMPHOCYTES NFR BLD: 2.1 % (ref 18–48)
MAGNESIUM SERPL-MCNC: 2 MG/DL (ref 1.6–2.6)
MCH RBC QN AUTO: 32.8 PG (ref 27–31)
MCHC RBC AUTO-ENTMCNC: 33 G/DL (ref 32–36)
MCV RBC AUTO: 99 FL (ref 82–98)
MONOCYTES # BLD AUTO: 0.7 K/UL (ref 0.3–1)
MONOCYTES NFR BLD: 5.7 % (ref 4–15)
NEUTROPHILS # BLD AUTO: 10.2 K/UL (ref 1.8–7.7)
NEUTROPHILS NFR BLD: 89.9 % (ref 38–73)
NRBC BLD-RTO: 0 /100 WBC
PLATELET # BLD AUTO: 111 K/UL (ref 150–350)
PMV BLD AUTO: 10.3 FL (ref 9.2–12.9)
POTASSIUM SERPL-SCNC: 3.9 MMOL/L (ref 3.5–5.1)
PROCALCITONIN SERPL IA-MCNC: 13.06 NG/ML
PROTHROMBIN TIME: 43.4 SEC (ref 9–12.5)
RBC # BLD AUTO: 3.48 M/UL (ref 4.6–6.2)
SODIUM SERPL-SCNC: 141 MMOL/L (ref 136–145)
WBC # BLD AUTO: 11.37 K/UL (ref 3.9–12.7)

## 2019-11-01 PROCEDURE — 25000242 PHARM REV CODE 250 ALT 637 W/ HCPCS: Performed by: FAMILY MEDICINE

## 2019-11-01 PROCEDURE — 36415 COLL VENOUS BLD VENIPUNCTURE: CPT

## 2019-11-01 PROCEDURE — 25000242 PHARM REV CODE 250 ALT 637 W/ HCPCS: Performed by: NURSE PRACTITIONER

## 2019-11-01 PROCEDURE — S0030 INJECTION, METRONIDAZOLE: HCPCS | Performed by: NURSE PRACTITIONER

## 2019-11-01 PROCEDURE — 99291 CRITICAL CARE FIRST HOUR: CPT | Mod: ,,, | Performed by: NURSE PRACTITIONER

## 2019-11-01 PROCEDURE — 25000003 PHARM REV CODE 250: Performed by: NURSE PRACTITIONER

## 2019-11-01 PROCEDURE — 83735 ASSAY OF MAGNESIUM: CPT

## 2019-11-01 PROCEDURE — A4216 STERILE WATER/SALINE, 10 ML: HCPCS | Performed by: NURSE PRACTITIONER

## 2019-11-01 PROCEDURE — 84145 PROCALCITONIN (PCT): CPT

## 2019-11-01 PROCEDURE — 97803 MED NUTRITION INDIV SUBSEQ: CPT

## 2019-11-01 PROCEDURE — 99900035 HC TECH TIME PER 15 MIN (STAT)

## 2019-11-01 PROCEDURE — A4216 STERILE WATER/SALINE, 10 ML: HCPCS | Performed by: INTERNAL MEDICINE

## 2019-11-01 PROCEDURE — 21400001 HC TELEMETRY ROOM

## 2019-11-01 PROCEDURE — 80048 BASIC METABOLIC PNL TOTAL CA: CPT

## 2019-11-01 PROCEDURE — 99900038 HC OT GENERIC THERAPY SCREENING (STAT)

## 2019-11-01 PROCEDURE — 27000221 HC OXYGEN, UP TO 24 HOURS

## 2019-11-01 PROCEDURE — 92610 EVALUATE SWALLOWING FUNCTION: CPT

## 2019-11-01 PROCEDURE — 85610 PROTHROMBIN TIME: CPT

## 2019-11-01 PROCEDURE — 85025 COMPLETE CBC W/AUTO DIFF WBC: CPT

## 2019-11-01 PROCEDURE — S0030 INJECTION, METRONIDAZOLE: HCPCS | Performed by: FAMILY MEDICINE

## 2019-11-01 PROCEDURE — 25000003 PHARM REV CODE 250: Performed by: FAMILY MEDICINE

## 2019-11-01 PROCEDURE — 25000003 PHARM REV CODE 250: Performed by: INTERNAL MEDICINE

## 2019-11-01 PROCEDURE — S0028 INJECTION, FAMOTIDINE, 20 MG: HCPCS | Performed by: FAMILY MEDICINE

## 2019-11-01 PROCEDURE — 94640 AIRWAY INHALATION TREATMENT: CPT

## 2019-11-01 PROCEDURE — 99291 PR CRITICAL CARE, E/M 30-74 MINUTES: ICD-10-PCS | Mod: ,,, | Performed by: NURSE PRACTITIONER

## 2019-11-01 PROCEDURE — 63600175 PHARM REV CODE 636 W HCPCS: Performed by: FAMILY MEDICINE

## 2019-11-01 RX ORDER — IPRATROPIUM BROMIDE AND ALBUTEROL SULFATE 2.5; .5 MG/3ML; MG/3ML
3 SOLUTION RESPIRATORY (INHALATION)
Status: DISCONTINUED | OUTPATIENT
Start: 2019-11-01 | End: 2019-11-07 | Stop reason: HOSPADM

## 2019-11-01 RX ORDER — FAMOTIDINE 20 MG/1
20 TABLET, FILM COATED ORAL DAILY
Status: DISCONTINUED | OUTPATIENT
Start: 2019-11-02 | End: 2019-11-03

## 2019-11-01 RX ADMIN — Medication 10 ML: at 11:11

## 2019-11-01 RX ADMIN — Medication 10 ML: at 05:11

## 2019-11-01 RX ADMIN — IPRATROPIUM BROMIDE AND ALBUTEROL SULFATE 3 ML: .5; 3 SOLUTION RESPIRATORY (INHALATION) at 08:11

## 2019-11-01 RX ADMIN — METRONIDAZOLE 500 MG: 500 INJECTION, SOLUTION INTRAVENOUS at 05:11

## 2019-11-01 RX ADMIN — FAMOTIDINE 20 MG: 10 INJECTION INTRAVENOUS at 08:11

## 2019-11-01 RX ADMIN — CEFEPIME HYDROCHLORIDE 2 G: 2 INJECTION, SOLUTION INTRAVENOUS at 02:11

## 2019-11-01 RX ADMIN — BUDESONIDE 0.5 MG: 0.5 INHALANT RESPIRATORY (INHALATION) at 08:11

## 2019-11-01 RX ADMIN — Medication 10 ML: at 01:11

## 2019-11-01 RX ADMIN — ARFORMOTEROL TARTRATE 15 MCG: 15 SOLUTION RESPIRATORY (INHALATION) at 08:11

## 2019-11-01 RX ADMIN — CARBIDOPA AND LEVODOPA 1 TABLET: 25; 100 TABLET ORAL at 08:11

## 2019-11-01 RX ADMIN — METRONIDAZOLE 500 MG: 500 INJECTION, SOLUTION INTRAVENOUS at 02:11

## 2019-11-01 RX ADMIN — IPRATROPIUM BROMIDE AND ALBUTEROL SULFATE 3 ML: .5; 3 SOLUTION RESPIRATORY (INHALATION) at 04:11

## 2019-11-01 RX ADMIN — METOPROLOL TARTRATE 5 MG: 5 INJECTION INTRAVENOUS at 05:11

## 2019-11-01 RX ADMIN — Medication 10 ML: at 12:11

## 2019-11-01 RX ADMIN — CEFEPIME HYDROCHLORIDE 2 G: 2 INJECTION, SOLUTION INTRAVENOUS at 03:11

## 2019-11-01 RX ADMIN — IPRATROPIUM BROMIDE AND ALBUTEROL SULFATE 3 ML: .5; 3 SOLUTION RESPIRATORY (INHALATION) at 02:11

## 2019-11-01 RX ADMIN — CARBIDOPA AND LEVODOPA 1 TABLET: 25; 100 TABLET ORAL at 02:11

## 2019-11-01 RX ADMIN — MELATONIN 1000 UNITS: at 08:11

## 2019-11-01 RX ADMIN — METRONIDAZOLE 500 MG: 500 INJECTION, SOLUTION INTRAVENOUS at 10:11

## 2019-11-01 RX ADMIN — POLYETHYLENE GLYCOL (3350) 17 G: 17 POWDER, FOR SOLUTION ORAL at 08:11

## 2019-11-01 RX ADMIN — IPRATROPIUM BROMIDE AND ALBUTEROL SULFATE 3 ML: .5; 3 SOLUTION RESPIRATORY (INHALATION) at 12:11

## 2019-11-01 NOTE — PLAN OF CARE
ST assessed swallow with ice chips at bedside.  He tolerated oral stim via a toothette for moisture and to assess for a gag.  No gag was elicited.  His swallow was characterized by a delayed in onset, decreased laryngeal elevation to palpation, and delayed throat clearing and very weak coughing.  Pt is not a candidate for po intake at this time.  ST will reassess swallow as pt tolerates.

## 2019-11-01 NOTE — NURSING
Pt arrived to unit from ICU in no acute distress. VSS. Supplemental oxygen @ 4 LPM via NC in place. Tele monitor #[id number] applied to pt and verified with monitor tech.  IV infusion of Flagyl infusing to PIV. Bed low, side rails up x2, call light in reach, non-slip footwear socks in place, bed alarm armed. Family present at bedside. Pt still sedated.   Educated pt on importance of collecting accurate I&Os. Urinal at bedside within reach/urine hat placed in toilet. Communication board updated with current plan of care. Pt has no complaints at this time, instructed to call for assistance.

## 2019-11-01 NOTE — NURSING
Report called to WILL Alvarez. Pt resting comfortably and will be transferred shortly. Will continue to monitor.    Jarrod Vidal RN  11/01/2019  [id number]

## 2019-11-01 NOTE — PROGRESS NOTES
"  [facility] -   Adult Nutrition  Progress Note    SUMMARY     Recommendations    Recommendation: 1. Await ST eval/recs. 2. If unable to initiate PO diet, continue enteral nutrition & change TF regimen to Isosource 1.5 @ 55 ml/hr+ 1 pack of beneprotein (2005 kcal, 95 g protein & 1008 ml free water). Flushes per MD/NP. 3. Will continue to monitor.   Intervention: Collaboration with other providers   Goals: Meet > 85 % EEN/EPN by RD f/u   Nutrition Goal Status: new  Communication of RD Recs: (POC, sticky note, reviewed with NP)    Reason for Assessment    Reason For Assessment: RD f/u   Diagnosis: (Acute respiratory failure)  Relevant Medical History:  AF, COPD  Interdisciplinary Rounds: attended  General Information Comments:   11/1/19. RD consulted x TF recs. Pt admitted x respiratory failure. Pt currently in ICU, intubated and sedated w/ propofol. Pt's wife reports no wt loss in the last year. Per pt's wife, pt w/ good appetite and intake PTA. NFPE not performed d/t pt w/ no signs of malnutrition. Reviewed TF recs w/ NP this am.   11/1/19. Now extubated. Pt on NC.  Tolerating TF @ 40 ml/hr . Very drowsy. Per ICU rounds, plan to continue TF today until ST eval. Reviewed nutrition recs w/ NP this am.   Nutrition Discharge Planning: pending medical course    Nutrition Risk Screen    Nutrition Risk Screen: no indicators present    Nutrition/Diet History    Spiritual, Cultural Beliefs, Tenriism Practices, Values that Affect Care: no     Anthropometrics    Temp: 98.7 °F (37.1 °C)  Height Method: Stated  Height: 6' 3" (190.5 cm)  Height (inches): 75 in  Weight Method: Bed Scale  Weight: 77.9 kg (171 lb 11.8 oz)  Weight (lb): 171.74 lb  Ideal Body Weight (IBW), Male: 196 lb  % Ideal Body Weight, Male (lb): 87.62 lb  BMI (Calculated): 21.5  BMI Grade: 18.5-24.9 - normal       Lab/Procedures/Meds    Pertinent Labs Reviewed: reviewed  BMP  Lab Results   Component Value Date     11/01/2019    K 3.9 " 11/01/2019     (H) 11/01/2019    CO2 21 (L) 11/01/2019    BUN 53 (H) 11/01/2019    CREATININE 1.2 11/01/2019    CALCIUM 9.1 11/01/2019    ANIONGAP 8 11/01/2019    ESTGFRAFRICA >60 11/01/2019    EGFRNONAA 54 (A) 11/01/2019     Lab Results   Component Value Date    CALCIUM 9.1 11/01/2019     Lab Results   Component Value Date    ALBUMIN 4.1 10/29/2019     No results for input(s): POCTGLUCOSE in the last 24 hours.    Pertinent Medications Reviewed: reviewed      Estimated/Assessed Needs    Weight Used For Calorie Calculations: 77.9 kg (171 lb 11.8 oz)  Energy Calorie Requirements (kcal): 1852 - 2007  Energy Need Method: [place] St. Jeor x 1.2- 1.3   Protein Requirements: 93- 155 g  Weight Used For Protein Calculations: 77.9 kg (171 lb 11.8 oz)     Estimated Fluid Requirement Method: RDA Method(or per MD)  RDA Method (mL): 1852         Nutrition Prescription Ordered    Current Diet Order: NPO   Current nutrition support formula ordered: Peptamen intense VHP @ 60 ml/hr (running @ 40 ml/hr)    Evaluation of Received Nutrient/Fluid Intake    Enteral calories: 960 kcal   Enteral protein: 89 g   % kcal needs: 51  % protein needs: 96    Intake/Output Summary (Last 24 hours) at 11/1/2019 1030  Last data filed at 11/1/2019 0600  Gross per 24 hour   Intake 1344.17 ml   Output 1095 ml   Net 249.17 ml     % Intake of Estimated Energy Needs: 50 - 75 %  % Meal Intake: NPO    Nutrition Risk      2xweekly    Assessment and Plan    Nutrition Problem  Inadequate energy intake     Related to (etiology):   NPO status, no alternative means of nutrition     Signs and Symptoms (as evidenced by):   Meeting < 85 % EEN/EPN    Interventions:  Collaboration with other providers     Nutrition Diagnosis Status:   Improving. 11/1/19, Now on TF.         Monitor and Evaluation    Food and Nutrient Intake: energy intake, enteral nutrition intake  Food and Nutrient Adminstration:diet order, enteral and parenteral nutrition  administration  Anthropometric Measurements: weight  Biochemical Data, Medical Tests and Procedures: electrolyte and renal panel, glucose/endocrine profile  Nutrition-Focused Physical Findings: overall appearance       Nutrition Follow-Up    RD Follow-up?: Yes

## 2019-11-01 NOTE — PROGRESS NOTES
Clinical Pharmacy Progress Note: Coumadin Dosing and Monitoring     Goal INR: 2-3   Indication: Atrial fibrillation with RVR  Lab Results   Component Value Date    INR 4.2 (H) 11/01/2019    INR 4.7 (H) 10/31/2019    INR 3.2 (H) 10/30/2019     Patient has not yet been educated by pharmacist this admission. Will attempt counseling today.    Recent dosing history: Warfarin 5 mg on 10/29/19, Warfarin 2.5 mg (reduced dose) on 10/30/19. Held warfarin on 10/31/19.  Home dosing regimen: Warfarin 5 mg PO daily.  Drug interactions/Factors that may affect INR:  - Patient is currently on tube feedings, which may help reduce the INR  - Patient is on antibiotics: metronidazole, and cefepime which can increase the INR.  - Patient has infection: sepsis secondary to pneumonia, which can increase the INR    Plan:   - Due to supra-therapeutic INR, HOLD warfarin again today.   - Noted that INR decreased from yesterday, which could possibly be attributed to patient starting tube feeds.   - Pharmacy will continue to monitor daily PT/INR. Dose adjustments will be made accordingly.      Thank you for allowing us to participate in this patient's care.      Vivian Richards, Jaimie 11/01/2019 11:41 AM

## 2019-11-01 NOTE — PLAN OF CARE
Fall precautions maintained. Bed in lowest position with wheels locked. Pt extubated this AM. Vital signs remain stable throughout shift. On 3 L NC at this time. Pt drowsy; easily arousable. Will follow commands. Weak voice. NG tube remains in place with TF @ 40ml/hr. Pt Afib on monitor-rate controlled. Pt position changed Q 2hrs with wedge. Waffle mattress and heel boots in place. Significant other updated on POC. Will continue to monitor.

## 2019-11-01 NOTE — PT/OT/SLP PROGRESS
Occupational Therapy      Patient Name:  Shaq Huffman   MRN:  [id number]  EVAL INITIATED VIA CHART REVIEW. WILL COMPLETE ON LATER DATE  Aylin Vital OT  11/1/2019  [id number]

## 2019-11-01 NOTE — ASSESSMENT & PLAN NOTE
CXR daily   Cont Cefepime and Flagyl  Sputum and Blood cultures NGTD  Encourage C&DB  OOB asap  NPO w/ aspiration precautions  Consult ST for swallow eval  NT suction PRN  Cont Duonebs

## 2019-11-01 NOTE — PLAN OF CARE
Spoke to girl friend.  Discharge planning still to be determine based on patient's hospital progress.  Dr. Guzman spoke to family and encouraged to travel home by plane when discharged.  Will continue to follow.       11/01/19 1532   Discharge Reassessment   Assessment Type Discharge Planning Reassessment   Provided patient/caregiver education on the expected discharge date and the discharge plan Yes   Do you have any problems affording any of your prescribed medications? No   Discharge Plan A Home with family   DME Needed Upon Discharge    (tbd)   Patient choice form signed by patient/caregiver N/A   Anticipated Discharge Disposition Home   Can the patient answer the patient profile reliably? Yes, cognitively intact

## 2019-11-01 NOTE — PT/OT/SLP EVAL
Speech Language Pathology Evaluation  Bedside Swallow    Patient Name:  Shaq Huffman   MRN:  [id number]  Admitting Diagnosis: Aspiration pneumonia of right lower lobe due to gastric secretions    Recommendations:                 General Recommendations:  Dysphagia therapy  Diet recommendations:  NPO, NPO   Aspiration Precautions: Frequent oral care   General Precautions: Standard, aspiration, respiratory      History:     Past Medical History:   Diagnosis Date    A-fib     COPD (chronic obstructive pulmonary disease)        History reviewed. No pertinent surgical history.    Subjective     Pt admitted with respiratory failure following suspected aspiration of gastric content.  Pt's partner at bedside stated that pt had eaten a large meal and began having significant heartburn.  He was intubated 10/29/19 and extubated 10/30/19.  He currently has a NG tube and nasal cannula.  He is having an expiratory wheeze.  He is very weak and sleepy.   Patient goals: none stated     Pain/Comfort:  · Pain Rating 1: 0/10    Objective:     Oral Musculature Evaluation  · Oral Musculature: general weakness  · Dentition: edentulous    Bedside Swallow Eval:   ST assessed swallow with ice chips at bedside.  He tolerated oral stim via a toothette for moisture and to assess for a gag.  No gag was elicited.  His swallow was characterized by a delayed in onset, decreased laryngeal elevation to palpation, and delayed throat clearing and very weak coughing.  Pt is not a candidate for po intake at this time.  ST will reassess swallow as pt tolerates.    Assessment:     Shaq Huffman is a 86 y.o. male with an SLP diagnosis of Dysphagia.  He presents with suspected aspiration and is not safe for PO intake and will require ongoing NG tube feedings.  He will benefit from skilled ST services for dysphagia.      Goals:   Multidisciplinary Problems     SLP Goals        Problem: SLP Goal    Goal Priority Disciplines Outcome   SLP Goal     SLP  Ongoing, Progressing   Description:  1. Pt will complete oral and pharyngeal exercises with min A for increased strength and ROM  2. Pt will tolerate least restrictive diet without incidence and while maintaining airway integrity                     Plan:     · Patient to be seen:  3 x/week   · Plan of Care expires:  11/08/19  · Plan of Care reviewed with:    patient  · SLP Follow-Up:  Yes      Time Tracking:     SLP Treatment Date:   11/01/19  Speech Start Time:  1300  Speech Stop Time:  1315     Speech Total Time (min):  15 min    Billable Minutes: Eval Swallow and Oral Function 15    Barbara Neville CCC-SLP  11/01/2019

## 2019-11-01 NOTE — PROGRESS NOTES
[facility] -   Critical Care Medicine  Progress Note    Patient Name: Shaq Huffman  MRN: [id number]  Admission Date: 10/29/2019  Hospital Length of Stay: 3 days  Code Status: DNR  Attending Provider: Allan Guzman MD  Primary Care Provider: Provider Not[employer]ystem   Principal Problem: Aspiration pneumonia of right lower lobe due to gastric secretions    Subjective:     HPI:  Frail appearing 86 year old male with PMH including COPD, atrial fib on coumadin, parkinson's, and glaucoma presented to ED at [facility] via EMS  Pt traveling via [place] with significant other from [place]  SO reports complaints of reflux, fullness, and discomfort after dinner last evening; then this am woke with increased coughing and shortly became less responsive to conversation when she called for assistance from onboard EMT  ED evaluation revealed atrial fib with RVR 150s, RR 40s on trial BiPap @ 80%  Intubated after failing trial of BiPap; CXR shows RLL infiltrate concerning for aspiration in addition to coarse interstitial markings suspicious for underlying ILD/fibrosis/or scarring  Lab significant for creatinine 1.3; troponin initial 0.029 sienna to 0.159; ; lactic acid initial 2.7 decreased to 2.5; procalcitonin 1.49; initial abg respiratory acidosis with hypoxemia and hypercapnia    Hospital/ICU Course:  Admitted to ICU with OETT on mechanical ventilation with propofol sedation and marginal BP  10/30 - remains sedated and intubated, requiring low dose neosynephrine to support BP  10/31 - Silas SAT/SBT this AM and extubated.  Still weak.  VSS and no distress.  Still on TF and low dose Marco infusion.  Discussed care with spouse and son at bedside  11/1 - Tolerated extubation yesterday.  Much more awake and alert today.  Still quite weak.  Silas TF via NG.  Off vasopressor since yesterday    Review of Systems   Constitutional: Positive for malaise/fatigue. Negative for chills and fever.   HENT: Negative for  congestion.    Eyes: Negative for blurred vision.   Respiratory: Positive for cough. Negative for sputum production and shortness of breath.    Cardiovascular: Negative for chest pain and leg swelling.   Gastrointestinal: Negative for abdominal pain, nausea and vomiting.   Genitourinary: Negative for dysuria.   Musculoskeletal: Negative for myalgias.   Skin: Negative for rash.   Neurological: Negative for dizziness, weakness and headaches.   Endo/Heme/Allergies: Does not bruise/bleed easily.   Psychiatric/Behavioral: The patient is not nervous/anxious.          Objective:     Vital Signs (Most Recent):  Temp: 99.2 °F (37.3 °C) (11/01/19 0705)  Pulse: 102 (11/01/19 1005)  Resp: (!) 24 (11/01/19 1005)  BP: 136/82 (11/01/19 1005)  SpO2: (!) 94 % (11/01/19 1005) Vital Signs (24h Range):  Temp:  [98.4 °F (36.9 °C)-99.2 °F (37.3 °C)] 99.2 °F (37.3 °C)  Pulse:  [] 102  Resp:  [21-35] 24  SpO2:  [90 %-98 %] 94 %  BP: ()/() 136/82     Weight: 77.9 kg (171 lb 11.8 oz)  Body mass index is 21.47 kg/m².      Intake/Output Summary (Last 24 hours) at 11/1/2019 1140  Last data filed at 11/1/2019 1009  Gross per 24 hour   Intake 1630 ml   Output 1295 ml   Net 335 ml       Physical Exam   Constitutional: Vital signs are normal. He appears well-developed. He appears cachectic. He is cooperative. He has a sickly appearance. He appears ill. No distress. Nasal cannula in place.   Frail appearing with barrel chest   HENT:   Head: Atraumatic.   Nose:       Mouth/Throat: Oropharynx is clear and moist.   Mild to mod bilat temporal wasting   Eyes: Pupils are equal, round, and reactive to light. Conjunctivae are normal.   Neck: Neck supple. No JVD present. No tracheal deviation present.   Cardiovascular: Normal rate. An irregularly irregular rhythm present.   Pulses:       Radial pulses are 2+ on the right side, and 2+ on the left side.        Dorsalis pedis pulses are 2+ on the right side, and 2+ on the left side.    Pulmonary/Chest: No accessory muscle usage. No tachypnea. No respiratory distress. He has decreased breath sounds in the right lower field and the left lower field. He has no wheezes. He has no rales.   Abdominal: Soft. He exhibits no distension. Bowel sounds are decreased. There is no tenderness.   Genitourinary: Penis normal.   Genitourinary Comments: Vallejo in place   Musculoskeletal: He exhibits no edema.   Mild general edema   Lymphadenopathy:     He has no cervical adenopathy.   Neurological: He is alert.   Fully awake and alert and verbally responsive.  Mild confusion   Skin: Skin is warm and dry. Capillary refill takes less than 2 seconds. Bruising noted. No cyanosis. Nails show clubbing.        Psychiatric: Judgment and thought content normal. His affect is blunt. His speech is delayed. He is slowed.       Vents:  Vent Mode: A/C (10/31/19 0733)  Ventilator Initiated: Yes (10/29/19 1143)  Set Rate: 18 bmp (10/31/19 0733)  Vt Set: 450 mL (10/31/19 0733)  Pressure Support: 0 cmH20 (10/31/19 0733)  PEEP/CPAP: 5 cmH20 (10/31/19 0733)  Oxygen Concentration (%): 40 (10/31/19 1541)  Peak Airway Pressure: 20 cmH2O (10/31/19 0733)  Plateau Pressure: 15 cmH20 (10/31/19 0733)  Total Ve: 11 mL (10/31/19 0733)  F/VT Ratio<105 (RSBI): (!) 26.16 (10/31/19 0733)    Lines/Drains/Airways     Peripherally Inserted Central Catheter Line                 PICC Double Lumen 10/30/19 0445 left brachial 2 days          Drain                 NG/OG Tube 10/29/19 1200 [place] sump 16 Fr. Left nostril 2 days         Urethral Catheter 10/29/19 1440 16 Fr. 2 days                Significant Labs:    CBC/Anemia Profile:  Recent Labs   Lab 10/31/19  0345 11/01/19  0354   WBC 12.57 11.37   HGB 12.1* 11.4*   HCT 38.1* 34.5*   * 111*   * 99*   RDW 14.2 13.9        Chemistries:  Recent Labs   Lab 10/31/19  0344 11/01/19  0354    141   K 4.6 3.9    112*   CO2 21* 21*   BUN 51* 53*   CREATININE 1.3 1.2   CALCIUM 8.8 9.1    MG 1.8 2.0       Coagulation:   Recent Labs   Lab 11/01/19  0354   INR 4.2*     All pertinent labs within the past 24 hours have been reviewed.    Significant Imaging:  CXR: I have reviewed all pertinent results/findings within the past 24 hours and my personal findings are:  Stable RLL infiltrate      ABG  Recent Labs   Lab 10/31/19  0541   PH 7.255*   PO2 103*   PCO2 50.3*   HCO3 22.3*   BE -5     Assessment/Plan:     Neuro  Parkinson disease  Continue sinemet via NG    Pulmonary  * Aspiration pneumonia of right lower lobe due to gastric secretions  CXR daily   Cont Cefepime and Flagyl  Sputum and Blood cultures NGTD  Encourage C&DB  OOB asap  NPO w/ aspiration precautions  Consult ST for swallow eval  NT suction PRN  Cont Duonebs    Panlobular emphysema  No evidence of acute exacerbation  Cont MISHA, LABA and ICS    Acute respiratory failure with hypoxia  Tolerated extubation over 24 hours  Wean NC O2    Cardiac/Vascular  Atrial fibrillation with RVR  Rate controlled  INR 4.2  Cont cardiac monitoring    Hematology  Coumadin toxicity  Coumadin held  Daily INR  Pharmacy dosing    Other  Physical deconditioning  Consult PT/OT  OOB asap      Preventive Measures and Monitoring:   Stress Ulcer: Pepcid  Nutrition: TF  Glucose control: stable  Bowel prophylaxis: Miralax and PRN Dulcolax  DVT prophylaxis: INR 4.2  Hx CAD on B-Blocker: no hx CAD  Head of Bed/Reposition: Elevate HOB and turn Q1-2 hours   Early Mobility: OOB  NG Day: #4  Central Line LUE PICC Day: #3  Vallejo Day: #4  IVAB Day: #4  Code Status: DNR     Counseling/Consultation:I have discussed the care of this patient in detail with the bedside nursing staff and Dr. Alonso and Dr. Guzman    Clinically improved and tolerated extubation.  Will transfer out of ICU and sign off     Critical Care Time: 48 minutes  Critical secondary to Patient has a condition that poses threat to life and bodily function: Acute Hypoxemic Resp Failure      Critical care was time spent  personally by me on the following activities: development of treatment plan with patient or surrogate and bedside caregivers, discussions with consultants, evaluation of patient's response to treatment, examination of patient, ordering and performing treatments and interventions, ordering and review of laboratory studies, ordering and review of radiographic studies, pulse oximetry, re-evaluation of patient's condition. This critical care time did not overlap with that of any other provider or involve time for any procedures.       Raoul Cobos, NP  Critical Care Medicine  [facility]

## 2019-11-01 NOTE — PROGRESS NOTES
[facility] Hospital Medicine  Progress Note    Patient Name: Shaq Huffman  MRN: [id number]  Patient Class: IP- Inpatient   Admission Date: 10/29/2019  Length of Stay: 3 days  Attending Physician: Allan Guzman MD  Primary Care Provider: Provider Not[employer]ystem        Subjective:     Principal Problem:Aspiration pneumonia of right lower lobe due to gastric secretions        HPI:  Patient is an 85 y/o male with a PMH of parkinsons, afib, GERD, COPD presents as transfer from [facility] for respiratory failure. Patient was intubated prior to arrival and history was obtained by partner. She reports her and patient were on a cruise leaving out of [place] and patient developed some breathing difficulty. Cruise healthcare professionals were called patient was taken to [facility]. He was intubated there due to respiratory insufficiency with hypercarbia and transferred to [facility]. She denied any sick contacts or recent hospitalizations.     Overview/Hospital Course:  10/30:  Still on vent, cont current antibiotics, wean vent as tolerated    10/31:  Extubated this am, will continue to monitor    11/1:   Patient doing well, was informed by ICU staff that girlfriend states patient is a DNR. Discussion was had with Son (Shabbir Huffman) who is power of  [phone] and he confirmed DNR status. Patient likely to be stepped down from ICU today.     Interval History: Patient did not have any issues overnight. Tolerating being on nasal canula.     Review of Systems   Constitutional: Negative for fatigue and fever.   Respiratory: Negative for shortness of breath.    Cardiovascular: Negative for chest pain.   Gastrointestinal: Negative for nausea and vomiting.   Skin: Negative for rash.     Objective:     Vital Signs (Most Recent):  Temp: 98 °F (36.7 °C) (11/01/19 1105)  Pulse: 105 (11/01/19 1105)  Resp: (!) 26 (11/01/19 1105)  BP: (!) 146/90 (11/01/19 1105)  SpO2: 97 % (11/01/19 1105) Vital Signs  (24h Range):  Temp:  [98 °F (36.7 °C)-99.2 °F (37.3 °C)] 98 °F (36.7 °C)  Pulse:  [] 105  Resp:  [21-35] 26  SpO2:  [90 %-98 %] 97 %  BP: ()/() 146/90     Weight: 77.9 kg (171 lb 11.8 oz)  Body mass index is 21.47 kg/m².    Intake/Output Summary (Last 24 hours) at 11/1/2019 1145  Last data filed at 11/1/2019 1100  Gross per 24 hour   Intake 1690 ml   Output 1330 ml   Net 360 ml      Physical Exam   Constitutional: No distress.   Lethargic, thin appearance    HENT:   Head: Normocephalic and atraumatic.   Cardiovascular: Exam reveals no gallop and no friction rub.   No murmur heard.  Irregular irregular   Pulmonary/Chest: Effort normal. No stridor. No respiratory distress. He has no wheezes. He has rales (RLL).   Abdominal: Soft. Bowel sounds are normal. He exhibits no distension and no mass. There is no tenderness. There is no guarding.   Musculoskeletal: He exhibits no edema.   Neurological:   sedated   Skin: Skin is warm. No rash noted. He is not diaphoretic.       Significant Labs:   Recent Lab Results       11/01/19  0827   11/01/19  0354        Procalcitonin 13.06  Comment:  A concentration < 0.25 ng/mL represents a low risk bacterial   infection.  Procalcitonin may not be accurate among patients with localized   infection, recent trauma or major surgery, immunosuppressed state,   invasive fungal infection, renal dysfunction. Decisions regarding   initiation or continuation of antibiotic therapy should not be based   solely on procalcitonin levels.         Anion Gap   8     Baso #   0.02     Basophil%   0.2     BUN, Bld   53     Calcium   9.1     Chloride   112     CO2   21     Creatinine   1.2     Differential Method   Automated     eGFR if    >60     eGFR if non    54  Comment:  Calculation used to obtain the estimated glomerular filtration  rate (eGFR) is the CKD-EPI equation.        Eos #   0.2     Eosinophil%   1.5     Glucose   128     Gran # (ANC)   10.2      Gran%   89.9     Hematocrit   34.5     Hemoglobin   11.4     Immature Grans (Abs)   0.07  Comment:  Mild elevation in immature granulocytes is non specific and   can be seen in a variety of conditions including stress response,   acute inflammation, trauma and pregnancy. Correlation with other   laboratory and clinical findings is essential.       Immature Granulocytes   0.6     Coumadin Monitoring INR   4.2  Comment:  Coumadin Therapy:  2.0 - 3.0 for INR for all indicators except mechanical heart valves  and antiphospholipid syndromes which should use 2.5 - 3.5.       Lymph #   0.2     Lymph%   2.1     Magnesium   2.0     MCH   32.8     MCHC   33.0     MCV   99  Comment:  Results confirmed, test repeated     Mono #   0.7     Mono%   5.7     MPV   10.3     nRBC   0     Platelets   111     Potassium   3.9     Protime   43.4     RBC   3.48     RDW   13.9     Sodium   141     WBC   11.37           Significant Imaging: I have reviewed all pertinent imaging results/findings within the past 24 hours.      Assessment/Plan:      * Aspiration pneumonia of right lower lobe due to gastric secretions  10/31:  Continue vanc, cefepime, and flagyl  procalcitonin trending up  WBC within normal limits  Chest xray ordered  Continue to monitor for response  Repeat procalcitonin in am  Sputum culture normal mikel    11/1:  Currently on vanc, cefepime and flagyl   procalcitonin repeated today trending down   Continue current abx  Plan to dc vanc in am   Continue to monitor procal for response   Blood cultures no growth  Will start PT/OT prior to discharge  Discussion was had with son who states he can come down and drive patient up to the [facility] [place]   Informed son that if patient is stable for discharge he can likely fly home      Physical deconditioning  11/1:  Will start PT/OT/ST to evaluate for any possible needs       Coumadin toxicity  10/31:  INR elevated  Pharm to dose  Hold dose for today       Parkinson  disease  10/29:  Resume home meds      Sepsis        Acute respiratory failure with hypoxia  10/29:  Likely 2/2 to aspiration PNA  Sputum culture pending  Blood culture pending  On vanc, cefepime, and flagyl  Continue to wean vent as tolerated   Reviewed blood gas     10/30:  Currently on vent   Sputum cultures pending  Continue current antibiotics   Wean vent as tolerated   Reviewed blood gas today  Will order procal for trend in am           Metabolic acidosis with respiratory acidosis        Atrial fibrillation with RVR  10/29:  Not on rate control medical medications at home  HR ok for now  On coumadin   Pharm to dose   Metoprolol IV PRN     10/30:  Rate controlled     10/31:  Controlled     11/1:  Rate controlled, coumadin managed by pharm     SOB (shortness of breath)          VTE Risk Mitigation (From admission, onward)         Ordered     warfarin ([facility] ONLY) (COUMADIN) tablet 5 mg  Daily      10/31/19 0942     Place sequential compression device  Until discontinued      10/29/19 1630     IP VTE HIGH RISK PATIENT  Once      10/29/19 1630              Reviewed echo which showed an EF of 30%. No reported history of CHF. Will consult cardiology on case.     Critical care time spent on the evaluation and treatment of severe organ dysfunction, review of pertinent labs and imaging studies, discussions with consulting providers and discussions with patient/family: 35 minutes.      Allan Guzman MD  Department of Hospital Medicine   [facility]

## 2019-11-01 NOTE — PLAN OF CARE
Recommendations     Recommendation: 1. Await ST eval/recs. 2. If unable to initiate PO diet, continue enteral nutrition & change TF regimen to Isosource 1.5 @ 55 ml/hr+ 1 pack of beneprotein (2005 kcal, 95 g protein & 1008 ml free water). Flushes per MD/NP. 3. Will continue to monitor.   Intervention: Collaboration with other providers   Goals: Meet > 85 % EEN/EPN by RD f/u   Nutrition Goal Status: new  Communication of RD Recs: (POC, sticky note, reviewed with NP)

## 2019-11-01 NOTE — ASSESSMENT & PLAN NOTE
10/31:  Continue vanc, cefepime, and flagyl  procalcitonin trending up  WBC within normal limits  Chest xray ordered  Continue to monitor for response  Repeat procalcitonin in am  Sputum culture normal mikel    11/1:  Currently on vanc, cefepime and flagyl   procalcitonin repeated today trending down   Continue current abx  Plan to dc vanc in am   Continue to monitor procal for response   Blood cultures no growth  Will start PT/OT prior to discharge  Discussion was had with son who states he can come down and drive patient up to the [facility]   Informed son that if patient is stable for discharge he can likely fly home

## 2019-11-01 NOTE — PHYSICIAN QUERY
"PT Name: Shaq Huffman  MR #: [id number]    Physician Query Form - Heart  Condition Clarification     CDS/: Delma Garibay               Contact information:  This form is a permanent document in the medical record.     Query Date: November 1, 2019    By submitting this query, we are merely seeking further clarification of documentation. Please utilize your independent clinical judgment when addressing the question(s) below.    The medical record contains the following   Indicators     Supporting Clinical Findings Location in Medical Record   X BNP       PulJOHN butler ACNP/ Dr. Fernandez, 10/29   X EF  EF 35-40%    Echo 10/30   X Radiology findings  CXR: Patchy interstitial and alveolar opacities within the right lower lobe concerning for pneumonia.  Chronic interstitial changes are suspected throughout the lungs greatest within the upper lobes.  Question trace right pleural effusion.  No evidence of pneumothorax.    CXR 10/29   X Echo Results  CONCLUSIONS     1 - Moderately depressed left ventricular systolic function (EF 35-40%).     2 - Restrictive LV filling pattern, indicating markedly elevated LAP (grade 3 diastolic dysfunction).     3 - Concentric hypertrophy.     4 - Right atrial enlargement.     5 - Right ventricular enlargement with moderately depressed systolic function.     6 - Pulmonary hypertension. The estimated PA systolic pressure is greater than 52 mmHg.     7 - Mild aortic stenosis, WAYNE = 1.35 cm2, AVAi = 0.66 cm2/m2, peak velocity = 2.49 m/s, mean gradient = 18 mmHg.     8 - Mild to moderate aortic regurgitation.     9 - Mild mitral regurgitation.    10 - Mild to moderate tricuspid regurgitation.     Echo 10/30    "Ascites" documented     X "SOB" or "VIZCARRA" documented  Positive for cough and shortness of breath    ED note, Dr. Gonzalez, 10/29   X "Hypoxia" documented  Acute respiratory failure with hypoxia and hypercapnia on mechanical ventilation  S/t pneumonia with underlying COPD      " "JOHN Wood ACNP/ Dr. Fernandez, 10/29   X Heart Failure documented  They deny a known history of coronary artery disease or heart failure     and lasix home med concerning for heart failure but not endorsed by [facility]    ED note, Dr. Gonzalez, 10/29       JOHN Wood ACNP/ Dr. Fernandez, 10/29   X "Edema" documented  He exhibits no edema.      Mild general edema     H&P, Dr. Guzman, 10/29     Nina, ZAIRA Cobos, NP/Dr. Guzman, 11/1   X Diuretics/Meds  lasix home med     JOHN Wood ACNP/ Dr. Fernandez, 10/29    Treatment:      Other:      Heart failure (HF) can be acute, chronic or both. It is generally further specificed as systolic, diastolic, or combined. Lastly, it is important to identify an underlying etiology if known or suspected.     Common clues to acute exacerbation:  Rapidly progressive symptoms (w/in 2 weeks of presentation), using IV diuretics to treat, using supplemental O2, pulmonary edema on Xray, MI w/in 4 weeks, and/or BNP >500    Systolic Heart Failure: is defined as chart documentation of a left ventricular ejection fraction (LVEF) less than 40%     Diastolic Heart Failure: is defined as a left ventricular ejection fraction (LVEF) greater than 40%   +      Evidence of diastolic dysfunction on echocardiography OR    Right heart catheterization wedge pressure above 12 mm Hg OR    Left heart catheterization left ventricular end diastolic pressure 18 mm Hg or above.    References: *American Heart Association    The clinical guidelines noted below are only system guidelines, and do not replace the providers clinical judgment.     Provider, please specify the diagnosis associated with above clinical findings    [  x ] Acute Systolic Heart Failure - New diagnosis.  EF < 40%  and acute HF symptoms documented     [   ] Acute on Chronic Systolic Heart Failure- Pre-existing systolic HF diagnosis.  EF < 40%  and acute HF symptoms documented   [   ] Chronic Systolic Heart Failure - Pre-existing systolic " HF diagnosis.  EF < 40%  without  acute HF symptoms documented    [   ] Acute Diastolic Heart Failure - New diagnosis.  EF > 40%  and acute HF symptoms documented   [   ] Acute on Chronic Diastolic Heart Failure -    Pre-existing diastoic HF diagnosis.  EF > 40%  and acute HF symptoms documented       [   ] Chronic Diastolic Heart Failure - Pre-existing diastolic HF diagnosis.  EF > 40%  without  acute HF symptoms documented   [   ] Acute Combined Systolic and Diastolic Heart Failure    [   ] Acute on Chronic Combined Systolic and Diastolic Heart Failure      [   ] Chronic Combined Systolic and Diastolic Heart Failure   [   ] Other Type of Heart Failure (please specify type):   [   ] Heart Failure Ruled Out   [   ] Other (please specify):   [  ] Clinically Undetermined                           Please document in your progress notes daily for the duration of treatment until resolved and include in your discharge summary.

## 2019-11-01 NOTE — SUBJECTIVE & OBJECTIVE
Review of Systems   Constitutional: Positive for malaise/fatigue. Negative for chills and fever.   HENT: Negative for congestion.    Eyes: Negative for blurred vision.   Respiratory: Positive for cough. Negative for sputum production and shortness of breath.    Cardiovascular: Negative for chest pain and leg swelling.   Gastrointestinal: Negative for abdominal pain, nausea and vomiting.   Genitourinary: Negative for dysuria.   Musculoskeletal: Negative for myalgias.   Skin: Negative for rash.   Neurological: Negative for dizziness, weakness and headaches.   Endo/Heme/Allergies: Does not bruise/bleed easily.   Psychiatric/Behavioral: The patient is not nervous/anxious.          Objective:     Vital Signs (Most Recent):  Temp: 99.2 °F (37.3 °C) (11/01/19 0705)  Pulse: 102 (11/01/19 1005)  Resp: (!) 24 (11/01/19 1005)  BP: 136/82 (11/01/19 1005)  SpO2: (!) 94 % (11/01/19 1005) Vital Signs (24h Range):  Temp:  [98.4 °F (36.9 °C)-99.2 °F (37.3 °C)] 99.2 °F (37.3 °C)  Pulse:  [] 102  Resp:  [21-35] 24  SpO2:  [90 %-98 %] 94 %  BP: ()/() 136/82     Weight: 77.9 kg (171 lb 11.8 oz)  Body mass index is 21.47 kg/m².      Intake/Output Summary (Last 24 hours) at 11/1/2019 1140  Last data filed at 11/1/2019 1009  Gross per 24 hour   Intake 1630 ml   Output 1295 ml   Net 335 ml       Physical Exam   Constitutional: Vital signs are normal. He appears well-developed. He appears cachectic. He is cooperative. He has a sickly appearance. He appears ill. No distress. Nasal cannula in place.   Frail appearing with barrel chest   HENT:   Head: Atraumatic.   Nose:       Mouth/Throat: Oropharynx is clear and moist.   Mild to mod bilat temporal wasting   Eyes: Pupils are equal, round, and reactive to light. Conjunctivae are normal.   Neck: Neck supple. No JVD present. No tracheal deviation present.   Cardiovascular: Normal rate. An irregularly irregular rhythm present.   Pulses:       Radial pulses are 2+ on the right  side, and 2+ on the left side.        Dorsalis pedis pulses are 2+ on the right side, and 2+ on the left side.   Pulmonary/Chest: No accessory muscle usage. No tachypnea. No respiratory distress. He has decreased breath sounds in the right lower field and the left lower field. He has no wheezes. He has no rales.   Abdominal: Soft. He exhibits no distension. Bowel sounds are decreased. There is no tenderness.   Genitourinary: Penis normal.   Genitourinary Comments: [place] in place   Musculoskeletal: He exhibits no edema.   Mild general edema   Lymphadenopathy:     He has no cervical adenopathy.   Neurological: He is alert.   Fully awake and alert and verbally responsive.  Mild confusion   Skin: Skin is warm and dry. Capillary refill takes less than 2 seconds. Bruising noted. No cyanosis. Nails show clubbing.        Psychiatric: Judgment and thought content normal. His affect is blunt. His speech is delayed. He is slowed.       Vents:  Vent Mode: A/C (10/31/19 0733)  Ventilator Initiated: Yes (10/29/19 1143)  Set Rate: 18 bmp (10/31/19 0733)  Vt Set: 450 mL (10/31/19 0733)  Pressure Support: 0 cmH20 (10/31/19 0733)  PEEP/CPAP: 5 cmH20 (10/31/19 0733)  Oxygen Concentration (%): 40 (10/31/19 1541)  Peak Airway Pressure: 20 cmH2O (10/31/19 0733)  Plateau Pressure: 15 cmH20 (10/31/19 0733)  Total Ve: 11 mL (10/31/19 0733)  F/VT Ratio<105 (RSBI): (!) 26.16 (10/31/19 0733)    Lines/Drains/Airways     Peripherally Inserted Central Catheter Line                 PICC Double Lumen 10/30/19 0445 left brachial 2 days          Drain                 NG/OG Tube 10/29/19 1200 [place] sump 16 Fr. Left nostril 2 days         Urethral Catheter 10/29/19 1440 16 Fr. 2 days                Significant Labs:    CBC/Anemia Profile:  Recent Labs   Lab 10/31/19  0345 11/01/19  0354   WBC 12.57 11.37   HGB 12.1* 11.4*   HCT 38.1* 34.5*   * 111*   * 99*   RDW 14.2 13.9        Chemistries:  Recent Labs   Lab 10/31/19  7353  11/01/19  0354    141   K 4.6 3.9    112*   CO2 21* 21*   BUN 51* 53*   CREATININE 1.3 1.2   CALCIUM 8.8 9.1   MG 1.8 2.0       Coagulation:   Recent Labs   Lab 11/01/19 0354   INR 4.2*     All pertinent labs within the past 24 hours have been reviewed.    Significant Imaging:  CXR: I have reviewed all pertinent results/findings within the past 24 hours and my personal findings are:  Stable RLL infiltrate

## 2019-11-01 NOTE — PHYSICIAN QUERY
PT Name: Shaq Huffman  MR #: [id number]    Physician Query Form - Atrial Fibrillation Specificity     CDS/: GERI Brumfield, RN, CDS               Contact information:[email or web address]     This form is a permanent document in the medical record.     Query Date: November 1, 2019    By submitting this query, we are merely seeking further clarification of documentation. Please utilize your independent clinical judgment when addressing the question(s) below.    The medical record contains the following:   Indicators     Supporting Clinical Findings Location in Medical Record   X Atrial Fibrillation  Patient has a history of atrial fibrillation     Atrial fibrillation     Ed provider notes, Dr. Gonzalez, 10/29     Dr. Guzman, 11/1   X EKG results  EKG: Atrial fibrillation with rapid ventricular response  Left axis deviation  Incomplete right bundle branch block  Nonspecific ST and T wave abnormality  Abnormal ECG     EKG: Atrial fibrillation  Left axis deviation  Abnormal ECG  When compared with ECG of 29-OCT-2019 08:13,  Vent. rate has decreased BY  47 BPM  EKG 10/29               EKG 10/30   X Medication  Not on rate control medical medications at home     On coumadin     Dr. Megan MCCAIN, 11/1   X Treatment  Metoprolol IV PRN     Dr. Guzman, 11/1   X Other  Not on rate control medical medications at home    Dr. Megan MCCAIN, 11/1       Provider, please further specify the Atrial Fibrillation diagnosis.    [ x ] Chronic   [  ] Other (please specify):   [  ] Clinically Undetermined       Please document in your progress notes daily for the duration of treatment until resolved, and include in your discharge summary.

## 2019-11-01 NOTE — ASSESSMENT & PLAN NOTE
10/29:  Not on rate control medical medications at home  HR ok for now  On coumadin   Pharm to dose   Metoprolol IV PRN     10/30:  Rate controlled     10/31:  Controlled     11/1:  Rate controlled, coumadin managed by pharm

## 2019-11-01 NOTE — PLAN OF CARE
Pt vital signs stable today, stepped down to telemetry awaiting bed. Pt currently on 4L NC resting comfortably with spouse at bedside. Speech evaluated pt at bedside today but was unable to clear him for a diet. They will reassess tomorrow. Bed locked and in lowest position. Plan of care reviewed with pt and spouse. Will continue to monitor pt closely.     Jarrod Vidal RN  11/01/2019  4:38 PM

## 2019-11-02 LAB
ANION GAP SERPL CALC-SCNC: 8 MMOL/L (ref 8–16)
BASOPHILS # BLD AUTO: 0.04 K/UL (ref 0–0.2)
BASOPHILS NFR BLD: 0.3 % (ref 0–1.9)
BUN SERPL-MCNC: 50 MG/DL (ref 8–23)
CALCIUM SERPL-MCNC: 10 MG/DL (ref 8.7–10.5)
CHLORIDE SERPL-SCNC: 115 MMOL/L (ref 95–110)
CO2 SERPL-SCNC: 22 MMOL/L (ref 23–29)
CREAT SERPL-MCNC: 1 MG/DL (ref 0.5–1.4)
DIFFERENTIAL METHOD: ABNORMAL
EOSINOPHIL # BLD AUTO: 0.2 K/UL (ref 0–0.5)
EOSINOPHIL NFR BLD: 1.2 % (ref 0–8)
ERYTHROCYTE [DISTWIDTH] IN BLOOD BY AUTOMATED COUNT: 14.1 % (ref 11.5–14.5)
EST. GFR  (AFRICAN AMERICAN): >60 ML/MIN/1.73 M^2
EST. GFR  (NON AFRICAN AMERICAN): >60 ML/MIN/1.73 M^2
GLUCOSE SERPL-MCNC: 102 MG/DL (ref 70–110)
HCT VFR BLD AUTO: 38.3 % (ref 40–54)
HGB BLD-MCNC: 12.3 G/DL (ref 14–18)
IMM GRANULOCYTES # BLD AUTO: 0.11 K/UL (ref 0–0.04)
IMM GRANULOCYTES NFR BLD AUTO: 0.9 % (ref 0–0.5)
INR PPP: 2.6 (ref 0.8–1.2)
LYMPHOCYTES # BLD AUTO: 0.5 K/UL (ref 1–4.8)
LYMPHOCYTES NFR BLD: 4.1 % (ref 18–48)
MAGNESIUM SERPL-MCNC: 1.8 MG/DL (ref 1.6–2.6)
MCH RBC QN AUTO: 31.9 PG (ref 27–31)
MCHC RBC AUTO-ENTMCNC: 32.1 G/DL (ref 32–36)
MCV RBC AUTO: 99 FL (ref 82–98)
MONOCYTES # BLD AUTO: 1.1 K/UL (ref 0.3–1)
MONOCYTES NFR BLD: 8.5 % (ref 4–15)
NEUTROPHILS # BLD AUTO: 11 K/UL (ref 1.8–7.7)
NEUTROPHILS NFR BLD: 85 % (ref 38–73)
NRBC BLD-RTO: 0 /100 WBC
PLATELET # BLD AUTO: 131 K/UL (ref 150–350)
PMV BLD AUTO: 10 FL (ref 9.2–12.9)
POTASSIUM SERPL-SCNC: 4 MMOL/L (ref 3.5–5.1)
PROTHROMBIN TIME: 27.1 SEC (ref 9–12.5)
RBC # BLD AUTO: 3.86 M/UL (ref 4.6–6.2)
SODIUM SERPL-SCNC: 145 MMOL/L (ref 136–145)
WBC # BLD AUTO: 12.87 K/UL (ref 3.9–12.7)

## 2019-11-02 PROCEDURE — 25000003 PHARM REV CODE 250: Performed by: NURSE PRACTITIONER

## 2019-11-02 PROCEDURE — 25000003 PHARM REV CODE 250: Performed by: EMERGENCY MEDICINE

## 2019-11-02 PROCEDURE — 63600175 PHARM REV CODE 636 W HCPCS: Performed by: INTERNAL MEDICINE

## 2019-11-02 PROCEDURE — 97167 OT EVAL HIGH COMPLEX 60 MIN: CPT | Performed by: PHYSICAL THERAPIST

## 2019-11-02 PROCEDURE — 99223 PR INITIAL HOSPITAL CARE,LEVL III: ICD-10-PCS | Mod: ,,, | Performed by: INTERNAL MEDICINE

## 2019-11-02 PROCEDURE — 97530 THERAPEUTIC ACTIVITIES: CPT | Performed by: PHYSICAL THERAPIST

## 2019-11-02 PROCEDURE — 25000242 PHARM REV CODE 250 ALT 637 W/ HCPCS: Performed by: NURSE PRACTITIONER

## 2019-11-02 PROCEDURE — 36415 COLL VENOUS BLD VENIPUNCTURE: CPT

## 2019-11-02 PROCEDURE — 27000221 HC OXYGEN, UP TO 24 HOURS

## 2019-11-02 PROCEDURE — 97163 PT EVAL HIGH COMPLEX 45 MIN: CPT

## 2019-11-02 PROCEDURE — 97530 THERAPEUTIC ACTIVITIES: CPT

## 2019-11-02 PROCEDURE — 83735 ASSAY OF MAGNESIUM: CPT

## 2019-11-02 PROCEDURE — 92526 ORAL FUNCTION THERAPY: CPT

## 2019-11-02 PROCEDURE — 99900035 HC TECH TIME PER 15 MIN (STAT)

## 2019-11-02 PROCEDURE — 21400001 HC TELEMETRY ROOM

## 2019-11-02 PROCEDURE — 85025 COMPLETE CBC W/AUTO DIFF WBC: CPT

## 2019-11-02 PROCEDURE — 99223 1ST HOSP IP/OBS HIGH 75: CPT | Mod: ,,, | Performed by: INTERNAL MEDICINE

## 2019-11-02 PROCEDURE — 63600175 PHARM REV CODE 636 W HCPCS: Performed by: NURSE PRACTITIONER

## 2019-11-02 PROCEDURE — 85610 PROTHROMBIN TIME: CPT

## 2019-11-02 PROCEDURE — S0030 INJECTION, METRONIDAZOLE: HCPCS | Performed by: NURSE PRACTITIONER

## 2019-11-02 PROCEDURE — 80048 BASIC METABOLIC PNL TOTAL CA: CPT

## 2019-11-02 PROCEDURE — 94761 N-INVAS EAR/PLS OXIMETRY MLT: CPT

## 2019-11-02 PROCEDURE — A4216 STERILE WATER/SALINE, 10 ML: HCPCS | Performed by: NURSE PRACTITIONER

## 2019-11-02 PROCEDURE — 25000003 PHARM REV CODE 250: Performed by: INTERNAL MEDICINE

## 2019-11-02 PROCEDURE — 94640 AIRWAY INHALATION TREATMENT: CPT

## 2019-11-02 RX ORDER — IPRATROPIUM BROMIDE AND ALBUTEROL SULFATE 2.5; .5 MG/3ML; MG/3ML
3 SOLUTION RESPIRATORY (INHALATION) EVERY 4 HOURS PRN
Status: DISCONTINUED | OUTPATIENT
Start: 2019-11-02 | End: 2019-11-07 | Stop reason: HOSPADM

## 2019-11-02 RX ORDER — LISINOPRIL 20 MG/1
20 TABLET ORAL DAILY
Status: DISCONTINUED | OUTPATIENT
Start: 2019-11-02 | End: 2019-11-07 | Stop reason: HOSPADM

## 2019-11-02 RX ORDER — CARVEDILOL 6.25 MG/1
6.25 TABLET ORAL 2 TIMES DAILY
Status: DISCONTINUED | OUTPATIENT
Start: 2019-11-02 | End: 2019-11-07 | Stop reason: HOSPADM

## 2019-11-02 RX ORDER — WARFARIN 2.5 MG/1
2.5 TABLET ORAL DAILY
Status: DISCONTINUED | OUTPATIENT
Start: 2019-11-02 | End: 2019-11-06

## 2019-11-02 RX ORDER — FUROSEMIDE 10 MG/ML
40 INJECTION INTRAMUSCULAR; INTRAVENOUS 2 TIMES DAILY
Status: DISCONTINUED | OUTPATIENT
Start: 2019-11-02 | End: 2019-11-03

## 2019-11-02 RX ORDER — METHYLPREDNISOLONE SOD SUCC 125 MG
125 VIAL (EA) INJECTION ONCE
Status: COMPLETED | OUTPATIENT
Start: 2019-11-02 | End: 2019-11-02

## 2019-11-02 RX ADMIN — Medication 10 ML: at 07:11

## 2019-11-02 RX ADMIN — MELATONIN 1000 UNITS: at 10:11

## 2019-11-02 RX ADMIN — CARVEDILOL 6.25 MG: 6.25 TABLET, FILM COATED ORAL at 10:11

## 2019-11-02 RX ADMIN — CARBIDOPA AND LEVODOPA 1 TABLET: 25; 100 TABLET ORAL at 03:11

## 2019-11-02 RX ADMIN — IPRATROPIUM BROMIDE AND ALBUTEROL SULFATE 3 ML: .5; 3 SOLUTION RESPIRATORY (INHALATION) at 01:11

## 2019-11-02 RX ADMIN — METRONIDAZOLE 500 MG: 500 INJECTION, SOLUTION INTRAVENOUS at 02:11

## 2019-11-02 RX ADMIN — LISINOPRIL 20 MG: 20 TABLET ORAL at 10:11

## 2019-11-02 RX ADMIN — BUDESONIDE 0.5 MG: 0.5 INHALANT RESPIRATORY (INHALATION) at 07:11

## 2019-11-02 RX ADMIN — Medication 10 ML: at 11:11

## 2019-11-02 RX ADMIN — WARFARIN SODIUM 2.5 MG: 2.5 TABLET ORAL at 06:11

## 2019-11-02 RX ADMIN — CARBIDOPA AND LEVODOPA 1 TABLET: 25; 100 TABLET ORAL at 10:11

## 2019-11-02 RX ADMIN — IPRATROPIUM BROMIDE AND ALBUTEROL SULFATE 3 ML: .5; 3 SOLUTION RESPIRATORY (INHALATION) at 08:11

## 2019-11-02 RX ADMIN — CEFEPIME HYDROCHLORIDE 2 G: 2 INJECTION, SOLUTION INTRAVENOUS at 06:11

## 2019-11-02 RX ADMIN — ARFORMOTEROL TARTRATE 15 MCG: 15 SOLUTION RESPIRATORY (INHALATION) at 08:11

## 2019-11-02 RX ADMIN — POLYETHYLENE GLYCOL (3350) 17 G: 17 POWDER, FOR SOLUTION ORAL at 10:11

## 2019-11-02 RX ADMIN — FUROSEMIDE 40 MG: 10 INJECTION, SOLUTION INTRAMUSCULAR; INTRAVENOUS at 10:11

## 2019-11-02 RX ADMIN — METHYLPREDNISOLONE SODIUM SUCCINATE 125 MG: 125 INJECTION, POWDER, FOR SOLUTION INTRAMUSCULAR; INTRAVENOUS at 04:11

## 2019-11-02 RX ADMIN — ARFORMOTEROL TARTRATE 15 MCG: 15 SOLUTION RESPIRATORY (INHALATION) at 07:11

## 2019-11-02 RX ADMIN — IPRATROPIUM BROMIDE AND ALBUTEROL SULFATE 3 ML: .5; 3 SOLUTION RESPIRATORY (INHALATION) at 02:11

## 2019-11-02 RX ADMIN — BUDESONIDE 0.5 MG: 0.5 INHALANT RESPIRATORY (INHALATION) at 08:11

## 2019-11-02 RX ADMIN — METRONIDAZOLE 500 MG: 500 INJECTION, SOLUTION INTRAVENOUS at 10:11

## 2019-11-02 RX ADMIN — CARBIDOPA AND LEVODOPA 1 TABLET: 25; 100 TABLET ORAL at 08:11

## 2019-11-02 RX ADMIN — FUROSEMIDE 40 MG: 10 INJECTION, SOLUTION INTRAMUSCULAR; INTRAVENOUS at 06:11

## 2019-11-02 RX ADMIN — CEFEPIME HYDROCHLORIDE 2 G: 2 INJECTION, SOLUTION INTRAVENOUS at 05:11

## 2019-11-02 RX ADMIN — METRONIDAZOLE 500 MG: 500 INJECTION, SOLUTION INTRAVENOUS at 07:11

## 2019-11-02 RX ADMIN — CARVEDILOL 6.25 MG: 6.25 TABLET, FILM COATED ORAL at 08:11

## 2019-11-02 RX ADMIN — IPRATROPIUM BROMIDE AND ALBUTEROL SULFATE 3 ML: .5; 3 SOLUTION RESPIRATORY (INHALATION) at 07:11

## 2019-11-02 RX ADMIN — Medication 10 ML: at 06:11

## 2019-11-02 RX ADMIN — FAMOTIDINE 20 MG: 20 TABLET ORAL at 10:11

## 2019-11-02 NOTE — ASSESSMENT & PLAN NOTE
-2D echo showed EF of 35-40%, DD, RVE with moderately depressed systolic function  -Diurese with IV Lasix 40 mg BID  -Continue BB, ACEi  -Will need ischemic evaluation once respiratory status improves

## 2019-11-02 NOTE — PLAN OF CARE
Discussed Plan of Care with patient and verbalized understanding - Patient remains AAOx4 - remains free of falls, accidents and trauma during the day shift. Bed is in the low position and the call light is within reach. Tolerating tube feeding with zero residual. Will continue to monitor

## 2019-11-02 NOTE — SUBJECTIVE & OBJECTIVE
Past Medical History:   Diagnosis Date    A-fib     COPD (chronic obstructive pulmonary disease)        History reviewed. No pertinent surgical history.    Review of patient's allergies indicates:  No Known Allergies    No current facility-administered medications on file prior to encounter.      Current Outpatient Medications on File Prior to Encounter   Medication Sig    albuterol (PROVENTIL) 2.5 mg /3 mL (0.083 %) nebulizer solution Take 2.5 mg by nebulization every 6 (six) hours as needed for Wheezing. Rescue    brimonidine 0.15 % OPTH DROP (ALPHAGAN) 0.15 % ophthalmic solution 1 drop 3 (three) times daily.    brinzolamide/brimonidine tart (SIMBRINZA OPHT) Apply to eye.    carbidopa-levodopa  mg (SINEMET)  mg per tablet     fluticasone propionate (FLOVENT HFA) 110 mcg/actuation inhaler Inhale 1 puff into the lungs 2 (two) times daily. Controller    furosemide (LASIX) 20 MG tablet     lisinopril (PRINIVIL,ZESTRIL) 20 MG tablet     vit C/vit E ac/lut/copper/zinc (PRESERVISION LUTEIN ORAL) Take by mouth.    vitamin D (VITAMIN D3) 1000 units Tab Take 1,000 Units by mouth once daily.    warfarin (COUMADIN) 5 MG tablet     bevacizumab (AVASTIN) 2.5 mg/0.10 mL injection Inject into the eye.     Family History     None        Tobacco Use    Smoking status: Former Smoker   Substance and Sexual Activity    Alcohol use: Not on file    Drug use: Not on file    Sexual activity: Not on file     Review of Systems   Constitution: Positive for malaise/fatigue.   HENT: Negative.    Eyes: Negative.    Cardiovascular: Positive for dyspnea on exertion.   Respiratory: Positive for cough, shortness of breath and wheezing.    Endocrine: Negative.    Hematologic/Lymphatic: Bruises/bleeds easily.   Skin: Negative.    Musculoskeletal: Negative.    Gastrointestinal: Negative.    Genitourinary: Negative.    Neurological: Negative.    Psychiatric/Behavioral: Negative.    Allergic/Immunologic: Negative.       Objective:     Vital Signs (Most Recent):  Temp: 98.7 °F (37.1 °C) (11/02/19 0744)  Pulse: (!) 113 (11/02/19 0747)  Resp: (!) 22 (11/02/19 0747)  BP: (!) 154/107 (11/02/19 0744)  SpO2: 95 % (11/02/19 0747) Vital Signs (24h Range):  Temp:  [98 °F (36.7 °C)-99.3 °F (37.4 °C)] 98.7 °F (37.1 °C)  Pulse:  [] 113  Resp:  [18-31] 22  SpO2:  [93 %-99 %] 95 %  BP: (128-158)/() 154/107     Weight: 77.9 kg (171 lb 11.8 oz)  Body mass index is 21.47 kg/m².    SpO2: 95 %  O2 Device (Oxygen Therapy): nasal cannula      Intake/Output Summary (Last 24 hours) at 11/2/2019 1140  Last data filed at 11/2/2019 0500  Gross per 24 hour   Intake 650 ml   Output 875 ml   Net -225 ml       Lines/Drains/Airways     Peripherally Inserted Central Catheter Line                 PICC Double Lumen 10/30/19 0445 left brachial 3 days          Drain                 NG/OG Tube 10/29/19 1200 [place] sump 16 Fr. Left nostril 3 days         Urethral Catheter 10/29/19 1440 16 Fr. 3 days                Physical Exam   Constitutional: He is oriented to person, place, and time. He appears well-developed and well-nourished. He appears distressed.   HENT:   Head: Normocephalic and atraumatic.   Eyes: Pupils are equal, round, and reactive to light. Right eye exhibits no discharge. Left eye exhibits no discharge.   Neck: Neck supple. No JVD present.   Cardiovascular: Normal rate, regular rhythm, S1 normal and S2 normal. Exam reveals no friction rub.   Murmur heard.   Harsh midsystolic murmur is present at the upper right sternal border radiating to the neck.  Pulmonary/Chest: He is in respiratory distress. He has wheezes.   Coarse BS at bases   Abdominal: Soft.   Musculoskeletal: He exhibits no edema.   Neurological: He is alert and oriented to person, place, and time.   Skin: Skin is warm and dry. He is not diaphoretic. No erythema.   Psychiatric: He has a normal mood and affect. His behavior is normal. Thought content normal.   Nursing note and  vitals reviewed.      Significant Labs:   CMP   Recent Labs   Lab 11/01/19  0354 11/02/19  0342    145   K 3.9 4.0   * 115*   CO2 21* 22*   * 102   BUN 53* 50*   CREATININE 1.2 1.0   CALCIUM 9.1 10.0   ANIONGAP 8 8   ESTGFRAFRICA >60 >60   EGFRNONAA 54* >60   , CBC   Recent Labs   Lab 11/01/19  0354 11/02/19  0342   WBC 11.37 12.87*   HGB 11.4* 12.3*   HCT 34.5* 38.3*   * 131*   , Troponin No results for input(s): TROPONINI in the last 48 hours. and All pertinent lab results from the last 24 hours have been reviewed.    Significant Imaging: Echocardiogram: 2D echo with color flow doppler: No results found for this or any previous visit., EKG: Reviewed and X-Ray: CXR: X-Ray Chest 1 View (CXR):   Results for orders placed or performed during the hospital encounter of 10/29/19   X-Ray Chest 1 View    Narrative    EXAMINATION:  XR CHEST 1 VIEW    CLINICAL HISTORY:  Pneumonia.    COMPARISON:  Chest x-ray, 11/01/2019.    FINDINGS:  There is an NG tube with its tip in the body of the stomach.  There is a left-sided PICC with its tip in the SVC.  There is marked cardiomegaly.  There is extensive infiltrate throughout the right mid lung field and right lower lung field, unchanged.  Chronic coarsening of the bronchovascular interstitium is noted.      Impression    See above      Electronically signed by: Alex Carlos MD  Date:    11/02/2019  Time:    08:29    and X-Ray Chest PA and Lateral (CXR): No results found for this visit on 10/29/19.

## 2019-11-02 NOTE — PROGRESS NOTES
[facility] Hospital Medicine  Progress Note    Patient Name: Shaq Huffman  MRN: [id number]  Patient Class: IP- Inpatient   Admission Date: 10/29/2019  Length of Stay: 4 days  Attending Physician: Kalen Allan MD  Primary Care Provider: Provider Notinsystem        Subjective:     Principal Problem:Aspiration pneumonia of right lower lobe due to gastric secretions        HPI:  Patient is an 87 y/o male with a PMH of parkinsons, afib, GERD, COPD presents as transfer from [facility] for respiratory failure. Patient was intubated prior to arrival and history was obtained by partner. She reports her and patient were on a cruise leaving out of [place] and patient developed some breathing difficulty. Cruise healthcare professionals were called patient was taken to [facility]. He was intubated there due to respiratory insufficiency with hypercarbia and transferred to [facility]. She denied any sick contacts or recent hospitalizations.     Overview/Hospital Course:  10/30:  Still on vent, cont current antibiotics, wean vent as tolerated    10/31:  Extubated this am, will continue to monitor    11/1:   Patient doing well, was informed by ICU staff that girlfriend states patient is a DNR. Discussion was had with Son (Shabbir Huffman) who is power of  [phone] and he confirmed DNR status. Patient likely to be stepped down from ICU today.   11/2: pt seen and examined, chart reviewed, d/w pt's partner and his son. Frail elderly man lying in bed, appears sick. NC and NGT in place, getting TF. CXR shows large R sided pneumonia, no dentures at present. WBC high with L shift. Bun up to 50.     Interval History: Frail elderly man lying in bed, appears sick. NC and NGT in place, getting TF. CXR shows large R sided pneumonia, no dentures at present. WBC high with L shift. Bun up to 50.     Review of Systems   Unable to perform ROS: Acuity of condition   Constitutional: Negative for fatigue  and fever.   Respiratory: Negative for shortness of breath.    Cardiovascular: Negative for chest pain.   Gastrointestinal: Negative for nausea and vomiting.   Skin: Negative for rash.     Objective:     Vital Signs (Most Recent):  Temp: 97.8 °F (36.6 °C) (11/02/19 1519)  Pulse: 104 (11/02/19 1546)  Resp: 18 (11/02/19 1519)  BP: 139/87 (11/02/19 1519)  SpO2: (!) 92 % (11/02/19 1519) Vital Signs (24h Range):  Temp:  [97.4 °F (36.3 °C)-99.3 °F (37.4 °C)] 97.8 °F (36.6 °C)  Pulse:  [] 104  Resp:  [17-28] 18  SpO2:  [92 %-98 %] 92 %  BP: (128-166)/() 139/87     Weight: 77.9 kg (171 lb 11.8 oz)  Body mass index is 21.47 kg/m².    Intake/Output Summary (Last 24 hours) at 11/2/2019 1730  Last data filed at 11/2/2019 0740  Gross per 24 hour   Intake 120 ml   Output 500 ml   Net -380 ml      Physical Exam   Constitutional: He appears lethargic. He has a sickly appearance. No distress. Nasal cannula in place.   Frail elderly man appears sick   HENT:   Head: Normocephalic and atraumatic.   edentulous   Eyes: Pupils are equal, round, and reactive to light. Conjunctivae and EOM are normal.   Neck: Normal range of motion. Neck supple.   Cardiovascular: Exam reveals no gallop and no friction rub.   No murmur heard.  Irregular irregular   Pulmonary/Chest: No stridor. He is in respiratory distress. He has no wheezes. He has rales.   Abdominal: Soft. Bowel sounds are normal. He exhibits no distension and no mass. There is no tenderness. There is no guarding.   Genitourinary:   Genitourinary Comments: deferred   Musculoskeletal: He exhibits no edema.   Neurological: He appears lethargic.   Skin: Skin is warm. No rash noted. He is not diaphoretic.   Nursing note and vitals reviewed.      Significant Labs:   ABGs:   Blood Culture:   BMP:   Recent Labs   Lab 11/02/19  0342         K 4.0   *   CO2 22*   BUN 50*   CREATININE 1.0   CALCIUM 10.0   MG 1.8     CBC:   Recent Labs   Lab 11/01/19  0353  11/02/19 0342   WBC 11.37 12.87*   HGB 11.4* 12.3*   HCT 34.5* 38.3*   * 131*     CMP:   Recent Labs   Lab 11/01/19 0354 11/02/19 0342    145   K 3.9 4.0   * 115*   CO2 21* 22*   * 102   BUN 53* 50*   CREATININE 1.2 1.0   CALCIUM 9.1 10.0   ANIONGAP 8 8   EGFRNONAA 54* >60     Coagulation:   Recent Labs   Lab 11/02/19 0342   INR 2.6*     Magnesium:   Recent Labs   Lab 11/01/19 0354 11/02/19 0342   MG 2.0 1.8     Pathology Results  (Last 10 years)    None        Respiratory Culture:   Urine Culture:   All pertinent labs within the past 24 hours have been reviewed.    Significant Imaging: I have reviewed all pertinent imaging results/findings within the past 24 hours.      Assessment/Plan:      * Aspiration pneumonia of right lower lobe due to gastric secretions  10/31:  Continue vanc, cefepime, and flagyl  procalcitonin trending up  WBC within normal limits  Chest xray ordered  Continue to monitor for response  Repeat procalcitonin in am  Sputum culture normal mikel    11/1:  Currently on vanc, cefepime and flagyl   procalcitonin repeated today trending down   Continue current abx  Plan to dc vanc in am   Continue to monitor procal for response   Blood cultures no growth  Will start PT/OT prior to discharge  Discussion was had with son who states he can come down and drive patient up to the [facility]   Informed son that if patient is stable for discharge he can likely fly home    Looks quiet sick and frail, still has congestion, very weak and easily dyspneic  Continue present care  Prognosis guarded to poor.    New onset of congestive heart failure  11/1:  Reviewed echo  No history of chf reported  EF of 30% here  Will consult cards on case      Physical deconditioning  11/1:  Will start PT/OT/ST to evaluate for any possible needs     Critical Care Myopathy      Coumadin toxicity  10/31:  INR elevated  Pharm to dose  Hold dose for today       Parkinson disease  10/29:  Resume home  meds      Panlobular emphysema  On nebs      Sepsis        Acute respiratory failure with hypoxia  10/29:  Likely 2/2 to aspiration PNA  Sputum culture pending  Blood culture pending  On vanc, cefepime, and flagyl  Continue to wean vent as tolerated   Reviewed blood gas     10/30:  Currently on vent   Sputum cultures pending  Continue current antibiotics   Wean vent as tolerated   Reviewed blood gas today  Will order procal for trend in am     S/p vent support, continue supportive care  Continue steroids      Metabolic acidosis with respiratory acidosis  improving      Atrial fibrillation with RVR  10/29:  Not on rate control medical medications at home  HR ok for now  On coumadin   Pharm to dose   Metoprolol IV PRN     10/30:  Rate controlled     10/31:  Controlled     11/1:  Rate controlled, coumadin managed by pharm     SOB (shortness of breath)          VTE Risk Mitigation (From admission, onward)         Ordered     warfarin (COUMADIN) tablet 2.5 mg  Daily      11/02/19 1041     Place sequential compression device  Until discontinued      10/29/19 1630     IP VTE HIGH RISK PATIENT  Once      10/29/19 1630                      Kalen Allan MD  Department of Hospital Medicine   [facility]

## 2019-11-02 NOTE — CONSULTS
[facility]  Cardiology  Consult Note    Patient Name: Shaq Huffman  MRN: [id number]  Admission Date: 10/29/2019  Hospital Length of Stay: 4 days  Code Status: DNR   Attending Provider: Kalen Allan MD   Consulting Provider: Columba Soriano PA-C  Primary Care Physician: Provider Notinsystem  Principal Problem:Aspiration pneumonia of right lower lobe due to gastric secretions    Patient information was obtained from patient, past medical records and ER records.     Inpatient consult to Cardiology  Consult performed by: Columba Soriano PA-C  Consult ordered by: Allan Guzman MD        Subjective:     Chief Complaint:  *Shortness of breath    HPI:   Mr. Huffman is an 86 year old male patient whose current medical conditions include Parkinson's disease, chronic afib (on Coumadin), GERD, and COPD who was transferred to [facility] from outside facility due to respiratory failure. Patient was on board a [place] [employer] cruise when he began to complain of progressively worsening SOB. Associated symptoms included coughing and lethargy. EMT was called and subsequently sent patient to ED. His respiratory status continued to decline and he was subsequently intubated. Initial workup in ED revealed troponin of 0.029>0.159, BNP of 353, lactic acidosis (2.7), and positive procalcitonin. CXR showed RLL infiltrate concerning for aspiration and coarse interstitial markings. EKG showed afib with RVR with HR in 150's and patient was subsequently admitted for further evaluation and treatment. Cardiology consulted to assist with management. Patient seen and examined today, sitting up in bed. Still complains of SOB. Tachypneic and in mild respiratory distress at time of exam. Remains chest pain free. He reports compliance with his medications. Chart reviewed. Troponin trending down. 2D echo showed EF of 25-40%, DD, and RVE with moderately depressed systolic function, mild AS.  INR 2.6.    Past Medical History:   Diagnosis  Date    A-fib     COPD (chronic obstructive pulmonary disease)        History reviewed. No pertinent surgical history.    Review of patient's allergies indicates:  No Known Allergies    No current facility-administered medications on file prior to encounter.      Current Outpatient Medications on File Prior to Encounter   Medication Sig    albuterol (PROVENTIL) 2.5 mg /3 mL (0.083 %) nebulizer solution Take 2.5 mg by nebulization every 6 (six) hours as needed for Wheezing. Rescue    brimonidine 0.15 % OPTH DROP (ALPHAGAN) 0.15 % ophthalmic solution 1 drop 3 (three) times daily.    brinzolamide/brimonidine tart (SIMBRINZA OPHT) Apply to eye.    carbidopa-levodopa  mg (SINEMET)  mg per tablet     fluticasone propionate (FLOVENT HFA) 110 mcg/actuation inhaler Inhale 1 puff into the lungs 2 (two) times daily. Controller    furosemide (LASIX) 20 MG tablet     lisinopril (PRINIVIL,ZESTRIL) 20 MG tablet     vit C/vit E ac/lut/copper/zinc (PRESERVISION LUTEIN ORAL) Take by mouth.    vitamin D (VITAMIN D3) 1000 units Tab Take 1,000 Units by mouth once daily.    warfarin (COUMADIN) 5 MG tablet     bevacizumab (AVASTIN) 2.5 mg/0.10 mL injection Inject into the eye.     Family History     None        Tobacco Use    Smoking status: Former Smoker   Substance and Sexual Activity    Alcohol use: Not on file    Drug use: Not on file    Sexual activity: Not on file     Review of Systems   Constitution: Positive for malaise/fatigue.   HENT: Negative.    Eyes: Negative.    Cardiovascular: Positive for dyspnea on exertion.   Respiratory: Positive for cough, shortness of breath and wheezing.    Endocrine: Negative.    Hematologic/Lymphatic: Bruises/bleeds easily.   Skin: Negative.    Musculoskeletal: Negative.    Gastrointestinal: Negative.    Genitourinary: Negative.    Neurological: Negative.    Psychiatric/Behavioral: Negative.    Allergic/Immunologic: Negative.      Objective:     Vital Signs (Most  Recent):  Temp: 98.7 °F (37.1 °C) (11/02/19 0744)  Pulse: (!) 113 (11/02/19 0747)  Resp: (!) 22 (11/02/19 0747)  BP: (!) 154/107 (11/02/19 0744)  SpO2: 95 % (11/02/19 0747) Vital Signs (24h Range):  Temp:  [98 °F (36.7 °C)-99.3 °F (37.4 °C)] 98.7 °F (37.1 °C)  Pulse:  [] 113  Resp:  [18-31] 22  SpO2:  [93 %-99 %] 95 %  BP: (128-158)/() 154/107     Weight: 77.9 kg (171 lb 11.8 oz)  Body mass index is 21.47 kg/m².    SpO2: 95 %  O2 Device (Oxygen Therapy): nasal cannula      Intake/Output Summary (Last 24 hours) at 11/2/2019 1140  Last data filed at 11/2/2019 0500  Gross per 24 hour   Intake 650 ml   Output 875 ml   Net -225 ml       Lines/Drains/Airways     Peripherally Inserted Central Catheter Line                 PICC Double Lumen 10/30/19 0445 left brachial 3 days          Drain                 NG/OG Tube 10/29/19 1200 [place] sump 16 Fr. Left nostril 3 days         Urethral Catheter 10/29/19 1440 16 Fr. 3 days                Physical Exam   Constitutional: He is oriented to person, place, and time. He appears well-developed and well-nourished. He appears distressed.   HENT:   Head: Normocephalic and atraumatic.   Eyes: Pupils are equal, round, and reactive to light. Right eye exhibits no discharge. Left eye exhibits no discharge.   Neck: Neck supple. No JVD present.   Cardiovascular: Irregularly irregular rate and rhythm; tachycardic : 1 normal and S2 normal. Exam reveals no friction rub.   Murmur heard.   Harsh midsystolic murmur is present at the upper right sternal border radiating to the neck.  Pulmonary/Chest: He is in respiratory distress. He has wheezes.   Coarse BS at bases   Abdominal: Soft.   Musculoskeletal: He exhibits no edema.   Neurological: He is alert and oriented to person, place, and time.   Skin: Skin is warm and dry. He is not diaphoretic. No erythema.   Psychiatric: He has a normal mood and affect. His behavior is normal. Thought content normal.   Nursing note and vitals  reviewed.      Significant Labs:   CMP   Recent Labs   Lab 11/01/19  0354 11/02/19  0342    145   K 3.9 4.0   * 115*   CO2 21* 22*   * 102   BUN 53* 50*   CREATININE 1.2 1.0   CALCIUM 9.1 10.0   ANIONGAP 8 8   ESTGFRAFRICA >60 >60   EGFRNONAA 54* >60   , CBC   Recent Labs   Lab 11/01/19  0354 11/02/19  0342   WBC 11.37 12.87*   HGB 11.4* 12.3*   HCT 34.5* 38.3*   * 131*   , Troponin No results for input(s): TROPONINI in the last 48 hours. and All pertinent lab results from the last 24 hours have been reviewed.    Significant Imaging: Echocardiogram: 2D echo with color flow doppler: No results found for this or any previous visit., EKG: Reviewed and X-Ray: CXR: X-Ray Chest 1 View (CXR):   Results for orders placed or performed during the hospital encounter of 10/29/19   X-Ray Chest 1 View    Narrative    EXAMINATION:  XR CHEST 1 VIEW    CLINICAL HISTORY:  Pneumonia.    COMPARISON:  Chest x-ray, 11/01/2019.    FINDINGS:  There is an NG tube with its tip in the body of the stomach.  There is a left-sided PICC with its tip in the SVC.  There is marked cardiomegaly.  There is extensive infiltrate throughout the right mid lung field and right lower lung field, unchanged.  Chronic coarsening of the bronchovascular interstitium is noted.      Impression    See above      Electronically signed by: Alex Carlos MD  Date:    11/02/2019  Time:    08:29    and X-Ray Chest PA and Lateral (CXR): No results found for this visit on 10/29/19.    Assessment and Plan:   Patient who presents with SOB/respiratory failure in setting of aspiration PNA, underlying lung conditions, and CHF. Diurese with Lasix and assess response. Continue nebs/steroids. Will further optimize meds pending clinical course. Will need ischemic evaluation once respiratory status improves.     * Aspiration pneumonia of right lower lobe due to gastric secretions  -Mgmt as per hospital medicine  -On abx    New onset of congestive heart  failure  -2D echo showed EF of 35-40%, DD, RVE with moderately depressed systolic function  -Diurese with IV Lasix 40 mg BID  -Continue BB, ACEi  -Will need ischemic evaluation once respiratory status improves      Parkinson disease  -Mgmt as per hospital medicine    Panlobular emphysema  -Mgmt as per hospital medicine  -+active wheezing and tachypneic during exam    Elevated troponin  -Troponin trending down  -Elevation secondary to demand ischemia from afib with RVR, PNA, possible sepsis, CHF  -Denies chest pain  -Continue BB, ACEi  -Consider ischemic evaluation pending clinical course/once respiratory status improved    Sepsis  -Mgmt as per hospital medicine  -On abx    Atrial fibrillation with RVR  -HR elevated in setting of respiratory distress/underlyling illness  -Continue Coreg for rate-control  -Up titrate if BP permits  -Continue Coumadin for CVA prophylaxis    SOB (shortness of breath)  -Multifactorial secondary to aspiration PNA, underlying lung conditions, and decompensated combined CHF  -Continue nebs/steroids  -Start IV Lasix 40 mg BID and assess response        VTE Risk Mitigation (From admission, onward)         Ordered     warfarin (COUMADIN) tablet 2.5 mg  Daily      11/02/19 1041     Place sequential compression device  Until discontinued      10/29/19 1630     IP VTE HIGH RISK PATIENT  Once      10/29/19 1630                Thank you for your consult. I will follow-up with patient. Please contact us if you have any additional questions.    Columba Soriano PA-C  Cardiology   [facility]

## 2019-11-02 NOTE — PROGRESS NOTES
Pharmacy Coumadin Consult Note    INR=2.6(down from 4.2)  Hx of Afib  Goal INR=2-3    Patient's home dose was 5mg daily. Currently on tube feedings. INR jumped with 5mg dose.   Will decrease patient's dose to Warfarin 2.5mg daily.     Daily INR ordered.   Patient needs to be educated.     Pharmacy will monitor daily INR levels and make dosage adjustments when necessary.   Thank you for allowing us to participate in this patient's care.  Destiny Dan, Pharm D 11/2/2019 10:46 AM

## 2019-11-02 NOTE — ASSESSMENT & PLAN NOTE
-HR elevated in setting of respiratory distress/underlyling illness  -Continue Coreg for rate-control  -Up titrate if BP permits  -Continue Coumadin for CVA prophylaxis

## 2019-11-02 NOTE — HPI
Mr. Huffman is an 86 year old male patient whose current medical conditions include Parkinson's disease, chronic afib (on Coumadin), GERD, and COPD who was transferred to [facility] from outside facility due to respiratory failure. Patient was on board a [place] [employer] [place] when he began to complain of progressively worsening SOB. Associated symptoms included coughing and lethargy. EMT was called and subsequently sent patient to ED. His respiratory status continued to decline and he was subsequently intubated. Initial workup in ED revealed troponin of 0.029>0.159, BNP of 353, lactic acidosis (2.7), and positive procalcitonin. CXR showed RLL infiltrate concerning for aspiration and coarse interstitial markings. EKG showed afib with RVR with HR in 150's and patient was subsequently admitted for further evaluation and treatment. Cardiology consulted to assist with management. Patient seen and examined today, sitting up in bed. Still complains of SOB. Tachypneic and in mild respiratory distress at time of exam. Remains chest pain free. He reports compliance with his medications. Chart reviewed. Troponin trending down. 2D echo showed EF of 25-40%, DD, and RVE with moderately depressed systolic function, mild AS.  INR 2.6.

## 2019-11-02 NOTE — PT/OT/SLP PROGRESS
Speech Language Pathology Treatment    Patient Name:  Shaq Huffman   MRN:  [id number]  Admitting Diagnosis: Aspiration pneumonia of right lower lobe due to gastric secretions    Recommendations:                 General Recommendations:  Dysphagia therapy  Diet recommendations:  NPO, Liquid Diet Level: NPO   Aspiration Precautions: Continue alternate means of nutrition   General Precautions: Standard, aspiration, respiratory  Communication strategies:  provide increased time to answer    Subjective     Pt seen at bedside with family present    Pain/Comfort:  · Pain Rating 1: 0/10  · Pain Rating Post-Intervention 1: 0/10    Objective:     Has the patient been evaluated by SLP for swallowing?      Keep patient NPO?     Current Respiratory Status: nasal cannula      Pt with short labored breathing patterns. Oral care provided for hydration, sensation, and generation of spontaneous swallow reflex. Pt generated a swallow with thermal and tactile cues. Pt with extended swallow delay upon manual palpitation of swallow. Observed po intake of ice chips and drop of water via straw. Pt with inconsistent swallow response. Recommend NPO with continued alternate source of nutrition    Assessment:     Shaq Huffman is a 86 y.o. male with an SLP diagnosis of Dysphagia.  He presents with oropharyngeal dysphagia.    Goals:   Multidisciplinary Problems     SLP Goals        Problem: SLP Goal    Goal Priority Disciplines Outcome   SLP Goal     SLP Ongoing, Progressing   Description:  1. Pt will complete oral and pharyngeal exercises with min A for increased strength and ROM  2. Pt will tolerate least restrictive diet without incidence and while maintaining airway integrity                     Plan:     · Patient to be seen:  3 x/week   · Plan of Care expires:  11/08/19  · Plan of Care reviewed with:  patient, son, significant other   · SLP Follow-Up:  Yes       Discharge recommendations:  nursing facility, skilled   Barriers to  Discharge:  Level of Skilled Assistance Needed mod/max    Time Tracking:     SLP Treatment Date:   11/02/19  Speech Start Time:  0954  Speech Stop Time:  1025     Speech Total Time (min):  31 min    Billable Minutes: Treatment Swallowing Dysfunction 20    Betty Chun CCC-SLP  11/02/2019

## 2019-11-02 NOTE — PROGRESS NOTES
Patient wheezing before and after breatjomg  Treatment , using abdominal muscles to breath . Mouth breather

## 2019-11-02 NOTE — PLAN OF CARE
POC reviewed with pt. Verbalized understanding. Pt remained free from falls, fall precautions in place, pt is on tele monitor see shift rounds. No other c/o at this time, PIV intact. Call bell and personal belongings within reach. Hourly rounding completed. Instructed pt to call for assistance as needed. Will continue to monitor.

## 2019-11-02 NOTE — SIGNIFICANT EVENT
Notified by nursing staff that patient became tachypneic with RR 28-30 and tachycardic with HR in low 110s.  O2 sat remains stable at 91-93% on 4L NC.  Patient seen examined, diffuse bilateral expiratory wheezes and increased WOB noted.  Duoneb treatment given with minimal improvement in wheezes and RR.  Daily CXR pending.  Will give 125 mg IV Solumedrol x 1 dose now.            Karla Baxter, NP

## 2019-11-02 NOTE — ASSESSMENT & PLAN NOTE
10/29:  Likely 2/2 to aspiration PNA  Sputum culture pending  Blood culture pending  On vanc, cefepime, and flagyl  Continue to wean vent as tolerated   Reviewed blood gas     10/30:  Currently on vent   Sputum cultures pending  Continue current antibiotics   Wean vent as tolerated   Reviewed blood gas today  Will order procal for trend in am     S/p vent support, continue supportive care  Continue steroids

## 2019-11-02 NOTE — ASSESSMENT & PLAN NOTE
-Troponin trending down  -Elevation secondary to demand ischemia from afib with RVR, PNA, possible sepsis, CHF  -Denies chest pain  -Continue BB, ACEi  -Consider ischemic evaluation pending clinical course/once respiratory status improved

## 2019-11-02 NOTE — SUBJECTIVE & OBJECTIVE
Interval History: Frail elderly man lying in bed, appears sick. NC and NGT in place, getting TF. CXR shows large R sided pneumonia, no dentures at present. WBC high with L shift. Bun up to 50.     Review of Systems   Unable to perform ROS: Acuity of condition   Constitutional: Negative for fatigue and fever.   Respiratory: Negative for shortness of breath.    Cardiovascular: Negative for chest pain.   Gastrointestinal: Negative for nausea and vomiting.   Skin: Negative for rash.     Objective:     Vital Signs (Most Recent):  Temp: 97.8 °F (36.6 °C) (11/02/19 1519)  Pulse: 104 (11/02/19 1546)  Resp: 18 (11/02/19 1519)  BP: 139/87 (11/02/19 1519)  SpO2: (!) 92 % (11/02/19 1519) Vital Signs (24h Range):  Temp:  [97.4 °F (36.3 °C)-99.3 °F (37.4 °C)] 97.8 °F (36.6 °C)  Pulse:  [] 104  Resp:  [17-28] 18  SpO2:  [92 %-98 %] 92 %  BP: (128-166)/() 139/87     Weight: 77.9 kg (171 lb 11.8 oz)  Body mass index is 21.47 kg/m².    Intake/Output Summary (Last 24 hours) at 11/2/2019 1730  Last data filed at 11/2/2019 0740  Gross per 24 hour   Intake 120 ml   Output 500 ml   Net -380 ml      Physical Exam   Constitutional: He appears lethargic. He has a sickly appearance. No distress. Nasal cannula in place.   Frail elderly man appears sick   HENT:   Head: Normocephalic and atraumatic.   edentulous   Eyes: Pupils are equal, round, and reactive to light. Conjunctivae and EOM are normal.   Neck: Normal range of motion. Neck supple.   Cardiovascular: Exam reveals no gallop and no friction rub.   No murmur heard.  Irregular irregular   Pulmonary/Chest: No stridor. He is in respiratory distress. He has no wheezes. He has rales.   Abdominal: Soft. Bowel sounds are normal. He exhibits no distension and no mass. There is no tenderness. There is no guarding.   Genitourinary:   Genitourinary Comments: deferred   Musculoskeletal: He exhibits no edema.   Neurological: He appears lethargic.   Skin: Skin is warm. No rash noted. He is  not diaphoretic.   Nursing note and vitals reviewed.      Significant Labs:   ABGs:   Blood Culture:   BMP:   Recent Labs   Lab 11/02/19 0342         K 4.0   *   CO2 22*   BUN 50*   CREATININE 1.0   CALCIUM 10.0   MG 1.8     CBC:   Recent Labs   Lab 11/01/19 0354 11/02/19 0342   WBC 11.37 12.87*   HGB 11.4* 12.3*   HCT 34.5* 38.3*   * 131*     CMP:   Recent Labs   Lab 11/01/19 0354 11/02/19 0342    145   K 3.9 4.0   * 115*   CO2 21* 22*   * 102   BUN 53* 50*   CREATININE 1.2 1.0   CALCIUM 9.1 10.0   ANIONGAP 8 8   EGFRNONAA 54* >60     Coagulation:   Recent Labs   Lab 11/02/19 0342   INR 2.6*     Magnesium:   Recent Labs   Lab 11/01/19 0354 11/02/19 0342   MG 2.0 1.8     Pathology Results  (Last 10 years)    None        Respiratory Culture:   Urine Culture:   All pertinent labs within the past 24 hours have been reviewed.    Significant Imaging: I have reviewed all pertinent imaging results/findings within the past 24 hours.

## 2019-11-02 NOTE — PT/OT/SLP EVAL
Occupational Therapy   Evaluation    Name: Shaq Huffman  MRN: [id number]  Admitting Diagnosis:  Aspiration pneumonia of right lower lobe due to gastric secretions      Recommendations:     Discharge Recommendations: rehabilitation facility, nursing facility, skilled(depending on progress with therapy)  Discharge Equipment Recommendations:  (TBD)  Barriers to discharge:       Assessment:     Shaq Huffman is a 86 y.o. male with a medical diagnosis of Aspiration pneumonia of right lower lobe due to gastric secretions.  He presents with impaired functional mobility and ADLs. Performance deficits affecting function: weakness, impaired endurance, gait instability, impaired functional mobilty, impaired self care skills, impaired balance, decreased upper extremity function, decreased coordination, decreased safety awareness.      Rehab Prognosis: Good; patient would benefit from acute skilled OT services to address these deficits and reach maximum level of function.       Plan:     Patient to be seen 3 x/week to address the above listed problems via self-care/home management, therapeutic activities, therapeutic exercises  · Plan of Care Expires: 11/09/19  · Plan of Care Reviewed with: patient, son    Subjective     Chief Complaint: weakness   Patient/Family Comments/goals: to get stronger    Occupational Profile:  Living Environment: lives with girlfriend in 1 story house with 2 steps and no rail  Previous level of function: (I) with ADLs and ambulated at times with SPC  Roles and Routines: drives  Equipment Used at Home:  cane, straight  Assistance upon Discharge: Girlfriend?    Pain/Comfort:  · Pain Rating 1: 0/10    Patients cultural, spiritual, Rastafarian conflicts given the current situation:      Objective:     Communicated with: Nurse Mayen and epic chart review prior to session.  Patient found supine with NG Tube, telemetry, peripheral IV, oxygen, pressure relief boots, bed alarm upon OT entry to  room.    General Precautions: Standard, aspiration, respiratory, fall   Orthopedic Precautions:N/A   Braces: N/A     Occupational Performance:    Bed Mobility:    · Patient completed Rolling/Turning to Left with  maximal assistance  · Patient completed Rolling/Turning to Right with maximal assistance  · Patient completed Scooting/Bridging with maximal assistance  · Patient completed Supine to Sit with maximal assistance and 2 persons  · Patient completed Sit to Supine with maximal assistance and 2 persons    Functional Mobility/Transfers:  · Patient completed Sit <> Stand Transfer with moderate assistance and of 2 persons  with  rolling walker   · Functional Mobility: several side steps toward head of bed using RW with Max A x 2    Activities of Daily Living:  · Feeding:  total assistance has NG tube  · Upper Body Dressing: maximal assistance .  · Lower Body Dressing: total assistance .  · Toileting: total assistance zan    Cognitive/Visual Perceptual:  Cognitive/Psychosocial Skills:     -       Oriented to: Person, Place, Time and Situation   -       Follows Commands/attention:Follows two-step commands  -       Communication: pt receiving breathing treatment therefore would mouth words to therapist  -       Memory: No Deficits noted  -       Safety awareness/insight to disability: impaired   Visual/Perceptual:      -Intact .    Physical Exam:  Dominant hand:    -       right  Upper Extremity Range of Motion:     -       Right Upper Extremity: WFL  -       Left Upper Extremity: WFL  Upper Extremity Strength:    -       Right Upper Extremity: 4/5 grossly  -       Left Upper Extremity: 4/5 grossly   Strength:    -       Right Upper Extremity: WFL  -       Left Upper Extremity: WFL    AMPAC 6 Click ADL:  AMPAC Total Score: 8    Treatment & Education:  OT Eval completed as listed above. Pt educated in role of OT and POC.  Education:    Patient left HOB elevated with all lines intact, call button in reach, bed  alarm on, Nurse george notified and son present    GOALS:   Multidisciplinary Problems     Occupational Therapy Goals        Problem: Occupational Therapy Goal    Goal Priority Disciplines Outcome Interventions   Occupational Therapy Goal     OT, PT/OT     Description:  LTGs to be met by 11/9/19  1. Pt will perform BSC t/f with Mod A  2. Pt will perform LE dressing with Mod A  3. Pt will perform UE dressing with Min A  4. Pt will perform (B) UE ROM exercises 1 x 20 reps                    History:     Past Medical History:   Diagnosis Date    A-fib     COPD (chronic obstructive pulmonary disease)        History reviewed. No pertinent surgical history.    Time Tracking:     OT Date of Treatment: 11/02/19  OT Start Time: 0750  OT Stop Time: 0815  OT Total Time (min): 25 min    Billable Minutes:Evaluation 15 min  Therapeutic Activity 10 min    Kalyn Jose, PT/OT  11/2/2019

## 2019-11-02 NOTE — ASSESSMENT & PLAN NOTE
-Multifactorial secondary to aspiration PNA, underlying lung conditions, and decompensated combined CHF  -Continue nebs/steroids  -Start IV Lasix 40 mg BID and assess response

## 2019-11-02 NOTE — ASSESSMENT & PLAN NOTE
10/31:  Continue vanc, cefepime, and flagyl  procalcitonin trending up  WBC within normal limits  Chest xray ordered  Continue to monitor for response  Repeat procalcitonin in am  Sputum culture normal mikel    11/1:  Currently on vanc, cefepime and flagyl   procalcitonin repeated today trending down   Continue current abx  Plan to dc vanc in am   Continue to monitor procal for response   Blood cultures no growth  Will start PT/OT prior to discharge  Discussion was had with son who states he can come down and drive patient up to the [facility]   Informed son that if patient is stable for discharge he can likely fly home    Looks quiet sick and frail, still has congestion, very weak and easily dyspneic  Continue present care  Prognosis guarded to poor.

## 2019-11-03 PROBLEM — A41.9 SEPSIS: Status: RESOLVED | Noted: 2019-10-29 | Resolved: 2019-11-03

## 2019-11-03 PROBLEM — R79.89 ELEVATED TROPONIN: Status: RESOLVED | Noted: 2019-10-29 | Resolved: 2019-11-03

## 2019-11-03 PROBLEM — T45.511A COUMADIN TOXICITY: Status: RESOLVED | Noted: 2019-10-31 | Resolved: 2019-11-03

## 2019-11-03 PROBLEM — R06.02 SOB (SHORTNESS OF BREATH): Status: RESOLVED | Noted: 2019-10-29 | Resolved: 2019-11-03

## 2019-11-03 LAB
ANION GAP SERPL CALC-SCNC: 12 MMOL/L (ref 8–16)
BACTERIA BLD CULT: NORMAL
BACTERIA BLD CULT: NORMAL
BASOPHILS # BLD AUTO: 0.04 K/UL (ref 0–0.2)
BASOPHILS NFR BLD: 0.4 % (ref 0–1.9)
BUN SERPL-MCNC: 66 MG/DL (ref 8–23)
CALCIUM SERPL-MCNC: 9.9 MG/DL (ref 8.7–10.5)
CHLORIDE SERPL-SCNC: 111 MMOL/L (ref 95–110)
CO2 SERPL-SCNC: 25 MMOL/L (ref 23–29)
CREAT SERPL-MCNC: 1.1 MG/DL (ref 0.5–1.4)
DIFFERENTIAL METHOD: ABNORMAL
EOSINOPHIL # BLD AUTO: 0 K/UL (ref 0–0.5)
EOSINOPHIL NFR BLD: 0.1 % (ref 0–8)
ERYTHROCYTE [DISTWIDTH] IN BLOOD BY AUTOMATED COUNT: 14 % (ref 11.5–14.5)
EST. GFR  (AFRICAN AMERICAN): >60 ML/MIN/1.73 M^2
EST. GFR  (NON AFRICAN AMERICAN): >60 ML/MIN/1.73 M^2
GLUCOSE SERPL-MCNC: 123 MG/DL (ref 70–110)
HCT VFR BLD AUTO: 40 % (ref 40–54)
HGB BLD-MCNC: 13.3 G/DL (ref 14–18)
IMM GRANULOCYTES # BLD AUTO: 0.19 K/UL (ref 0–0.04)
IMM GRANULOCYTES NFR BLD AUTO: 1.8 % (ref 0–0.5)
INR PPP: 2.2 (ref 0.8–1.2)
LYMPHOCYTES # BLD AUTO: 0.6 K/UL (ref 1–4.8)
LYMPHOCYTES NFR BLD: 5.5 % (ref 18–48)
MAGNESIUM SERPL-MCNC: 1.9 MG/DL (ref 1.6–2.6)
MCH RBC QN AUTO: 32.3 PG (ref 27–31)
MCHC RBC AUTO-ENTMCNC: 33.3 G/DL (ref 32–36)
MCV RBC AUTO: 97 FL (ref 82–98)
MONOCYTES # BLD AUTO: 0.8 K/UL (ref 0.3–1)
MONOCYTES NFR BLD: 7.7 % (ref 4–15)
NEUTROPHILS # BLD AUTO: 9 K/UL (ref 1.8–7.7)
NEUTROPHILS NFR BLD: 84.5 % (ref 38–73)
NRBC BLD-RTO: 0 /100 WBC
PLATELET # BLD AUTO: 153 K/UL (ref 150–350)
PMV BLD AUTO: 10.2 FL (ref 9.2–12.9)
POTASSIUM SERPL-SCNC: 3.3 MMOL/L (ref 3.5–5.1)
PROTHROMBIN TIME: 22.8 SEC (ref 9–12.5)
RBC # BLD AUTO: 4.12 M/UL (ref 4.6–6.2)
SODIUM SERPL-SCNC: 148 MMOL/L (ref 136–145)
WBC # BLD AUTO: 10.64 K/UL (ref 3.9–12.7)

## 2019-11-03 PROCEDURE — 25000003 PHARM REV CODE 250: Performed by: NURSE PRACTITIONER

## 2019-11-03 PROCEDURE — 21400001 HC TELEMETRY ROOM

## 2019-11-03 PROCEDURE — 80048 BASIC METABOLIC PNL TOTAL CA: CPT

## 2019-11-03 PROCEDURE — 36415 COLL VENOUS BLD VENIPUNCTURE: CPT

## 2019-11-03 PROCEDURE — 25000003 PHARM REV CODE 250: Performed by: EMERGENCY MEDICINE

## 2019-11-03 PROCEDURE — 99233 SBSQ HOSP IP/OBS HIGH 50: CPT | Mod: ,,, | Performed by: INTERNAL MEDICINE

## 2019-11-03 PROCEDURE — 92526 ORAL FUNCTION THERAPY: CPT

## 2019-11-03 PROCEDURE — S0030 INJECTION, METRONIDAZOLE: HCPCS | Performed by: NURSE PRACTITIONER

## 2019-11-03 PROCEDURE — 94761 N-INVAS EAR/PLS OXIMETRY MLT: CPT

## 2019-11-03 PROCEDURE — 25000242 PHARM REV CODE 250 ALT 637 W/ HCPCS: Performed by: NURSE PRACTITIONER

## 2019-11-03 PROCEDURE — 99900035 HC TECH TIME PER 15 MIN (STAT)

## 2019-11-03 PROCEDURE — 25000003 PHARM REV CODE 250: Performed by: INTERNAL MEDICINE

## 2019-11-03 PROCEDURE — 63600175 PHARM REV CODE 636 W HCPCS: Performed by: INTERNAL MEDICINE

## 2019-11-03 PROCEDURE — 63600175 PHARM REV CODE 636 W HCPCS: Performed by: NURSE PRACTITIONER

## 2019-11-03 PROCEDURE — 97530 THERAPEUTIC ACTIVITIES: CPT

## 2019-11-03 PROCEDURE — 85025 COMPLETE CBC W/AUTO DIFF WBC: CPT

## 2019-11-03 PROCEDURE — A4216 STERILE WATER/SALINE, 10 ML: HCPCS | Performed by: NURSE PRACTITIONER

## 2019-11-03 PROCEDURE — 83735 ASSAY OF MAGNESIUM: CPT

## 2019-11-03 PROCEDURE — 63600175 PHARM REV CODE 636 W HCPCS: Performed by: EMERGENCY MEDICINE

## 2019-11-03 PROCEDURE — 94640 AIRWAY INHALATION TREATMENT: CPT

## 2019-11-03 PROCEDURE — 97116 GAIT TRAINING THERAPY: CPT

## 2019-11-03 PROCEDURE — 27000221 HC OXYGEN, UP TO 24 HOURS

## 2019-11-03 PROCEDURE — 99233 PR SUBSEQUENT HOSPITAL CARE,LEVL III: ICD-10-PCS | Mod: ,,, | Performed by: INTERNAL MEDICINE

## 2019-11-03 PROCEDURE — 85610 PROTHROMBIN TIME: CPT

## 2019-11-03 RX ORDER — FAMOTIDINE 20 MG/1
20 TABLET, FILM COATED ORAL 2 TIMES DAILY
Status: DISCONTINUED | OUTPATIENT
Start: 2019-11-03 | End: 2019-11-07 | Stop reason: HOSPADM

## 2019-11-03 RX ORDER — FUROSEMIDE 10 MG/ML
20 INJECTION INTRAMUSCULAR; INTRAVENOUS 2 TIMES DAILY
Status: DISCONTINUED | OUTPATIENT
Start: 2019-11-03 | End: 2019-11-04

## 2019-11-03 RX ORDER — FUROSEMIDE 10 MG/ML
40 INJECTION INTRAMUSCULAR; INTRAVENOUS DAILY
Status: DISCONTINUED | OUTPATIENT
Start: 2019-11-04 | End: 2019-11-03

## 2019-11-03 RX ADMIN — BUDESONIDE 0.5 MG: 0.5 INHALANT RESPIRATORY (INHALATION) at 07:11

## 2019-11-03 RX ADMIN — WARFARIN SODIUM 2.5 MG: 2.5 TABLET ORAL at 05:11

## 2019-11-03 RX ADMIN — IPRATROPIUM BROMIDE AND ALBUTEROL SULFATE 3 ML: .5; 3 SOLUTION RESPIRATORY (INHALATION) at 01:11

## 2019-11-03 RX ADMIN — FAMOTIDINE 20 MG: 20 TABLET ORAL at 09:11

## 2019-11-03 RX ADMIN — Medication 10 ML: at 06:11

## 2019-11-03 RX ADMIN — FUROSEMIDE 20 MG: 10 INJECTION, SOLUTION INTRAMUSCULAR; INTRAVENOUS at 05:11

## 2019-11-03 RX ADMIN — CEFEPIME HYDROCHLORIDE 2 G: 2 INJECTION, SOLUTION INTRAVENOUS at 05:11

## 2019-11-03 RX ADMIN — Medication 10 ML: at 12:11

## 2019-11-03 RX ADMIN — FUROSEMIDE 40 MG: 10 INJECTION, SOLUTION INTRAMUSCULAR; INTRAVENOUS at 09:11

## 2019-11-03 RX ADMIN — CARBIDOPA AND LEVODOPA 1 TABLET: 25; 100 TABLET ORAL at 08:11

## 2019-11-03 RX ADMIN — METRONIDAZOLE 500 MG: 500 INJECTION, SOLUTION INTRAVENOUS at 01:11

## 2019-11-03 RX ADMIN — MELATONIN 1000 UNITS: at 09:11

## 2019-11-03 RX ADMIN — IPRATROPIUM BROMIDE AND ALBUTEROL SULFATE 3 ML: .5; 3 SOLUTION RESPIRATORY (INHALATION) at 07:11

## 2019-11-03 RX ADMIN — CARBIDOPA AND LEVODOPA 1 TABLET: 25; 100 TABLET ORAL at 03:11

## 2019-11-03 RX ADMIN — Medication 10 ML: at 01:11

## 2019-11-03 RX ADMIN — ARFORMOTEROL TARTRATE 15 MCG: 15 SOLUTION RESPIRATORY (INHALATION) at 08:11

## 2019-11-03 RX ADMIN — METRONIDAZOLE 500 MG: 500 INJECTION, SOLUTION INTRAVENOUS at 09:11

## 2019-11-03 RX ADMIN — METRONIDAZOLE 500 MG: 500 INJECTION, SOLUTION INTRAVENOUS at 06:11

## 2019-11-03 RX ADMIN — CARBIDOPA AND LEVODOPA 1 TABLET: 25; 100 TABLET ORAL at 09:11

## 2019-11-03 RX ADMIN — CARVEDILOL 6.25 MG: 6.25 TABLET, FILM COATED ORAL at 08:11

## 2019-11-03 RX ADMIN — LISINOPRIL 20 MG: 20 TABLET ORAL at 09:11

## 2019-11-03 RX ADMIN — FAMOTIDINE 20 MG: 20 TABLET ORAL at 08:11

## 2019-11-03 RX ADMIN — ARFORMOTEROL TARTRATE 15 MCG: 15 SOLUTION RESPIRATORY (INHALATION) at 07:11

## 2019-11-03 RX ADMIN — CARVEDILOL 6.25 MG: 6.25 TABLET, FILM COATED ORAL at 09:11

## 2019-11-03 NOTE — PT/OT/SLP PROGRESS
Physical Therapy  Treatment    Shaq Huffman   MRN: [id number]   Admitting Diagnosis: Aspiration pneumonia of right lower lobe due to gastric secretions    PT Received On: 11/03/19  PT Start Time: 1100     PT Stop Time: 1130    PT Total Time (min): 30 min       Billable Minutes:  Gait Training 15 and Therapeutic Activity 10    Treatment Type: Treatment  PT/PTA: PTA     PTA Visit Number: 1       General Precautions: Standard, fall, aspiration, respiratory  Orthopedic Precautions: N/A   Braces: N/A         Subjective:  Communicated with [facility] and nurse Mayen prior to session. ( and after)  Pt agreed to tx. Reports that he feels better today. He is eager for tx.         Pain/Comfort  Pain Rating 1: 0/10    Objective:   Patient found with: bed alarm, zuluaga catheter, NG tube, oxygen, peripheral IV, SCD, telemetry, 3 visitors in room. Nasal cannula on but flow meter turn off. Nsg notified.     Functional Mobility:  Bed Mobility:     Supine to sit: MOD A   Sit to supine: MOD A    Transfers:    Sit to stand: min a   Stand to sit: min a    Gait:     3 feet fwd, 3 feet back and and rested then sidestepped 2 feet , used a RW and  MIN A. Oxygen donned at 2 lpm. Sats 87-91% through out session.    Balance:   Sat eob with CGA initially. sba by the end of the session.   Static standing with the RW x 1 min for strengthening.         Therapeutic Activities and Exercises:  reviewed and pt cross demonstrated supine there ex: ankle pumps , quad sets, glute sets. And some UE rom      AM-PAC 6 CLICK MOBILITY  How much help from another person does this patient currently need?   1 = Unable, Total/Dependent Assistance  2 = A lot, Maximum/Moderate Assistance  3 = A little, Minimum/Contact Guard/Supervision  4 = None, Modified [place]/Independent    Turning over in bed (including adjusting bedclothes, sheets and blankets)?: 3  Sitting down on and standing up from a chair with arms (e.g., wheelchair, bedside commode, etc.): 3  Moving from  lying on back to sitting on the side of the bed?: 2  Moving to and from a bed to a chair (including a wheelchair)?: 3  Need to walk in hospital room?: 3  Climbing 3-5 steps with a railing?: 1  Basic Mobility Total Score: 15    AM-PAC Raw Score CMS G-Code Modifier Level of Impairment Assistance   6 % Total / Unable   7 - 9 CM 80 - 100% Maximal Assist   10 - 14 CL 60 - 80% Moderate Assist   15 - 19 CK 40 - 60% Moderate Assist   20 - 22 CJ 20 - 40% Minimal Assist   23 CI 1-20% SBA / CGA   24 CH 0% Independent/ Mod I     Patient left HOB elevated with all lines intact, call button in reach, bed alarm on and Nurse and other visitiors present.    Assessment:  Shaq Huffman is a 86 y.o. male with a medical diagnosis of Aspiration pneumonia of right lower lobe due to gastric secretions and presents with improved mobility today. Greatly limited by external restraints: iv lines etc. And SOB with mobility. Motivated and cooperative.     Rehab identified problem list/impairments: Rehab identified problem list/impairments: weakness, impaired balance, impaired self care skills, decreased safety awareness, decreased ROM, impaired skin, impaired joint extensibility, pain, decreased upper extremity function, impaired functional mobilty, impaired endurance, impaired sensation, gait instability, decreased lower extremity function, impaired fine motor, impaired cardiopulmonary response to activity, impaired muscle length    Rehab potential is fair.    Activity tolerance: Poor    Discharge recommendations: Discharge Facility/Level of Care Needs: nursing facility, skilled     Barriers to discharge:      Equipment recommendations:       GOALS:   Multidisciplinary Problems     Physical Therapy Goals        Problem: Physical Therapy Goal    Goal Priority Disciplines Outcome Goal Variances Interventions   Physical Therapy Goal     PT, PT/OT Ongoing, Progressing     Description:  1. Patient will perform supine to/from sit mod A  2.  Patient will perform sit to/from stand with RW mod A  3. Patient will amb 25ft RW mod A                    PLAN:    Patient to be seen 5 x/week  to address the above listed problems via gait training, therapeutic activities, therapeutic exercises  Plan of Care expires: 11/09/19  Plan of Care reviewed with: patient, other (see comments)(visitors in room)         Yenifer Smallwood, ZAHIDA  11/03/2019

## 2019-11-03 NOTE — PROGRESS NOTES
Pharmacy Coumadin Consult Note    INR=2.2 (down from 2.6)  Hx of AFib  Goal INR=2-3    Patient's home dose was 5mg MWF and 2.5mg all other days.  Due to jump when administering 5mg dose, changed current dose to 2.5mg daily. This can be continued.    Patient was educated.  Daily INR ordered.  Pharmacy is consulted to dose    Pharmacy will monitor daily INR levels and make dosage adjustments when necessary.   Thank you for allowing us to participate in this patient's care.  Destiny Dan, Pharm D 11/3/2019 7:56 AM

## 2019-11-03 NOTE — PLAN OF CARE
Pt stood with MIN A and took steps away from the bed ~3 feet. Utilized a RW. Limited by SOB , on 2 lpm with sats ranging 87-92%.

## 2019-11-03 NOTE — SUBJECTIVE & OBJECTIVE
Interval History: looks lot better, more alert and responsive. Talking, getting TF via NGT, tolerating it well. Did PT/OT and moving all 4 ext in bed. Bun increased-- hence lasix decreased and free water increased via NGT. Hopefully will be able to take oral food in a day or two.     Review of Systems   Unable to perform ROS: Acuity of condition   Constitutional: Positive for activity change and appetite change. Negative for fatigue and fever.   HENT: Positive for congestion.    Eyes: Negative.    Respiratory: Positive for cough and shortness of breath.    Cardiovascular: Negative for palpitations and leg swelling.   Gastrointestinal: Negative.  Negative for nausea and vomiting.   Endocrine: Negative.    Genitourinary: Negative.    Musculoskeletal: Positive for gait problem.   Skin: Negative for rash.   Neurological: Positive for weakness.     Objective:     Vital Signs (Most Recent):  Temp: 97.5 °F (36.4 °C) (11/03/19 1220)  Pulse: 88 (11/03/19 1334)  Resp: 20 (11/03/19 1305)  BP: 125/84 (11/03/19 1220)  SpO2: (!) 94 % (11/03/19 1305) Vital Signs (24h Range):  Temp:  [96.8 °F (36 °C)-97.9 °F (36.6 °C)] 97.5 °F (36.4 °C)  Pulse:  [] 88  Resp:  [18-22] 20  SpO2:  [91 %-96 %] 94 %  BP: (125-155)/(84-93) 125/84     Weight: 77.5 kg (170 lb 13.7 oz)  Body mass index is 21.36 kg/m².    Intake/Output Summary (Last 24 hours) at 11/3/2019 1536  Last data filed at 11/3/2019 0600  Gross per 24 hour   Intake 1910.07 ml   Output 4375 ml   Net -2464.93 ml      Physical Exam   Constitutional: He appears lethargic. He has a sickly appearance. No distress. Nasal cannula in place.   Frail elderly man appears sick   HENT:   Head: Normocephalic and atraumatic.   edentulous   Eyes: Pupils are equal, round, and reactive to light. Conjunctivae and EOM are normal.   Neck: Normal range of motion. Neck supple.   Cardiovascular: Exam reveals no gallop and no friction rub.   No murmur heard.  Irregular irregular   Pulmonary/Chest: No  stridor. No respiratory distress. He has no wheezes. He has rales.   Abdominal: Soft. Bowel sounds are normal. He exhibits no distension and no mass. There is no tenderness. There is no guarding.   Genitourinary:   Genitourinary Comments: deferred   Musculoskeletal: He exhibits no edema.   Neurological: He appears lethargic.   Skin: Skin is warm. No rash noted. He is not diaphoretic.   Nursing note and vitals reviewed.      Significant Labs:   BMP:   Recent Labs   Lab 11/03/19  0445   *   *   K 3.3*   *   CO2 25   BUN 66*   CREATININE 1.1   CALCIUM 9.9   MG 1.9     CBC:   Recent Labs   Lab 11/02/19  0342 11/03/19  0445   WBC 12.87* 10.64   HGB 12.3* 13.3*   HCT 38.3* 40.0   * 153     Respiratory Culture:   All pertinent labs within the past 24 hours have been reviewed.    Significant Imaging: I have reviewed all pertinent imaging results/findings within the past 24 hours.

## 2019-11-03 NOTE — HOSPITAL COURSE
11/3/19-Patient seen and examined today. Feeling better, less SOB. More responsive. No chest pain. Good diuresis overnight. Labs reviewed. Creatinine stable, BUN, 66. K-3.3. INR 2.2.     11/4/19-Patient seen and examined today. Improving. SOB better. Worked with PT/OT this AM. No chest pain.  Labs reviewed. Creatinine 1.0, BUN 77, Na 148, K 3.1. INR 2.2.    11/5/19-patient OOB in chair this morning. Ambulated some with PT today. NGT has been DC'd. Still having some SOB this morning. Has no chest pain.     11/6/19- Uneventful night. Working with ST this morning. No angina or equivalent

## 2019-11-03 NOTE — ASSESSMENT & PLAN NOTE
-HR elevated in setting of respiratory distress/underlyling illness  -Continue Coreg for rate-control  -Up titrate if BP permits  -Continue Coumadin for CVA prophylaxis    11/3/19  -Remains in afib, rate-controlled  -Continue BB  -Continue Coumadin, dosing as per pharmacy

## 2019-11-03 NOTE — PROGRESS NOTES
[facility] Hospital Medicine  Progress Note    Patient Name: Shaq Huffman  MRN: [id number]  Patient Class: IP- Inpatient   Admission Date: 10/29/2019  Length of Stay: 5 days  Attending Physician: Kalen Allan MD  Primary Care Provider: Provider Notinsystem        Subjective:     Principal Problem:Aspiration pneumonia of right lower lobe due to gastric secretions        HPI:  Patient is an 85 y/o male with a PMH of parkinsons, afib, GERD, COPD presents as transfer from [facility] for respiratory failure. Patient was intubated prior to arrival and history was obtained by partner. She reports her and patient were on a cruise leaving out of [place] and patient developed some breathing difficulty. Cruise healthcare professionals were called patient was taken to [facility]. He was intubated there due to respiratory insufficiency with hypercarbia and transferred to [facility]. She denied any sick contacts or recent hospitalizations.     Overview/Hospital Course:  10/30:  Still on vent, cont current antibiotics, wean vent as tolerated    10/31:  Extubated this am, will continue to monitor    11/1:   Patient doing well, was informed by ICU staff that girlfriend states patient is a DNR. Discussion was had with Son (Shabbir Huffman) who is power of  [phone] and he confirmed DNR status. Patient likely to be stepped down from ICU today.   11/2: pt seen and examined, chart reviewed, d/w pt's partner and his son. Frail elderly man lying in bed, appears sick. NC and NGT in place, getting TF. CXR shows large R sided pneumonia, no dentures at present. WBC high with L shift. Bun up to 50.   11/3- looks lot better, more alert and responsive. Talking, getting TF via NGT, tolerating it well. Did PT/OT and moving all 4 ext in bed. Bun increased-- hence lasix decreased and free water increased via NGT. Hopefully will be able to take oral food in a day or two.     Interval History: looks lot  better, more alert and responsive. Talking, getting TF via NGT, tolerating it well. Did PT/OT and moving all 4 ext in bed. Bun increased-- hence lasix decreased and free water increased via NGT. Hopefully will be able to take oral food in a day or two.     Review of Systems   Unable to perform ROS: Acuity of condition   Constitutional: Positive for activity change and appetite change. Negative for fatigue and fever.   HENT: Positive for congestion.    Eyes: Negative.    Respiratory: Positive for cough and shortness of breath.    Cardiovascular: Negative for palpitations and leg swelling.   Gastrointestinal: Negative.  Negative for nausea and vomiting.   Endocrine: Negative.    Genitourinary: Negative.    Musculoskeletal: Positive for gait problem.   Skin: Negative for rash.   Neurological: Positive for weakness.     Objective:     Vital Signs (Most Recent):  Temp: 97.5 °F (36.4 °C) (11/03/19 1220)  Pulse: 88 (11/03/19 1334)  Resp: 20 (11/03/19 1305)  BP: 125/84 (11/03/19 1220)  SpO2: (!) 94 % (11/03/19 1305) Vital Signs (24h Range):  Temp:  [96.8 °F (36 °C)-97.9 °F (36.6 °C)] 97.5 °F (36.4 °C)  Pulse:  [] 88  Resp:  [18-22] 20  SpO2:  [91 %-96 %] 94 %  BP: (125-155)/(84-93) 125/84     Weight: 77.5 kg (170 lb 13.7 oz)  Body mass index is 21.36 kg/m².    Intake/Output Summary (Last 24 hours) at 11/3/2019 1536  Last data filed at 11/3/2019 0600  Gross per 24 hour   Intake 1910.07 ml   Output 4375 ml   Net -2464.93 ml      Physical Exam   Constitutional: He appears lethargic. He has a sickly appearance. No distress. Nasal cannula in place.   Frail elderly man appears sick   HENT:   Head: Normocephalic and atraumatic.   edentulous   Eyes: Pupils are equal, round, and reactive to light. Conjunctivae and EOM are normal.   Neck: Normal range of motion. Neck supple.   Cardiovascular: Exam reveals no gallop and no friction rub.   No murmur heard.  Irregular irregular   Pulmonary/Chest: No stridor. No respiratory  distress. He has no wheezes. He has rales.   Abdominal: Soft. Bowel sounds are normal. He exhibits no distension and no mass. There is no tenderness. There is no guarding.   Genitourinary:   Genitourinary Comments: deferred   Musculoskeletal: He exhibits no edema.   Neurological: He appears lethargic.   Skin: Skin is warm. No rash noted. He is not diaphoretic.   Nursing note and vitals reviewed.      Significant Labs:   BMP:   Recent Labs   Lab 11/03/19  0445   *   *   K 3.3*   *   CO2 25   BUN 66*   CREATININE 1.1   CALCIUM 9.9   MG 1.9     CBC:   Recent Labs   Lab 11/02/19  0342 11/03/19  0445   WBC 12.87* 10.64   HGB 12.3* 13.3*   HCT 38.3* 40.0   * 153     Respiratory Culture:   All pertinent labs within the past 24 hours have been reviewed.    Significant Imaging: I have reviewed all pertinent imaging results/findings within the past 24 hours.      Assessment/Plan:      * Aspiration pneumonia of right lower lobe due to gastric secretions  10/31:  Continue vanc, cefepime, and flagyl  procalcitonin trending up  WBC within normal limits  Chest xray ordered  Continue to monitor for response  Repeat procalcitonin in am  Sputum culture normal mikel    11/1:  Currently on vanc, cefepime and flagyl   procalcitonin repeated today trending down   Continue current abx  Plan to dc vanc in am   Continue to monitor procal for response   Blood cultures no growth  Will start PT/OT prior to discharge  Discussion was had with son who states he can come down and drive patient up to the [facility]   Informed son that if patient is stable for discharge he can likely fly home    Looks quiet sick and frail, still has congestion, very weak and easily dyspneic  Continue present care  Prognosis guarded to poor.    11/3- improving  Continue present care    New onset of congestive heart failure  11/1:  Reviewed echo  No history of chf reported  EF of 30% here  Will consult cards on case    On IV lasix, dose  reduced      Physical deconditioning  11/1:  Will start PT/OT/ST to evaluate for any possible needs     Critical Care Myopathy    Continue PT/OT      Parkinson disease  10/29:  Resume home meds      Panlobular emphysema  On nebs      Acute respiratory failure with hypoxia  10/29:  Likely 2/2 to aspiration PNA  Sputum culture pending  Blood culture pending  On vanc, cefepime, and flagyl  Continue to wean vent as tolerated   Reviewed blood gas     10/30:  Currently on vent   Sputum cultures pending  Continue current antibiotics   Wean vent as tolerated   Reviewed blood gas today  Will order procal for trend in am     S/p vent support, continue supportive care  Continue steroids    improving      Metabolic acidosis with respiratory acidosis  Improving    Add free water      Atrial fibrillation with RVR  10/29:  Not on rate control medical medications at home  HR ok for now  On coumadin   Pharm to dose   Metoprolol IV PRN     10/30:  Rate controlled     10/31:  Controlled     11/1:  Rate controlled, coumadin managed by pharm       VTE Risk Mitigation (From admission, onward)         Ordered     warfarin (COUMADIN) tablet 2.5 mg  Daily      11/02/19 1041     Place sequential compression device  Until discontinued      10/29/19 1630     IP VTE HIGH RISK PATIENT  Once      10/29/19 1630                      Kalen Allan MD  Department of Hospital Medicine   [facility]

## 2019-11-03 NOTE — PROGRESS NOTES
[facility]  Cardiology  Progress Note    Patient Name: Shaq Huffman  MRN: [id number]  Admission Date: 10/29/2019  Hospital Length of Stay: 5 days  Code Status: DNR   Attending Physician: Kalen Allan MD   Primary Care Physician: Provider Notinsystem  Expected Discharge Date:   Principal Problem:Aspiration pneumonia of right lower lobe due to gastric secretions    Subjective:   HPI:  Mr. Huffman is an 86 year old male patient whose current medical conditions include Parkinson's disease, chronic afib (on Coumadin), GERD, and COPD who was transferred to [facility] from outside facility due to respiratory failure. Patient was on board a [place] [employer] cruise when he began to complain of progressively worsening SOB. Associated symptoms included coughing and lethargy. EMT was called and subsequently sent patient to ED. His respiratory status continued to decline and he was subsequently intubated. Initial workup in ED revealed troponin of 0.029>0.159, BNP of 353, lactic acidosis (2.7), and positive procalcitonin. CXR showed RLL infiltrate concerning for aspiration and coarse interstitial markings. EKG showed afib with RVR with HR in 150's and patient was subsequently admitted for further evaluation and treatment. Cardiology consulted to assist with management. Patient seen and examined today, sitting up in bed. Still complains of SOB. Tachypneic and in mild respiratory distress at time of exam. Remains chest pain free. He reports compliance with his medications. Chart reviewed. Troponin trending down. 2D echo showed EF of 25-40%, DD, and RVE with moderately depressed systolic function, mild AS.  INR 2.6.    Hospital Course:   11/3/19-Patient seen and examined today. Feeling better, less SOB. More responsive. No chest pain. Good diuresis overnight. Labs reviewed. Creatinine stable, BUN, 66. K-3.3. INR 2.2.         Review of Systems   Constitution: Positive for malaise/fatigue.   HENT: Negative.    Eyes:  Negative.    Cardiovascular: Positive for dyspnea on exertion.   Respiratory: Positive for cough and shortness of breath.    Endocrine: Negative.    Hematologic/Lymphatic: Bruises/bleeds easily.   Skin: Negative.    Musculoskeletal: Negative.    Gastrointestinal: Negative.    Genitourinary: Negative.    Neurological: Negative.    Psychiatric/Behavioral: Negative.    Allergic/Immunologic: Negative.      Objective:     Vital Signs (Most Recent):  Temp: 96.8 °F (36 °C) (11/03/19 0727)  Pulse: 82 (11/03/19 1130)  Resp: (!) 22 (11/03/19 0727)  BP: (!) 155/92 (11/03/19 0727)  SpO2: (!) 93 % (11/03/19 0727) Vital Signs (24h Range):  Temp:  [96.8 °F (36 °C)-97.9 °F (36.6 °C)] 96.8 °F (36 °C)  Pulse:  [] 82  Resp:  [17-22] 22  SpO2:  [91 %-96 %] 93 %  BP: (133-166)/(85-94) 155/92     Weight: 77.5 kg (170 lb 13.7 oz)  Body mass index is 21.36 kg/m².     SpO2: (!) 93 %  O2 Device (Oxygen Therapy): nasal cannula      Intake/Output Summary (Last 24 hours) at 11/3/2019 1219  Last data filed at 11/3/2019 0600  Gross per 24 hour   Intake 1910.07 ml   Output 4375 ml   Net -2464.93 ml       Lines/Drains/Airways     Peripherally Inserted Central Catheter Line                 PICC Double Lumen 10/30/19 0445 left brachial 4 days          Drain                 NG/OG Tube 10/29/19 1200 Yale sump 16 Fr. Left nostril 5 days         Urethral Catheter 10/29/19 1440 16 Fr. 4 days                Physical Exam   Constitutional: He is oriented to person, place, and time. He appears well-developed and well-nourished. No distress.   Elderly, frail appearing    NGT in place   HENT:   Head: Normocephalic and atraumatic.   Eyes: Pupils are equal, round, and reactive to light. Right eye exhibits no discharge. Left eye exhibits no discharge.   Neck: Neck supple. No JVD present.   Cardiovascular: Normal rate, S1 normal, S2 normal and normal heart sounds. An irregularly irregular rhythm present.   No murmur heard.  Pulmonary/Chest: Effort normal.  No respiratory distress. He has wheezes. He has rales.   Abdominal: Soft. He exhibits no distension.   Musculoskeletal: He exhibits no edema.   Neurological: He is alert and oriented to person, place, and time.   Skin: Skin is warm and dry. He is not diaphoretic. No erythema.   Psychiatric: He has a normal mood and affect. His behavior is normal. Thought content normal.   Nursing note and vitals reviewed.      Significant Labs:   CMP   Recent Labs   Lab 11/02/19 0342 11/03/19  0445    148*   K 4.0 3.3*   * 111*   CO2 22* 25    123*   BUN 50* 66*   CREATININE 1.0 1.1   CALCIUM 10.0 9.9   ANIONGAP 8 12   ESTGFRAFRICA >60 >60   EGFRNONAA >60 >60   , CBC   Recent Labs   Lab 11/02/19 0342 11/03/19  0445   WBC 12.87* 10.64   HGB 12.3* 13.3*   HCT 38.3* 40.0   * 153   , Troponin No results for input(s): TROPONINI in the last 48 hours. and All pertinent lab results from the last 24 hours have been reviewed.    Significant Imaging: Echocardiogram: 2D echo with color flow doppler: No results found for this or any previous visit., EKG: Reviewed and X-Ray: CXR: X-Ray Chest PA and Lateral (CXR): No results found for this visit on 10/29/19.    Assessment and Plan:   Patient who presents with SOB in setting of PNA/underlying lung conditions/CHF. Improving. Continue IV diuresis for additional day. Elevated troponin secondary to demand ischemia. Will need ischemic evaluation once respiratory status improves.     * Aspiration pneumonia of right lower lobe due to gastric secretions  -Mgmt as per hospital medicine  -On abx    New onset of congestive heart failure  -2D echo showed EF of 35-40%, DD, RVE with moderately depressed systolic function  -Diurese with IV Lasix 40 mg BID  -Continue BB, ACEi  -Will need ischemic evaluation once respiratory status improves    11/3/19  -Continue IV diuresis for additional day  -Continue BB, ACEi      Parkinson disease  -Mgmt as per hospital medicine    [facility]  emphysema  -Mgmt as per hospital medicine  -Improving    Elevated troponin  -Troponin trending down  -Elevation secondary to demand ischemia from afib with RVR, PNA, possible sepsis, CHF  -Denies chest pain  -Continue BB, ACEi  -Consider ischemic evaluation pending clinical course/once respiratory status improved    Sepsis  -Mgmt as per hospital medicine  -On abx    Atrial fibrillation with RVR  -HR elevated in setting of respiratory distress/underlyling illness  -Continue Coreg for rate-control  -Up titrate if BP permits  -Continue Coumadin for CVA prophylaxis    11/3/19  -Remains in afib, rate-controlled  -Continue BB  -Continue Coumadin, dosing as per pharmacy    SOB (shortness of breath)  -Multifactorial secondary to aspiration PNA, underlying lung conditions, and decompensated combined CHF  -Continue nebs/steroids  -Start IV Lasix 40 mg BID and assess response        VTE Risk Mitigation (From admission, onward)         Ordered     warfarin (COUMADIN) tablet 2.5 mg  Daily      11/02/19 1041     Place sequential compression device  Until discontinued      10/29/19 1630     IP VTE HIGH RISK PATIENT  Once      10/29/19 1630                Columba Soriano PA-C  Cardiology  [facility]

## 2019-11-03 NOTE — PLAN OF CARE
Frequent oral care throughout shift, pt responds verbally to questions Pt remained free from falls, fall precautions in place, pt is on tele monitor see shift rounds. No other c/o at this time, PICC line wnl dressing dry and intact. Call bell and personal belongings within reach. Hourly rounding completed. Instructed pt to call for assistance as needed. Will continue to monitor.

## 2019-11-03 NOTE — ASSESSMENT & PLAN NOTE
10/31:  Continue vanc, cefepime, and flagyl  procalcitonin trending up  WBC within normal limits  Chest xray ordered  Continue to monitor for response  Repeat procalcitonin in am  Sputum culture normal mikel    11/1:  Currently on vanc, cefepime and flagyl   procalcitonin repeated today trending down   Continue current abx  Plan to dc vanc in am   Continue to monitor procal for response   Blood cultures no growth  Will start PT/OT prior to discharge  Discussion was had with son who states he can come down and drive patient up to the [facility] [place]   Informed son that if patient is stable for discharge he can likely fly home    Looks quiet sick and frail, still has congestion, very weak and easily dyspneic  Continue present care  Prognosis guarded to poor.    11/3- improving  Continue present care

## 2019-11-03 NOTE — PT/OT/SLP EVAL
Physical Therapy Evaluation    Patient Name:  Shaq Huffman   MRN:  [id number]    Recommendations:     Discharge Recommendations:  nursing facility, skilled   Discharge Equipment Recommendations: (tbd by next level of care)   Barriers to discharge: girlfriend is not able to care for him until he gets stronger/back to prior level of functional indep    Assessment:     Shaq Huffman is a 86 y.o. male admitted with a medical diagnosis of Aspiration pneumonia of right lower lobe due to gastric secretions.  He presents with the following impairments/functional limitations:  weakness, gait instability, decreased ROM, decreased lower extremity function, impaired balance, impaired endurance, decreased coordination, impaired functional mobilty, impaired self care skills, decreased safety awareness, impaired cardiopulmonary response to activity.    Rehab Prognosis: Fair; patient would benefit from acute skilled PT services to address these deficits and reach maximum level of function.    Recent Surgery: * No surgery found *      Plan:     During this hospitalization, patient to be seen 5 x/week to address the identified rehab impairments via gait training, therapeutic activities, therapeutic exercises and progress toward the following goals:    · Plan of Care Expires:  11/09/19    Subjective     Chief Complaint: weakness; sob  Patient/Family Comments/goals: to go home/get stronger; return to prior level of function  Pain/Comfort:  ·      Patients cultural, spiritual, Catholic conflicts given the current situation:      Living Environment:  Lives with girlfriend; one story home with 2 SUMAN and 1 HR  Prior to admission, patients level of function was mod indep with spc prn.  Equipment used at home: cane, straight.  DME owned (not currently used): ?.  Upon discharge, patient will have assistance from snf first then goal is back to home with family A/hhs.    Objective:     Communicated with WILL Mayen prior to session.  Patient  found HOB elevated with oxygen, NG tube, bed alarm, telemetry, peripheral IV, pressure relief boots  upon PT entry to room.    General Precautions: Standard, aspiration, respiratory, fall   Orthopedic Precautions:N/A   Braces:       Exams:  · B LE ROM WFL; strength grossly 2+ to 3-/5    Functional Mobility:  · Bed mobility: max A x 1-2  · Transfers: mod A x 2 with RW from elevated bed  · Gt: 6 side steps with RW and max A x 2 for wt shifting and sliding feet to pt.'s L      Therapeutic Activities and Exercises:   PT educated patient/cg on POC, safety/fall precautions with mobility, breathing techs to help manage sob AND le exs to do in bed to dec stiffness    AM-PAC 6 CLICK MOBILITY  Total Score:      Patient left HOB elevated with all lines intact, call button in reach, bed alarm on, RN notified and son present.    GOALS:   Multidisciplinary Problems     Physical Therapy Goals        Problem: Physical Therapy Goal    Goal Priority Disciplines Outcome Goal Variances Interventions   Physical Therapy Goal     PT, PT/OT      Description:  1. Patient will perform supine to/from sit mod A  2. Patient will perform sit to/from stand with RW mod A  3. Patient will amb 25ft RW mod A                    History:     Past Medical History:   Diagnosis Date    A-fib     COPD (chronic obstructive pulmonary disease)        History reviewed. No pertinent surgical history.    Time Tracking:     PT Received On: 11/01/19  PT Start Time: 0730     PT Stop Time: 0755  PT Total Time (min): 25 min     Billable Minutes: Evaluation 15 and Therapeutic Activity 10      Maninder Moran, PT  11/03/2019

## 2019-11-03 NOTE — SUBJECTIVE & OBJECTIVE
Review of Systems   Constitution: Positive for malaise/fatigue.   HENT: Negative.    Eyes: Negative.    Cardiovascular: Positive for dyspnea on exertion.   Respiratory: Positive for cough and shortness of breath.    Endocrine: Negative.    Hematologic/Lymphatic: Bruises/bleeds easily.   Skin: Negative.    Musculoskeletal: Negative.    Gastrointestinal: Negative.    Genitourinary: Negative.    Neurological: Negative.    Psychiatric/Behavioral: Negative.    Allergic/Immunologic: Negative.      Objective:     Vital Signs (Most Recent):  Temp: 96.8 °F (36 °C) (11/03/19 0727)  Pulse: 82 (11/03/19 1130)  Resp: (!) 22 (11/03/19 0727)  BP: (!) 155/92 (11/03/19 0727)  SpO2: (!) 93 % (11/03/19 0727) Vital Signs (24h Range):  Temp:  [96.8 °F (36 °C)-97.9 °F (36.6 °C)] 96.8 °F (36 °C)  Pulse:  [] 82  Resp:  [17-22] 22  SpO2:  [91 %-96 %] 93 %  BP: (133-166)/(85-94) 155/92     Weight: 77.5 kg (170 lb 13.7 oz)  Body mass index is 21.36 kg/m².     SpO2: (!) 93 %  O2 Device (Oxygen Therapy): nasal cannula      Intake/Output Summary (Last 24 hours) at 11/3/2019 1219  Last data filed at 11/3/2019 0600  Gross per 24 hour   Intake 1910.07 ml   Output 4375 ml   Net -2464.93 ml       Lines/Drains/Airways     Peripherally Inserted Central Catheter Line                 PICC Double Lumen 10/30/19 0445 left brachial 4 days          Drain                 NG/OG Tube 10/29/19 1200 Will sump 16 Fr. Left nostril 5 days         Urethral Catheter 10/29/19 1440 16 Fr. 4 days                Physical Exam   Constitutional: He is oriented to person, place, and time. He appears well-developed and well-nourished. No distress.   Elderly, frail appearing    NGT in place   HENT:   Head: Normocephalic and atraumatic.   Eyes: Pupils are equal, round, and reactive to light. Right eye exhibits no discharge. Left eye exhibits no discharge.   Neck: Neck supple. No JVD present.   Cardiovascular: Normal rate, S1 normal, S2 normal and normal heart sounds.  An irregularly irregular rhythm present.   No murmur heard.  Pulmonary/Chest: Effort normal. No respiratory distress. He has wheezes. He has rales.   Abdominal: Soft. He exhibits no distension.   Musculoskeletal: He exhibits no edema.   Neurological: He is alert and oriented to person, place, and time.   Skin: Skin is warm and dry. He is not diaphoretic. No erythema.   Psychiatric: He has a normal mood and affect. His behavior is normal. Thought content normal.   Nursing note and vitals reviewed.      Significant Labs:   CMP   Recent Labs   Lab 11/02/19  0342 11/03/19  0445    148*   K 4.0 3.3*   * 111*   CO2 22* 25    123*   BUN 50* 66*   CREATININE 1.0 1.1   CALCIUM 10.0 9.9   ANIONGAP 8 12   ESTGFRAFRICA >60 >60   EGFRNONAA >60 >60   , CBC   Recent Labs   Lab 11/02/19  0342 11/03/19  0445   WBC 12.87* 10.64   HGB 12.3* 13.3*   HCT 38.3* 40.0   * 153   , Troponin No results for input(s): TROPONINI in the last 48 hours. and All pertinent lab results from the last 24 hours have been reviewed.    Significant Imaging: Echocardiogram: 2D echo with color flow doppler: No results found for this or any previous visit., EKG: Reviewed and X-Ray: CXR: X-Ray Chest PA and Lateral (CXR): No results found for this visit on 10/29/19.

## 2019-11-03 NOTE — ASSESSMENT & PLAN NOTE
-2D echo showed EF of 35-40%, DD, RVE with moderately depressed systolic function  -Diurese with IV Lasix 40 mg BID  -Continue BB, ACEi  -Will need ischemic evaluation once respiratory status improves    11/3/19  -Continue IV diuresis for additional day  -Continue BB, ACEi

## 2019-11-03 NOTE — ASSESSMENT & PLAN NOTE
11/1:  Will start PT/OT/ST to evaluate for any possible needs     Critical Care Myopathy    Continue PT/OT

## 2019-11-03 NOTE — PROGRESS NOTES
Pharmacist Renal Dose Adjustment Note    Shaq Huffman is a 86 y.o. male being treated with the medication Pepcid    Patient Data:    Vital Signs (Most Recent):  Temp: 96.8 °F (36 °C) (11/03/19 0727)  Pulse: 90 (11/03/19 0727)  Resp: (!) 22 (11/03/19 0727)  BP: (!) 155/92 (11/03/19 0727)  SpO2: (!) 93 % (11/03/19 0727)   Vital Signs (72h Range):  Temp:  [96.8 °F (36 °C)-99.3 °F (37.4 °C)]   Pulse:  []   Resp:  [14-35]   BP: ()/()   SpO2:  [90 %-99 %]      Recent Labs   Lab 11/01/19  0354 11/02/19  0342 11/03/19  0445   CREATININE 1.2 1.0 1.1     Serum creatinine: 1.1 mg/dL 11/03/19 0445  Estimated creatinine clearance: 52.8 mL/min    Medication:pepcid dose: 20mg frequency Qd will be changed to medication:Pepcid dose:20mg frequency:BID    Pharmacist's Name: Destiny Dan  Pharmacist's Extension: [phone]

## 2019-11-03 NOTE — PLAN OF CARE
Discussed Plan of Care with patient and verbalized understanding - Patient remains AAOx4 - remains free of falls, accidents and trauma during the day shift. Bed is in the low position and the call light is within reach. Humidified O2 to help with dryness, MD added 100ml free water Q6 per NGT and ST is allowing him ice chips sparingly for pleasure. Will continue to monitor

## 2019-11-03 NOTE — PT/OT/SLP PROGRESS
Speech Language Pathology Treatment    Patient Name:  Shaq Huffman   MRN:  [id number]  Admitting Diagnosis: Aspiration pneumonia of right lower lobe due to gastric secretions    Recommendations:                 General Recommendations:  Dysphagia therapy  Diet recommendations: Continue alternate source of nutrition with ice chips for pleasure sparingly  Aspiration Precautions: Ice chips sparingly   General Precautions: Standard, aspiration  Communication strategies:  provide increased time to answer    Subjective     ·  Pt alert, cooperative, and seen at bedside with family present    Objective:     Has the patient been evaluated by SLP for swallowing?   Yes  Keep patient NPO? No   Current Respiratory Status: nasal cannula      Pt with improved vocal strength and respirations. Oral care provided hydration, sensation, and generation of spontaneous swallow. Pt with increase timing and strength of the swallow upon manual palpitation. Pt tolerated po intake of ice chips without any overt s/s of aspiration. Pt exhibited an immediate cough with X1 trial of thin liquids via spoon and series of wet coughs following trial X2 of smooth puree. Trials immediately halted secondary to s/s of aspiration. Recommend continued alternate source nutrition with ice chips sparingly for pleasure.    Assessment:     Shaq Huffman is a 86 y.o. male with an SLP diagnosis of Dysphagia.     Goals:   Multidisciplinary Problems     SLP Goals        Problem: SLP Goal    Goal Priority Disciplines Outcome   SLP Goal     SLP Ongoing, Progressing   Description:  1. Pt will complete oral and pharyngeal exercises with min A for increased strength and ROM  2. Pt will tolerate least restrictive diet without incidence and while maintaining airway integrity                     Plan:     · Patient to be seen:  3 x/week   · Plan of Care expires:  11/08/19  · Plan of Care reviewed with:  patient, significant other, son   · SLP Follow-Up:  Yes        Discharge recommendations:  nursing facility, skilled   Barriers to Discharge:  Level of Skilled Assistance Needed mod    Time Tracking:     SLP Treatment Date:   11/03/19  Speech Start Time:  0919  Speech Stop Time:  0935     Speech Total Time (min):  16 min    Billable Minutes: Treatment Swallowing Dysfunction 16    Betty Chun CCC-SLP  11/03/2019

## 2019-11-03 NOTE — ASSESSMENT & PLAN NOTE
11/1:  Reviewed echo  No history of chf reported  EF of 30% here  Will consult cards on case    On IV lasix, dose reduced

## 2019-11-04 PROBLEM — E87.4 METABOLIC ACIDOSIS WITH RESPIRATORY ACIDOSIS: Status: RESOLVED | Noted: 2019-10-29 | Resolved: 2019-11-04

## 2019-11-04 LAB
ANION GAP SERPL CALC-SCNC: 11 MMOL/L (ref 8–16)
BASOPHILS # BLD AUTO: 0.05 K/UL (ref 0–0.2)
BASOPHILS NFR BLD: 0.5 % (ref 0–1.9)
BUN SERPL-MCNC: 77 MG/DL (ref 8–23)
CALCIUM SERPL-MCNC: 9.8 MG/DL (ref 8.7–10.5)
CHLORIDE SERPL-SCNC: 108 MMOL/L (ref 95–110)
CO2 SERPL-SCNC: 29 MMOL/L (ref 23–29)
CREAT SERPL-MCNC: 1 MG/DL (ref 0.5–1.4)
DIFFERENTIAL METHOD: ABNORMAL
EOSINOPHIL # BLD AUTO: 0 K/UL (ref 0–0.5)
EOSINOPHIL NFR BLD: 0.2 % (ref 0–8)
ERYTHROCYTE [DISTWIDTH] IN BLOOD BY AUTOMATED COUNT: 13.9 % (ref 11.5–14.5)
EST. GFR  (AFRICAN AMERICAN): >60 ML/MIN/1.73 M^2
EST. GFR  (NON AFRICAN AMERICAN): >60 ML/MIN/1.73 M^2
GLUCOSE SERPL-MCNC: 116 MG/DL (ref 70–110)
HCT VFR BLD AUTO: 43.1 % (ref 40–54)
HGB BLD-MCNC: 14 G/DL (ref 14–18)
IMM GRANULOCYTES # BLD AUTO: 0.36 K/UL (ref 0–0.04)
IMM GRANULOCYTES NFR BLD AUTO: 3.3 % (ref 0–0.5)
INR PPP: 2.2 (ref 0.8–1.2)
LYMPHOCYTES # BLD AUTO: 1.2 K/UL (ref 1–4.8)
LYMPHOCYTES NFR BLD: 10.4 % (ref 18–48)
MAGNESIUM SERPL-MCNC: 1.9 MG/DL (ref 1.6–2.6)
MCH RBC QN AUTO: 31.6 PG (ref 27–31)
MCHC RBC AUTO-ENTMCNC: 32.5 G/DL (ref 32–36)
MCV RBC AUTO: 97 FL (ref 82–98)
MONOCYTES # BLD AUTO: 1.5 K/UL (ref 0.3–1)
MONOCYTES NFR BLD: 13.6 % (ref 4–15)
NEUTROPHILS # BLD AUTO: 8 K/UL (ref 1.8–7.7)
NEUTROPHILS NFR BLD: 72 % (ref 38–73)
NRBC BLD-RTO: 0 /100 WBC
PLATELET # BLD AUTO: 186 K/UL (ref 150–350)
PMV BLD AUTO: 10.2 FL (ref 9.2–12.9)
POTASSIUM SERPL-SCNC: 3.1 MMOL/L (ref 3.5–5.1)
PROTHROMBIN TIME: 23 SEC (ref 9–12.5)
RBC # BLD AUTO: 4.43 M/UL (ref 4.6–6.2)
SODIUM SERPL-SCNC: 148 MMOL/L (ref 136–145)
WBC # BLD AUTO: 11.03 K/UL (ref 3.9–12.7)

## 2019-11-04 PROCEDURE — 94761 N-INVAS EAR/PLS OXIMETRY MLT: CPT

## 2019-11-04 PROCEDURE — 94640 AIRWAY INHALATION TREATMENT: CPT

## 2019-11-04 PROCEDURE — 25000003 PHARM REV CODE 250: Performed by: NURSE PRACTITIONER

## 2019-11-04 PROCEDURE — S0030 INJECTION, METRONIDAZOLE: HCPCS | Performed by: NURSE PRACTITIONER

## 2019-11-04 PROCEDURE — 97530 THERAPEUTIC ACTIVITIES: CPT

## 2019-11-04 PROCEDURE — 99232 PR SUBSEQUENT HOSPITAL CARE,LEVL II: ICD-10-PCS | Mod: ,,, | Performed by: INTERNAL MEDICINE

## 2019-11-04 PROCEDURE — 36415 COLL VENOUS BLD VENIPUNCTURE: CPT

## 2019-11-04 PROCEDURE — 80048 BASIC METABOLIC PNL TOTAL CA: CPT

## 2019-11-04 PROCEDURE — 85610 PROTHROMBIN TIME: CPT

## 2019-11-04 PROCEDURE — A4216 STERILE WATER/SALINE, 10 ML: HCPCS | Performed by: NURSE PRACTITIONER

## 2019-11-04 PROCEDURE — 25000242 PHARM REV CODE 250 ALT 637 W/ HCPCS: Performed by: NURSE PRACTITIONER

## 2019-11-04 PROCEDURE — 27000221 HC OXYGEN, UP TO 24 HOURS

## 2019-11-04 PROCEDURE — 25000003 PHARM REV CODE 250: Performed by: EMERGENCY MEDICINE

## 2019-11-04 PROCEDURE — 92526 ORAL FUNCTION THERAPY: CPT

## 2019-11-04 PROCEDURE — 83735 ASSAY OF MAGNESIUM: CPT

## 2019-11-04 PROCEDURE — 25000003 PHARM REV CODE 250: Performed by: INTERNAL MEDICINE

## 2019-11-04 PROCEDURE — 63600175 PHARM REV CODE 636 W HCPCS: Performed by: NURSE PRACTITIONER

## 2019-11-04 PROCEDURE — 85025 COMPLETE CBC W/AUTO DIFF WBC: CPT

## 2019-11-04 PROCEDURE — 21400001 HC TELEMETRY ROOM

## 2019-11-04 PROCEDURE — 97110 THERAPEUTIC EXERCISES: CPT | Performed by: PHYSICAL THERAPIST

## 2019-11-04 PROCEDURE — 97116 GAIT TRAINING THERAPY: CPT | Performed by: PHYSICAL THERAPIST

## 2019-11-04 PROCEDURE — 99232 SBSQ HOSP IP/OBS MODERATE 35: CPT | Mod: ,,, | Performed by: INTERNAL MEDICINE

## 2019-11-04 RX ORDER — POTASSIUM CHLORIDE 20 MEQ/15ML
40 SOLUTION ORAL ONCE
Status: COMPLETED | OUTPATIENT
Start: 2019-11-04 | End: 2019-11-04

## 2019-11-04 RX ADMIN — BUDESONIDE 0.5 MG: 0.5 INHALANT RESPIRATORY (INHALATION) at 08:11

## 2019-11-04 RX ADMIN — BUDESONIDE 0.5 MG: 0.5 INHALANT RESPIRATORY (INHALATION) at 07:11

## 2019-11-04 RX ADMIN — Medication 10 ML: at 12:11

## 2019-11-04 RX ADMIN — CEFEPIME HYDROCHLORIDE 2 G: 2 INJECTION, SOLUTION INTRAVENOUS at 04:11

## 2019-11-04 RX ADMIN — ARFORMOTEROL TARTRATE 15 MCG: 15 SOLUTION RESPIRATORY (INHALATION) at 07:11

## 2019-11-04 RX ADMIN — IPRATROPIUM BROMIDE AND ALBUTEROL SULFATE 3 ML: .5; 3 SOLUTION RESPIRATORY (INHALATION) at 01:11

## 2019-11-04 RX ADMIN — FAMOTIDINE 20 MG: 20 TABLET ORAL at 09:11

## 2019-11-04 RX ADMIN — CEFEPIME HYDROCHLORIDE 2 G: 2 INJECTION, SOLUTION INTRAVENOUS at 05:11

## 2019-11-04 RX ADMIN — Medication 10 ML: at 09:11

## 2019-11-04 RX ADMIN — METRONIDAZOLE 500 MG: 500 INJECTION, SOLUTION INTRAVENOUS at 02:11

## 2019-11-04 RX ADMIN — MELATONIN 1000 UNITS: at 09:11

## 2019-11-04 RX ADMIN — POTASSIUM CHLORIDE 40 MEQ: 20 SOLUTION ORAL at 09:11

## 2019-11-04 RX ADMIN — IPRATROPIUM BROMIDE AND ALBUTEROL SULFATE 3 ML: .5; 3 SOLUTION RESPIRATORY (INHALATION) at 07:11

## 2019-11-04 RX ADMIN — METRONIDAZOLE 500 MG: 500 INJECTION, SOLUTION INTRAVENOUS at 10:11

## 2019-11-04 RX ADMIN — ARFORMOTEROL TARTRATE 15 MCG: 15 SOLUTION RESPIRATORY (INHALATION) at 08:11

## 2019-11-04 RX ADMIN — LISINOPRIL 20 MG: 20 TABLET ORAL at 09:11

## 2019-11-04 RX ADMIN — POLYETHYLENE GLYCOL (3350) 17 G: 17 POWDER, FOR SOLUTION ORAL at 09:11

## 2019-11-04 RX ADMIN — CARBIDOPA AND LEVODOPA 1 TABLET: 25; 100 TABLET ORAL at 05:11

## 2019-11-04 RX ADMIN — Medication 10 ML: at 05:11

## 2019-11-04 RX ADMIN — IPRATROPIUM BROMIDE AND ALBUTEROL SULFATE 3 ML: .5; 3 SOLUTION RESPIRATORY (INHALATION) at 08:11

## 2019-11-04 RX ADMIN — WARFARIN SODIUM 2.5 MG: 2.5 TABLET ORAL at 05:11

## 2019-11-04 RX ADMIN — CARVEDILOL 6.25 MG: 6.25 TABLET, FILM COATED ORAL at 09:11

## 2019-11-04 RX ADMIN — CARBIDOPA AND LEVODOPA 1 TABLET: 25; 100 TABLET ORAL at 09:11

## 2019-11-04 RX ADMIN — METRONIDAZOLE 500 MG: 500 INJECTION, SOLUTION INTRAVENOUS at 05:11

## 2019-11-04 NOTE — PROGRESS NOTES
[facility]  Cardiology  Progress Note    Patient Name: Shaq Huffman  MRN: [id number]  Admission Date: 10/29/2019  Hospital Length of Stay: 6 days  Code Status: DNR   Attending Physician: Kalen Allan MD   Primary Care Physician: Provider Not[employer]  Expected Discharge Date:   Principal Problem:Aspiration pneumonia of right lower lobe due to gastric secretions    Subjective:   HPI:  Mr. Huffman is an 86 year old male patient whose current medical conditions include Parkinson's disease, chronic afib (on Coumadin), GERD, and COPD who was transferred to [facility] from outside facility due to respiratory failure. Patient was on [place] a [place] [employer] [place] when he began to complain of progressively worsening SOB. Associated symptoms included coughing and lethargy. EMT was called and subsequently sent patient to ED. His respiratory status continued to decline and he was subsequently intubated. Initial workup in ED revealed troponin of 0.029>0.159, BNP of 353, lactic acidosis (2.7), and positive procalcitonin. CXR showed RLL infiltrate concerning for aspiration and coarse interstitial markings. EKG showed afib with RVR with HR in 150's and patient was subsequently admitted for further evaluation and treatment. Cardiology consulted to assist with management. Patient seen and examined today, sitting up in bed. Still complains of SOB. Tachypneic and in mild respiratory distress at time of exam. Remains chest pain free. He reports compliance with his medications. Chart reviewed. Troponin trending down. 2D echo showed EF of 25-40%, DD, and RVE with moderately depressed systolic function, mild AS.  INR 2.6.    Hospital Course:   11/3/19-Patient seen and examined today. Feeling better, less SOB. More responsive. No chest pain. Good diuresis overnight. Labs reviewed. Creatinine stable, BUN, 66. K-3.3. INR 2.2.     11/4/19-Patient seen and examined today. Improving. SOB better. Worked with PT/OT this AM. No  chest pain.  Labs reviewed. Creatinine 1.0, BUN 77, Na 148, K 3.1. INR 2.2.        Review of Systems   Constitution: Positive for malaise/fatigue.   HENT: Negative.    Eyes: Negative.    Cardiovascular: Positive for dyspnea on exertion.   Respiratory: Positive for cough and shortness of breath.    Endocrine: Negative.    Hematologic/Lymphatic: Bruises/bleeds easily.   Skin: Negative.    Musculoskeletal: Negative.    Gastrointestinal: Negative.    Genitourinary: Negative.    Neurological: Negative.    Psychiatric/Behavioral: Negative.    Allergic/Immunologic: Negative.      Objective:     Vital Signs (Most Recent):  Temp: 96.9 °F (36.1 °C) (11/04/19 0749)  Pulse: 85 (11/04/19 0749)  Resp: 14 (11/04/19 0749)  BP: (!) 149/96 (11/04/19 0749)  SpO2: (!) 93 % (11/04/19 0749) Vital Signs (24h Range):  Temp:  [96.9 °F (36.1 °C)-98.2 °F (36.8 °C)] 96.9 °F (36.1 °C)  Pulse:  [80-99] 85  Resp:  [14-20] 14  SpO2:  [92 %-95 %] 93 %  BP: (122-152)/(60-99) 149/96     Weight: 69.8 kg (153 lb 14.1 oz)  Body mass index is 19.23 kg/m².     SpO2: (!) 93 %  O2 Device (Oxygen Therapy): nasal cannula      Intake/Output Summary (Last 24 hours) at 11/4/2019 1058  Last data filed at 11/4/2019 0600  Gross per 24 hour   Intake 1517.02 ml   Output 4000 ml   Net -2482.98 ml       Lines/Drains/Airways     Peripherally Inserted Central Catheter Line                 PICC Double Lumen 10/30/19 0445 left brachial 5 days          Drain                 NG/OG Tube 10/29/19 1200 [place] sump 16 Fr. Left nostril 5 days         Urethral Catheter 10/29/19 1440 16 Fr. 5 days                Physical Exam   Constitutional: He is oriented to person, place, and time. He appears well-developed. No distress.   Cachectic, frail appearing   HENT:   Head: Normocephalic and atraumatic.   Eyes: Pupils are equal, round, and reactive to light. Right eye exhibits no discharge. Left eye exhibits no discharge.   Neck: Neck supple. No JVD present.   Cardiovascular: Normal  rate, S1 normal, S2 normal and normal heart sounds. An irregularly irregular rhythm present. Exam reveals no S4.   No murmur heard.  Pulmonary/Chest: Effort normal. No respiratory distress. He has wheezes.   Rhonchi   Abdominal: Soft. He exhibits no distension.   Musculoskeletal: He exhibits no edema.   Neurological: He is alert and oriented to person, place, and time.   Skin: Skin is warm and dry. He is not diaphoretic. No erythema.   Psychiatric: He has a normal mood and affect. His behavior is normal. Thought content normal.   Nursing note and vitals reviewed.      Significant Labs:   CMP   Recent Labs   Lab 11/03/19 0445 11/04/19 0408   * 148*   K 3.3* 3.1*   * 108   CO2 25 29   * 116*   BUN 66* 77*   CREATININE 1.1 1.0   CALCIUM 9.9 9.8   ANIONGAP 12 11   ESTGFRAFRICA >60 >60   EGFRNONAA >60 >60   , CBC   Recent Labs   Lab 11/03/19 0445 11/04/19  0408   WBC 10.64 11.03   HGB 13.3* 14.0   HCT 40.0 43.1    186   , Troponin No results for input(s): TROPONINI in the last 48 hours. and All pertinent lab results from the last 24 hours have been reviewed.    Significant Imaging: Echocardiogram: 2D echo with color flow doppler: No results found for this or any previous visit., EKG: Reviewed and X-Ray: CXR: X-Ray Chest 1 View (CXR):   Results for orders placed or performed during the hospital encounter of 10/29/19   X-Ray Chest 1 View    Narrative    EXAMINATION:  XR CHEST 1 VIEW    CLINICAL HISTORY:  Pneumonia follow-up    COMPARISON:  Chest x-ray, 11/02/2019    FINDINGS:  There is a left-sided PICC with its tip in the SVC.  There is a NG tube with its tip in the body of the stomach.  The heart size is moderately enlarged.  Again seen is the extensive interstitial opacities throughout the right lung and to a lesser degree left lung.  There is a small right pleural effusion.      Impression    See above      Electronically signed by: Alex Carlos MD  Date:    11/03/2019  Time:    08:09     and X-Ray Chest PA and Lateral (CXR): No results found for this visit on 10/29/19.    Assessment and Plan:   Patient who presents with respiratory failure in setting of PNA, underlying lung conditions, new onset CHF. Clinically improving. Hold Lasix for now. Continue other meds. Consider ischemic evaluation with MPI stress test pending clinical course.    * Aspiration pneumonia of right lower lobe due to gastric secretions  -Mgmt as per hospital medicine  -On abx    New onset of congestive heart failure  -2D echo showed EF of 35-40%, DD, RVE with moderately depressed systolic function  -Diurese with IV Lasix 40 mg BID  -Continue BB, ACEi  -Will need ischemic evaluation once respiratory status improves    11/3/19  -Continue IV diuresis for additional day  -Continue BB, ACEi    11/4/19  -Lasix d/c'd due to rising BUN  -Continue BB, ACEi  -Optimize regimen further as clinical condition permits    Parkinson disease  -Mgmt as per hospital medicine    Panlobular emphysema  -Mgmt as per hospital medicine  -Improving    Atrial fibrillation with RVR  -HR elevated in setting of respiratory distress/underlyling illness  -Continue Coreg for rate-control  -Up titrate if BP permits  -Continue Coumadin for CVA prophylaxis    11/3/19  -Remains in afib, rate-controlled  -Continue BB  -Continue Coumadin, dosing as per pharmacy    11/4/19  Stable, see above        VTE Risk Mitigation (From admission, onward)         Ordered     warfarin (COUMADIN) tablet 2.5 mg  Daily      11/02/19 1041     Place sequential compression device  Until discontinued      10/29/19 1630     IP VTE HIGH RISK PATIENT  Once      10/29/19 1630                JESUS EscobarC  Cardiology  [facility]

## 2019-11-04 NOTE — PROGRESS NOTES
Pharmacy Consult Note: Warfarin    Indication: Hx of Afib  INR goal: 2-3    Today's INR: 2.2 (same as yesterday)    Home dose:  5 mg PO Mon, Wed, Fri  2.5 mg PO on all other days    A jump in INR was seen after administering a 5 mg dose. While inpatient, we will continue with 2.5 mg PO daily.    A dose of 2.5 mg will given today.    Pharmacy will continue to monitor daily PT/INR and make dosing adjustments as necessary.     This patient has been educated by Pharmacy.   Thank you for allowing us to participate in this patient's care.    Facundo Kerns, PharmD 11/4/2019 11:42 AM

## 2019-11-04 NOTE — PT/OT/SLP PROGRESS
Occupational Therapy  Treatment    Shaq Huffman   MRN: [id number]   Admitting Diagnosis: Aspiration pneumonia of right lower lobe due to gastric secretions    OT Date of Treatment: 11/04/19   OT Start Time: 1005  OT Stop Time: 1030  OT Total Time (min): 25 min    Billable Minutes:  Therapeutic Activity 25 minutes    General Precautions: Standard, aspiration, fall  Orthopedic Precautions: N/A  Braces: N/A         Subjective:  Communicated with nurse and epic chart review prior to session.  Pain/Comfort  Pain Rating 1: 0/10    Objective:  Patient found with: bed alarm, zuluaga catheter, telemetry     Functional Mobility:  Bed Mobility:  Min a with supine>sit and forward scooting    Transfers:   sit<>stand mod a   Functional Ambulation: pt ambulated 20 feet with mod a with assistance with vc's for safety and instruction as well as tactile cues to facilitate rolling walker with iv pole in tow  Activities of Daily Living:     Feeding adaptive equipment: na     UE adaptive equipment max a      LE adaptive equipment: total a with anton/doff socks      Bathing adaptive equipment: na    Balance:   Static Sit: FAIR+: Able to take MINIMAL challenges from all directions  Dynamic Sit: FAIR: Cannot move trunk without losing balance  Static Stand: POOR: Needs MODERATE assist to maintain  Dynamic stand: poor    Therapeutic Activities and Exercises:  Pt performed 1 set x 10 reps b ue shoulder flexion and elbow flex/ext seated eob.  AM-PAC 6 CLICK ADL   How much help from another person does this patient currently need?   1 = Unable, Total/Dependent Assistance  2 = A lot, Maximum/Moderate Assistance  3 = A little, Minimum/Contact Guard/Supervision  4 = None, Modified [place]/Independent    Putting on and taking off regular lower body clothing? : 1  Bathing (including washing, rinsing, drying)?: 1  Toileting, which includes using toilet, bedpan, or urinal? : 1(zuluaga placement)  Putting on and taking off regular upper body  "clothing?: 2  Taking care of personal grooming such as brushing teeth?: 2  Eating meals?: 2  Daily Activity Total Score: 9     AM-PAC Raw Score CMS "G-Code Modifier Level of Impairment Assistance   6 % Total / Unable   7 - 8 CM 80 - 100% Maximal Assist   9-13 CL 60 - 80% Moderate Assist   14 - 19 CK 40 - 60% Moderate Assist   20 - 22 CJ 20 - 40% Minimal Assist   23 CI 1-20% SBA / CGA   24 CH 0% Independent/ Mod I       Patient left up in chair with all lines intact, call button in reach, chair alarm on and nurse notified    ASSESSMENT:  Shaq Huffman is a 86 y.o. male with a medical diagnosis of Aspiration pneumonia of right lower lobe due to gastric secretions and presents with debility and generalized weakness Pt will continue to benefit from skilled O.T..    Rehab identified problem list/impairments: Rehab identified problem list/impairments: weakness, impaired functional mobilty, impaired balance, decreased upper extremity function, impaired endurance, impaired self care skills, gait instability, decreased safety awareness, pain    Rehab potential is good.    Activity tolerance: Good    Discharge recommendations:       Barriers to discharge:      Equipment recommendations: walker, rolling     GOALS:   Multidisciplinary Problems     Occupational Therapy Goals        Problem: Occupational Therapy Goal    Goal Priority Disciplines Outcome Interventions   Occupational Therapy Goal     OT, PT/OT Ongoing, Progressing    Description:  LTGs to be met by 11/9/19  1. Pt will perform BSC t/f with Mod A  2. Pt will perform LE dressing with Mod A  3. Pt will perform UE dressing with Min A  4. Pt will perform (B) UE ROM exercises 1 x 20 reps                    Plan:  Patient to be seen 3 x/week to address the above listed problems via self-care/home management, therapeutic activities, therapeutic exercises  Plan of Care expires:    Plan of Care reviewed with:           Aylin Vital OT  11/04/2019  "

## 2019-11-04 NOTE — SUBJECTIVE & OBJECTIVE
Review of Systems   Constitution: Positive for malaise/fatigue.   HENT: Negative.    Eyes: Negative.    Cardiovascular: Positive for dyspnea on exertion.   Respiratory: Positive for cough and shortness of breath.    Endocrine: Negative.    Hematologic/Lymphatic: Bruises/bleeds easily.   Skin: Negative.    Musculoskeletal: Negative.    Gastrointestinal: Negative.    Genitourinary: Negative.    Neurological: Negative.    Psychiatric/Behavioral: Negative.    Allergic/Immunologic: Negative.      Objective:     Vital Signs (Most Recent):  Temp: 96.9 °F (36.1 °C) (11/04/19 0749)  Pulse: 85 (11/04/19 0749)  Resp: 14 (11/04/19 0749)  BP: (!) 149/96 (11/04/19 0749)  SpO2: (!) 93 % (11/04/19 0749) Vital Signs (24h Range):  Temp:  [96.9 °F (36.1 °C)-98.2 °F (36.8 °C)] 96.9 °F (36.1 °C)  Pulse:  [80-99] 85  Resp:  [14-20] 14  SpO2:  [92 %-95 %] 93 %  BP: (122-152)/(60-99) 149/96     Weight: 69.8 kg (153 lb 14.1 oz)  Body mass index is 19.23 kg/m².     SpO2: (!) 93 %  O2 Device (Oxygen Therapy): nasal cannula      Intake/Output Summary (Last 24 hours) at 11/4/2019 1058  Last data filed at 11/4/2019 0600  Gross per 24 hour   Intake 1517.02 ml   Output 4000 ml   Net -2482.98 ml       Lines/Drains/Airways     Peripherally Inserted Central Catheter Line                 PICC Double Lumen 10/30/19 0445 left brachial 5 days          Drain                 NG/OG Tube 10/29/19 1200 [place] sump 16 Fr. Left nostril 5 days         Urethral Catheter 10/29/19 1440 16 Fr. 5 days                Physical Exam   Constitutional: He is oriented to person, place, and time. He appears well-developed. No distress.   Cachectic, frail appearing   HENT:   Head: Normocephalic and atraumatic.   Eyes: Pupils are equal, round, and reactive to light. Right eye exhibits no discharge. Left eye exhibits no discharge.   Neck: Neck supple. No JVD present.   Cardiovascular: Normal rate, S1 normal, S2 normal and normal heart sounds. An irregularly irregular rhythm  present. Exam reveals no S4.   No murmur heard.  Pulmonary/Chest: Effort normal. No respiratory distress. He has wheezes.   Rhonchi   Abdominal: Soft. He exhibits no distension.   Musculoskeletal: He exhibits no edema.   Neurological: He is alert and oriented to person, place, and time.   Skin: Skin is warm and dry. He is not diaphoretic. No erythema.   Psychiatric: He has a normal mood and affect. His behavior is normal. Thought content normal.   Nursing note and vitals reviewed.      Significant Labs:   CMP   Recent Labs   Lab 11/03/19 0445 11/04/19  0408   * 148*   K 3.3* 3.1*   * 108   CO2 25 29   * 116*   BUN 66* 77*   CREATININE 1.1 1.0   CALCIUM 9.9 9.8   ANIONGAP 12 11   ESTGFRAFRICA >60 >60   EGFRNONAA >60 >60   , CBC   Recent Labs   Lab 11/03/19 0445 11/04/19  0408   WBC 10.64 11.03   HGB 13.3* 14.0   HCT 40.0 43.1    186   , Troponin No results for input(s): TROPONINI in the last 48 hours. and All pertinent lab results from the last 24 hours have been reviewed.    Significant Imaging: Echocardiogram: 2D echo with color flow doppler: No results found for this or any previous visit., EKG: Reviewed and X-Ray: CXR: X-Ray Chest 1 View (CXR):   Results for orders placed or performed during the hospital encounter of 10/29/19   X-Ray Chest 1 View    Narrative    EXAMINATION:  XR CHEST 1 VIEW    CLINICAL HISTORY:  Pneumonia follow-up    COMPARISON:  Chest x-ray, 11/02/2019    FINDINGS:  There is a left-sided PICC with its tip in the SVC.  There is a NG tube with its tip in the body of the stomach.  The heart size is moderately enlarged.  Again seen is the extensive interstitial opacities throughout the right lung and to a lesser degree left lung.  There is a small right pleural effusion.      Impression    See above      Electronically signed by: Alex Carlos MD  Date:    11/03/2019  Time:    08:09    and X-Ray Chest PA and Lateral (CXR): No results found for this visit on 10/29/19.

## 2019-11-04 NOTE — PLAN OF CARE
Pt slept on and off during shift. Was aa0x4 verbally responsive ,POC reviewed with pt. Pt remained free from falls, fall precautions in place, pt is on tele monitor see shift rounds. No other c/o at this time, Call bell and personal belongings within reach. Hourly rounding completed. Instructed pt to call for assistance as needed. Will continue to monitor.

## 2019-11-04 NOTE — ASSESSMENT & PLAN NOTE
-HR elevated in setting of respiratory distress/underlyling illness  -Continue Coreg for rate-control  -Up titrate if BP permits  -Continue Coumadin for CVA prophylaxis    11/3/19  -Remains in afib, rate-controlled  -Continue BB  -Continue Coumadin, dosing as per pharmacy    11/4/19  Stable, see above

## 2019-11-04 NOTE — PT/OT/SLP PROGRESS
Speech Language Pathology Treatment    Patient Name:  Shaq Huffman   MRN:  [id number]  Admitting Diagnosis: Aspiration pneumonia of right lower lobe due to gastric secretions    Recommendations:                 General Recommendations:  Dysphagia therapy  Diet recommendations:  Puree, Liquid Diet Level: Nectar Thick   Aspiration Precautions: 1 bite/sip at a time, HOB to 90 degrees and Small bites/sips   General Precautions: Standard, aspiration, fall  Communication strategies:  none    Subjective     Pt alert and requesting to eat.   Patient goals: to eat    Pain/Comfort:  · Pain Rating 1: 0/10  · Pain Rating Post-Intervention 1: 0/10    Objective:     Has the patient been evaluated by SLP for swallowing?   Yes  Keep patient NPO? No   Current Respiratory Status: nasal cannula      Fly reports that pt is much more alert and attentive than yesterday. He is requesting to eat. Pt was given po trials of thin, nectar, puree and solids. He presents with delayed swallow and inconsistent laryngeal elevation. He presented with a delayed cough after sips of thin via straw. No overt s/s of aspiration were noted with thin via cup rim, nectar or puree or solids. Pt wore his dentures for trials of solids, but his dentures did not fit appropriately making mastication difficult. Recommend he begin po diet of puree with nectar thick liquids with reassessment tomorrow for possible upgrade.     Assessment:     Shaq Huffman is a 86 y.o. male with an SLP diagnosis of Dysphagia.  He presents with improved level of alertness and improved swallow function.    Goals:   Multidisciplinary Problems     SLP Goals        Problem: SLP Goal    Goal Priority Disciplines Outcome   SLP Goal     SLP Ongoing, Progressing   Description:  1. Pt will complete oral and pharyngeal exercises with min A for increased strength and ROM  2. Pt will tolerate least restrictive diet without incidence and while maintaining airway integrity                      Plan:     · Patient to be seen:  3 x/week   · Plan of Care expires:  11/08/19  · Plan of Care reviewed with:  patient   · SLP Follow-Up:  Yes       Discharge recommendations:  nursing facility, skilled   Barriers to Discharge:  None    Time Tracking:     SLP Treatment Date:   11/04/19  Speech Start Time:  0915  Speech Stop Time:  0939     Speech Total Time (min):  24 min    Billable Minutes: Treatment Swallowing Dysfunction 24    Danielle Willis CCC-SLP  11/04/2019

## 2019-11-04 NOTE — ASSESSMENT & PLAN NOTE
-2D echo showed EF of 35-40%, DD, RVE with moderately depressed systolic function  -Diurese with IV Lasix 40 mg BID  -Continue BB, ACEi  -Will need ischemic evaluation once respiratory status improves    11/3/19  -Continue IV diuresis for additional day  -Continue BB, ACEi    11/4/19  -Lasix d/c'd due to rising BUN  -Continue BB, ACEi  -Optimize regimen further as clinical condition permits

## 2019-11-05 LAB
ANION GAP SERPL CALC-SCNC: 11 MMOL/L (ref 8–16)
BASOPHILS # BLD AUTO: 0.03 K/UL (ref 0–0.2)
BASOPHILS NFR BLD: 0.3 % (ref 0–1.9)
BUN SERPL-MCNC: 71 MG/DL (ref 8–23)
CALCIUM SERPL-MCNC: 9.5 MG/DL (ref 8.7–10.5)
CHLORIDE SERPL-SCNC: 111 MMOL/L (ref 95–110)
CO2 SERPL-SCNC: 27 MMOL/L (ref 23–29)
CREAT SERPL-MCNC: 0.9 MG/DL (ref 0.5–1.4)
DIFFERENTIAL METHOD: ABNORMAL
EOSINOPHIL # BLD AUTO: 0.1 K/UL (ref 0–0.5)
EOSINOPHIL NFR BLD: 1.4 % (ref 0–8)
ERYTHROCYTE [DISTWIDTH] IN BLOOD BY AUTOMATED COUNT: 14.3 % (ref 11.5–14.5)
EST. GFR  (AFRICAN AMERICAN): >60 ML/MIN/1.73 M^2
EST. GFR  (NON AFRICAN AMERICAN): >60 ML/MIN/1.73 M^2
GLUCOSE SERPL-MCNC: 101 MG/DL (ref 70–110)
HCT VFR BLD AUTO: 43.1 % (ref 40–54)
HGB BLD-MCNC: 15.5 G/DL (ref 14–18)
IMM GRANULOCYTES # BLD AUTO: 0.26 K/UL (ref 0–0.04)
IMM GRANULOCYTES NFR BLD AUTO: 2.6 % (ref 0–0.5)
INR PPP: 2.2 (ref 0.8–1.2)
LYMPHOCYTES # BLD AUTO: 1.2 K/UL (ref 1–4.8)
LYMPHOCYTES NFR BLD: 11.6 % (ref 18–48)
MAGNESIUM SERPL-MCNC: 3.1 MG/DL (ref 1.6–2.6)
MCH RBC QN AUTO: 35.4 PG (ref 27–31)
MCHC RBC AUTO-ENTMCNC: 36 G/DL (ref 32–36)
MCV RBC AUTO: 98 FL (ref 82–98)
MONOCYTES # BLD AUTO: 1.4 K/UL (ref 0.3–1)
MONOCYTES NFR BLD: 13.9 % (ref 4–15)
NEUTROPHILS # BLD AUTO: 7.2 K/UL (ref 1.8–7.7)
NEUTROPHILS NFR BLD: 70.2 % (ref 38–73)
NRBC BLD-RTO: 0 /100 WBC
PLATELET # BLD AUTO: 185 K/UL (ref 150–350)
PMV BLD AUTO: 9.8 FL (ref 9.2–12.9)
POTASSIUM SERPL-SCNC: 3.8 MMOL/L (ref 3.5–5.1)
PROTHROMBIN TIME: 23.5 SEC (ref 9–12.5)
RBC # BLD AUTO: 4.38 M/UL (ref 4.6–6.2)
SODIUM SERPL-SCNC: 149 MMOL/L (ref 136–145)
WBC # BLD AUTO: 10.18 K/UL (ref 3.9–12.7)

## 2019-11-05 PROCEDURE — 25000003 PHARM REV CODE 250: Performed by: EMERGENCY MEDICINE

## 2019-11-05 PROCEDURE — 21400001 HC TELEMETRY ROOM

## 2019-11-05 PROCEDURE — S0030 INJECTION, METRONIDAZOLE: HCPCS | Performed by: NURSE PRACTITIONER

## 2019-11-05 PROCEDURE — 63600175 PHARM REV CODE 636 W HCPCS: Performed by: EMERGENCY MEDICINE

## 2019-11-05 PROCEDURE — 94761 N-INVAS EAR/PLS OXIMETRY MLT: CPT

## 2019-11-05 PROCEDURE — 27000646 HC AEROBIKA DEVICE

## 2019-11-05 PROCEDURE — 63600175 PHARM REV CODE 636 W HCPCS: Performed by: NURSE PRACTITIONER

## 2019-11-05 PROCEDURE — 85610 PROTHROMBIN TIME: CPT

## 2019-11-05 PROCEDURE — 94640 AIRWAY INHALATION TREATMENT: CPT

## 2019-11-05 PROCEDURE — 97530 THERAPEUTIC ACTIVITIES: CPT

## 2019-11-05 PROCEDURE — 25000003 PHARM REV CODE 250: Performed by: NURSE PRACTITIONER

## 2019-11-05 PROCEDURE — 99900035 HC TECH TIME PER 15 MIN (STAT)

## 2019-11-05 PROCEDURE — 97530 THERAPEUTIC ACTIVITIES: CPT | Performed by: PHYSICAL THERAPIST

## 2019-11-05 PROCEDURE — 25000242 PHARM REV CODE 250 ALT 637 W/ HCPCS: Performed by: NURSE PRACTITIONER

## 2019-11-05 PROCEDURE — A4216 STERILE WATER/SALINE, 10 ML: HCPCS | Performed by: NURSE PRACTITIONER

## 2019-11-05 PROCEDURE — 99232 SBSQ HOSP IP/OBS MODERATE 35: CPT | Mod: ,,, | Performed by: INTERNAL MEDICINE

## 2019-11-05 PROCEDURE — 25000003 PHARM REV CODE 250: Performed by: INTERNAL MEDICINE

## 2019-11-05 PROCEDURE — 99232 PR SUBSEQUENT HOSPITAL CARE,LEVL II: ICD-10-PCS | Mod: ,,, | Performed by: INTERNAL MEDICINE

## 2019-11-05 PROCEDURE — 94664 DEMO&/EVAL PT USE INHALER: CPT

## 2019-11-05 PROCEDURE — 94799 UNLISTED PULMONARY SVC/PX: CPT

## 2019-11-05 PROCEDURE — 83735 ASSAY OF MAGNESIUM: CPT

## 2019-11-05 PROCEDURE — 80048 BASIC METABOLIC PNL TOTAL CA: CPT

## 2019-11-05 PROCEDURE — 36415 COLL VENOUS BLD VENIPUNCTURE: CPT

## 2019-11-05 PROCEDURE — 97116 GAIT TRAINING THERAPY: CPT | Performed by: PHYSICAL THERAPIST

## 2019-11-05 PROCEDURE — 85025 COMPLETE CBC W/AUTO DIFF WBC: CPT

## 2019-11-05 PROCEDURE — 27000221 HC OXYGEN, UP TO 24 HOURS

## 2019-11-05 RX ORDER — CYANOCOBALAMIN 1000 UG/ML
1000 INJECTION, SOLUTION INTRAMUSCULAR; SUBCUTANEOUS ONCE
Status: COMPLETED | OUTPATIENT
Start: 2019-11-05 | End: 2019-11-05

## 2019-11-05 RX ORDER — GUAIFENESIN 600 MG/1
600 TABLET, EXTENDED RELEASE ORAL 2 TIMES DAILY
Status: DISCONTINUED | OUTPATIENT
Start: 2019-11-05 | End: 2019-11-07 | Stop reason: HOSPADM

## 2019-11-05 RX ORDER — AMOXICILLIN AND CLAVULANATE POTASSIUM 875; 125 MG/1; MG/1
1 TABLET, FILM COATED ORAL EVERY 12 HOURS
Status: DISCONTINUED | OUTPATIENT
Start: 2019-11-05 | End: 2019-11-07 | Stop reason: HOSPADM

## 2019-11-05 RX ADMIN — Medication 10 ML: at 09:11

## 2019-11-05 RX ADMIN — CARVEDILOL 6.25 MG: 6.25 TABLET, FILM COATED ORAL at 09:11

## 2019-11-05 RX ADMIN — ARFORMOTEROL TARTRATE 15 MCG: 15 SOLUTION RESPIRATORY (INHALATION) at 08:11

## 2019-11-05 RX ADMIN — CARVEDILOL 6.25 MG: 6.25 TABLET, FILM COATED ORAL at 08:11

## 2019-11-05 RX ADMIN — IPRATROPIUM BROMIDE AND ALBUTEROL SULFATE 3 ML: .5; 3 SOLUTION RESPIRATORY (INHALATION) at 07:11

## 2019-11-05 RX ADMIN — Medication 10 ML: at 06:11

## 2019-11-05 RX ADMIN — CARBIDOPA AND LEVODOPA 1 TABLET: 25; 100 TABLET ORAL at 09:11

## 2019-11-05 RX ADMIN — METRONIDAZOLE 500 MG: 500 INJECTION, SOLUTION INTRAVENOUS at 01:11

## 2019-11-05 RX ADMIN — IPRATROPIUM BROMIDE AND ALBUTEROL SULFATE 3 ML: .5; 3 SOLUTION RESPIRATORY (INHALATION) at 01:11

## 2019-11-05 RX ADMIN — GUAIFENESIN 600 MG: 600 TABLET, EXTENDED RELEASE ORAL at 12:11

## 2019-11-05 RX ADMIN — METRONIDAZOLE 500 MG: 500 INJECTION, SOLUTION INTRAVENOUS at 09:11

## 2019-11-05 RX ADMIN — CARBIDOPA AND LEVODOPA 1 TABLET: 25; 100 TABLET ORAL at 08:11

## 2019-11-05 RX ADMIN — AMOXICILLIN AND CLAVULANATE POTASSIUM 1 TABLET: 875; 125 TABLET, FILM COATED ORAL at 08:11

## 2019-11-05 RX ADMIN — ARFORMOTEROL TARTRATE 15 MCG: 15 SOLUTION RESPIRATORY (INHALATION) at 07:11

## 2019-11-05 RX ADMIN — Medication 10 ML: at 01:11

## 2019-11-05 RX ADMIN — CYANOCOBALAMIN 1000 MCG: 1000 INJECTION, SOLUTION INTRAMUSCULAR; SUBCUTANEOUS at 06:11

## 2019-11-05 RX ADMIN — FAMOTIDINE 20 MG: 20 TABLET ORAL at 08:11

## 2019-11-05 RX ADMIN — WARFARIN SODIUM 2.5 MG: 2.5 TABLET ORAL at 06:11

## 2019-11-05 RX ADMIN — IPRATROPIUM BROMIDE AND ALBUTEROL SULFATE 3 ML: .5; 3 SOLUTION RESPIRATORY (INHALATION) at 08:11

## 2019-11-05 RX ADMIN — GUAIFENESIN 600 MG: 600 TABLET, EXTENDED RELEASE ORAL at 08:11

## 2019-11-05 RX ADMIN — Medication 10 ML: at 12:11

## 2019-11-05 RX ADMIN — BUDESONIDE 0.5 MG: 0.5 INHALANT RESPIRATORY (INHALATION) at 08:11

## 2019-11-05 RX ADMIN — CARBIDOPA AND LEVODOPA 1 TABLET: 25; 100 TABLET ORAL at 01:11

## 2019-11-05 RX ADMIN — CARBIDOPA AND LEVODOPA 1 TABLET: 25; 100 TABLET ORAL at 02:11

## 2019-11-05 RX ADMIN — LISINOPRIL 20 MG: 20 TABLET ORAL at 09:11

## 2019-11-05 RX ADMIN — CEFEPIME HYDROCHLORIDE 2 G: 2 INJECTION, SOLUTION INTRAVENOUS at 04:11

## 2019-11-05 RX ADMIN — MELATONIN 1000 UNITS: at 09:11

## 2019-11-05 RX ADMIN — BUDESONIDE 0.5 MG: 0.5 INHALANT RESPIRATORY (INHALATION) at 07:11

## 2019-11-05 RX ADMIN — POLYETHYLENE GLYCOL (3350) 17 G: 17 POWDER, FOR SOLUTION ORAL at 09:11

## 2019-11-05 RX ADMIN — FAMOTIDINE 20 MG: 20 TABLET ORAL at 09:11

## 2019-11-05 NOTE — PLAN OF CARE
Pt remained free from falls or injuries, fall precautions in place, pt is on tele monitor see shift rounds. No other c/o at this time, PICC site wnl,  Call bell and personal belongings within reach. Hourly rounding completed. Instructed pt to call for assistance as needed. Will continue to monitor.

## 2019-11-05 NOTE — PROGRESS NOTES
[facility] Hospital Medicine  Progress Note    Patient Name: Shaq Huffman  MRN: [id number]  Patient Class: IP- Inpatient   Admission Date: 10/29/2019  Length of Stay: 7 days  Attending Physician: Kalen Allan MD  Primary Care Provider: Provider Notinsystem        Subjective:     Principal Problem:Aspiration pneumonia of right lower lobe due to gastric secretions        HPI:  Patient is an 85 y/o male with a PMH of parkinsons, afib, GERD, COPD presents as transfer from [facility] for respiratory failure. Patient was intubated prior to arrival and history was obtained by partner. She reports her and patient were on a cruise leaving out of [place] and patient developed some breathing difficulty. Cruise healthcare professionals were called patient was taken to [facility]. He was intubated there due to respiratory insufficiency with hypercarbia and transferred to [facility]. She denied any sick contacts or recent hospitalizations.     Overview/Hospital Course:  10/30:  Still on vent, cont current antibiotics, wean vent as tolerated    10/31:  Extubated this am, will continue to monitor    11/1:   Patient doing well, was informed by ICU staff that girlfriend states patient is a DNR. Discussion was had with Son (Shabbir Huffman) who is power of  [phone] and he confirmed DNR status. Patient likely to be stepped down from ICU today.   11/2: pt seen and examined, chart reviewed, d/w pt's partner and his son. Frail elderly man lying in bed, appears sick. NC and NGT in place, getting TF. CXR shows large R sided pneumonia, no dentures at present. WBC high with L shift. Bun up to 50.   11/3- looks lot better, more alert and responsive. Talking, getting TF via NGT, tolerating it well. Did PT/OT and moving all 4 ext in bed. Bun increased-- hence lasix decreased and free water increased via NGT. Hopefully will be able to take oral food in a day or two.   11/4- continues to make  progress, swallow improved, NG d/garo. Was able to eat pureed diet and swallow pills well, no sore throat. Getting stronger, will continue PT/OT. Will give inj B12 IM.  11/5- looks lot better, sitting up in chair, eating drinking a little, walked 35 feet with PT, feels getting stronger. Still has some Phlegm in chest. Ordered Chest PT, IS and Mucinex.     Interval History: looks lot better, sitting up in chair, eating drinking a little, walked 35 feet with PT, feels getting stronger. Still has some Phlegm in chest. Ordered Chest PT, IS and Mucinex.     Review of Systems   Unable to perform ROS: Acuity of condition   Constitutional: Positive for activity change and appetite change. Negative for fatigue and fever.   HENT: Positive for congestion.    Eyes: Negative.    Respiratory: Positive for cough and shortness of breath.    Cardiovascular: Negative for palpitations and leg swelling.   Gastrointestinal: Negative.  Negative for nausea and vomiting.   Endocrine: Negative.    Genitourinary: Negative.    Musculoskeletal: Positive for gait problem.   Skin: Negative for rash.   Neurological: Positive for weakness.     Objective:     Vital Signs (Most Recent):  Temp: 97.6 °F (36.4 °C) (11/05/19 1620)  Pulse: 88 (11/05/19 1620)  Resp: 18 (11/05/19 1620)  BP: (!) 166/93 (11/05/19 1620)  SpO2: 95 % (11/05/19 1620) Vital Signs (24h Range):  Temp:  [97.5 °F (36.4 °C)-98.7 °F (37.1 °C)] 97.6 °F (36.4 °C)  Pulse:  [66-92] 88  Resp:  [14-20] 18  SpO2:  [89 %-95 %] 95 %  BP: ()/(62-95) 166/93     Weight: 63.8 kg (140 lb 10.5 oz)  Body mass index is 17.58 kg/m².    Intake/Output Summary (Last 24 hours) at 11/5/2019 1716  Last data filed at 11/5/2019 1630  Gross per 24 hour   Intake 1737 ml   Output 1525 ml   Net 212 ml      Physical Exam   Constitutional: He appears lethargic. He has a sickly appearance. No distress. Nasal cannula in place.   Frail elderly man looks better, NGT out   HENT:   Head: Normocephalic and atraumatic.    edentulous   Eyes: Pupils are equal, round, and reactive to light. Conjunctivae and EOM are normal.   Neck: Normal range of motion. Neck supple.   Cardiovascular: Exam reveals no gallop and no friction rub.   No murmur heard.  Irregular irregular   Pulmonary/Chest: No stridor. No respiratory distress. He has no wheezes. He has rales.   Abdominal: Soft. Bowel sounds are normal. He exhibits no distension and no mass. There is no tenderness. There is no guarding.   Genitourinary:   Genitourinary Comments: deferred   Musculoskeletal: He exhibits no edema.   Neurological: He appears lethargic.   Skin: Skin is warm. No rash noted. He is not diaphoretic.   Psychiatric: He has a normal mood and affect. His behavior is normal.   Nursing note and vitals reviewed.      Significant Labs:   BMP:   Recent Labs   Lab 11/05/19  0432      *   K 3.8   *   CO2 27   BUN 71*   CREATININE 0.9   CALCIUM 9.5   MG 3.1*     CBC:   Recent Labs   Lab 11/04/19  0408 11/05/19  0432   WBC 11.03 10.18   HGB 14.0 15.5   HCT 43.1 43.1    185     All pertinent labs within the past 24 hours have been reviewed.    Significant Imaging: I have reviewed all pertinent imaging results/findings within the past 24 hours.      Assessment/Plan:      * Aspiration pneumonia of right lower lobe due to gastric secretions  10/31:  Continue vanc, cefepime, and flagyl  procalcitonin trending up  WBC within normal limits  Chest xray ordered  Continue to monitor for response  Repeat procalcitonin in am  Sputum culture normal mikel    11/1:  Currently on vanc, cefepime and flagyl   procalcitonin repeated today trending down   Continue current abx  Plan to dc vanc in am   Continue to monitor procal for response   Blood cultures no growth  Will start PT/OT prior to discharge  Discussion was had with son who states he can come down and drive patient up to the [facility] [place]   Informed son that if patient is stable for discharge he can likely fly  home    Looks quiet sick and frail, still has congestion, very weak and easily dyspneic  Continue present care  Prognosis guarded to poor.    11/3- improving  Continue present care    11/4- improving, continue present care  11/5- augmentin started by ID    New onset of congestive heart failure  11/1:  Reviewed echo  No history of chf reported  EF of 30% here  Will consult cards on case    On IV lasix, dose reduced    Getting better      Physical deconditioning  11/1:  Will start PT/OT/ST to evaluate for any possible needs     Critical Care Myopathy    Continue PT/OT      Parkinson disease  10/29:  Resume home meds      Panlobular emphysema  On nebs      Acute respiratory failure with hypoxia  10/29:  Likely 2/2 to aspiration PNA  Sputum culture pending  Blood culture pending  On vanc, cefepime, and flagyl  Continue to wean vent as tolerated   Reviewed blood gas     10/30:  Currently on vent   Sputum cultures pending  Continue current antibiotics   Wean vent as tolerated   Reviewed blood gas today  Will order procal for trend in am     S/p vent support, continue supportive care  Continue steroids    Improving    Will check Home o2 eval in am    Home O2 eval in am  Repeat CXR       Atrial fibrillation with RVR  10/29:  Not on rate control medical medications at home  HR ok for now  On coumadin   Pharm to dose   Metoprolol IV PRN     10/30:  Rate controlled     10/31:  Controlled     11/1:  Rate controlled, coumadin managed by pharm     Remains in Afib, rate controlled    Rate controlled      VTE Risk Mitigation (From admission, onward)         Ordered     warfarin (COUMADIN) tablet 2.5 mg  Daily      11/02/19 1041     Place sequential compression device  Until discontinued      10/29/19 1630     IP VTE HIGH RISK PATIENT  Once      10/29/19 1630                      Kalen Allan MD  Department of Hospital Medicine   [facility]

## 2019-11-05 NOTE — ASSESSMENT & PLAN NOTE
10/29:  Not on rate control medical medications at home  HR ok for now  On coumadin   Pharm to dose   Metoprolol IV PRN     10/30:  Rate controlled     10/31:  Controlled     11/1:  Rate controlled, coumadin managed by pharm     Remains in Afib, rate controlled

## 2019-11-05 NOTE — PROGRESS NOTES
[facility]  Cardiology  Progress Note    Patient Name: Shaq Huffman  MRN: [id number]  Admission Date: 10/29/2019  Hospital Length of Stay: 7 days  Code Status: DNR   Attending Physician: Kalen Allan MD   Primary Care Physician: Provider Notinsystem  Expected Discharge Date:   Principal Problem:Aspiration pneumonia of right lower lobe due to gastric secretions    Subjective:   Brief HPI:  Mr. Huffman is an 86 year old male patient whose current medical conditions include Parkinson's disease, chronic afib (on Coumadin), GERD, and COPD who was transferred to [facility] from outside facility due to respiratory failure. Patient was on board a [place] [employer] cruise when he began to complain of progressively worsening SOB. Associated symptoms included coughing and lethargy. EMT was called and subsequently sent patient to ED. His respiratory status continued to decline and he was subsequently intubated. Initial workup in ED revealed troponin of 0.029>0.159, BNP of 353, lactic acidosis (2.7), and positive procalcitonin. CXR showed RLL infiltrate concerning for aspiration and coarse interstitial markings. EKG showed afib with RVR with HR in 150's and patient was subsequently admitted for further evaluation and treatment. Cardiology consulted to assist with management. Patient seen and examined today, sitting up in bed. Still complains of SOB. Tachypneic and in mild respiratory distress at time of exam. Remains chest pain free. He reports compliance with his medications. Chart reviewed. Troponin trending down. 2D echo showed EF of 25-40%, DD, and RVE with moderately depressed systolic function, mild AS.  INR 2.6.      Hospital Course:   11/3/19-Patient seen and examined today. Feeling better, less SOB. More responsive. No chest pain. Good diuresis overnight. Labs reviewed. Creatinine stable, BUN, 66. K-3.3. INR 2.2.     11/4/19-Patient seen and examined today. Improving. SOB better. Worked with PT/OT this  AM. No chest pain.  Labs reviewed. Creatinine 1.0, BUN 77, Na 148, K 3.1. INR 2.2.    11/5/19-patient OOB in chair this morning. Ambulated some with PT today. NGT has been DC'd. Still having some SOB this morning. Has no chest pain.         Review of Systems   Constitution: Positive for malaise/fatigue.   HENT: Negative.    Eyes: Negative.    Cardiovascular: Positive for dyspnea on exertion.   Respiratory: Positive for cough and shortness of breath.    Endocrine: Negative.    Hematologic/Lymphatic: Bruises/bleeds easily.   Skin: Negative.    Musculoskeletal: Negative.    Gastrointestinal: Negative.    Genitourinary: Negative.    Neurological: Negative.    Psychiatric/Behavioral: Negative.    Allergic/Immunologic: Negative.      Objective:     Vital Signs (Most Recent):  Temp: 97.5 °F (36.4 °C) (11/05/19 0509)  Pulse: 88 (11/05/19 1229)  Resp: 18 (11/05/19 1229)  BP: 99/62 (11/05/19 1229)  SpO2: (!) 91 % (11/05/19 1229) Vital Signs (24h Range):  Temp:  [97.5 °F (36.4 °C)-98.7 °F (37.1 °C)] 97.5 °F (36.4 °C)  Pulse:  [79-93] 88  Resp:  [14-20] 18  SpO2:  [89 %-95 %] 91 %  BP: ()/(62-95) 99/62     Weight: 63.8 kg (140 lb 10.5 oz)  Body mass index is 17.58 kg/m².     SpO2: (!) 91 %  O2 Device (Oxygen Therapy): nasal cannula      Intake/Output Summary (Last 24 hours) at 11/5/2019 1324  Last data filed at 11/5/2019 0600  Gross per 24 hour   Intake 447 ml   Output 925 ml   Net -478 ml       Lines/Drains/Airways     Peripherally Inserted Central Catheter Line                 PICC Double Lumen 10/30/19 0445 left brachial 6 days          Drain                 Urethral Catheter 10/29/19 1440 16 Fr. 6 days                Physical Exam   Constitutional: He is oriented to person, place, and time. He appears well-developed. No distress.   Cachectic, frail appearing   HENT:   Head: Normocephalic and atraumatic.   Eyes: Pupils are equal, round, and reactive to light. Right eye exhibits no discharge. Left eye exhibits no  discharge.   Neck: Neck supple. No JVD present.   Cardiovascular: Normal rate, S1 normal, S2 normal and normal heart sounds. An irregularly irregular rhythm present. Exam reveals no S4.   No murmur heard.  Pulmonary/Chest: Effort normal. No respiratory distress. He has no wheezes.   Rhonchi   Abdominal: Soft. He exhibits no distension.   Musculoskeletal: He exhibits no edema.   Neurological: He is alert and oriented to person, place, and time.   Skin: Skin is warm and dry. He is not diaphoretic. No erythema.   Psychiatric: He has a normal mood and affect. His behavior is normal. Thought content normal.   Nursing note and vitals reviewed.      Significant Labs:   All pertinent lab results from the last 24 hours have been reviewed. and   Recent Lab Results       11/05/19  0432        Anion Gap 11     Baso # 0.03     Basophil% 0.3     BUN, Bld 71     Calcium 9.5     Chloride 111     CO2 27     Creatinine 0.9     Differential Method Automated     eGFR if  >60     eGFR if non  >60  Comment:  Calculation used to obtain the estimated glomerular filtration  rate (eGFR) is the CKD-EPI equation.        Eos # 0.1     Eosinophil% 1.4     Glucose 101     Gran # (ANC) 7.2     Gran% 70.2     Hematocrit 43.1     Hemoglobin 15.5     Immature Grans (Abs) 0.26  Comment:  Mild elevation in immature granulocytes is non specific and   can be seen in a variety of conditions including stress response,   acute inflammation, trauma and pregnancy. Correlation with other   laboratory and clinical findings is essential.       Immature Granulocytes 2.6     Coumadin Monitoring INR 2.2  Comment:  Coumadin Therapy:  2.0 - 3.0 for INR for all indicators except mechanical heart valves  and antiphospholipid syndromes which should use 2.5 - 3.5.       Lymph # 1.2     Lymph% 11.6     Magnesium 3.1     MCH 35.4     MCHC 36.0     MCV 98     Mono # 1.4     Mono% 13.9     MPV 9.8     nRBC 0     Platelets 185     Potassium 3.8      Protime 23.5     RBC 4.38     RDW 14.3     Sodium 149     WBC 10.18           Significant Imaging: Echocardiogram: 2D echo with color flow doppler: No results found for this or any previous visit.    Assessment and Plan:       * Aspiration pneumonia of right lower lobe due to gastric secretions  -Mgmt as per hospital medicine  -On abx    New onset of congestive heart failure  -2D echo showed EF of 35-40%, DD, RVE with moderately depressed systolic function  -Diurese with IV Lasix 40 mg BID  -Continue BB, ACEi  -Will need ischemic evaluation once respiratory status improves    11/3/19  -Continue IV diuresis for additional day  -Continue BB, ACEi    11/4/19  -Lasix d/c'd due to rising BUN  -Continue BB, ACEi  -Optimize regimen further as clinical condition permits    11/5/19  -Continue metoprolol and ACEI  -Lasix on hold due to bump in BUN  -Recommend OP stress test with primary Cardiologist in [place]   -Will be be available PRN     Parkinson disease  -Mgmt as per hospital medicine    Panlobular emphysema  -Mgmt as per hospital medicine  -Improving    Atrial fibrillation with RVR  -HR elevated in setting of respiratory distress/underlyling illness  -Continue Coreg for rate-control  -Up titrate if BP permits  -Continue Coumadin for CVA prophylaxis    11/3/19  -Remains in afib, rate-controlled  -Continue BB  -Continue Coumadin, dosing as per pharmacy    11/4/19  Stable, see above    11/5/19  -continue BB and coumadin           VTE Risk Mitigation (From admission, onward)         Ordered     warfarin (COUMADIN) tablet 2.5 mg  Daily      11/02/19 1041     Place sequential compression device  Until discontinued      10/29/19 1630     IP VTE HIGH RISK PATIENT  Once      10/29/19 1630              Chart reviewed. Patient examined by Dr. Correa and agrees with plan that has been outlined.     Govind Mac, [email or web address]  Cardiology  [facility]

## 2019-11-05 NOTE — ASSESSMENT & PLAN NOTE
10/31:  Continue vanc, cefepime, and flagyl  procalcitonin trending up  WBC within normal limits  Chest xray ordered  Continue to monitor for response  Repeat procalcitonin in am  Sputum culture normal mikel    11/1:  Currently on vanc, cefepime and flagyl   procalcitonin repeated today trending down   Continue current abx  Plan to dc vanc in am   Continue to monitor procal for response   Blood cultures no growth  Will start PT/OT prior to discharge  Discussion was had with son who states he can come down and drive patient up to the [facility] [place]   Informed son that if patient is stable for discharge he can likely fly home    Looks quiet sick and frail, still has congestion, very weak and easily dyspneic  Continue present care  Prognosis guarded to poor.    11/3- improving  Continue present care    11/4- improving, continue present care

## 2019-11-05 NOTE — ASSESSMENT & PLAN NOTE
10/29:  Likely 2/2 to aspiration PNA  Sputum culture pending  Blood culture pending  On vanc, cefepime, and flagyl  Continue to wean vent as tolerated   Reviewed blood gas     10/30:  Currently on vent   Sputum cultures pending  Continue current antibiotics   Wean vent as tolerated   Reviewed blood gas today  Will order procal for trend in am     S/p vent support, continue supportive care  Continue steroids    Improving    Will check Home o2 eval in am    Home O2 eval in am  Repeat CXR

## 2019-11-05 NOTE — PLAN OF CARE
Pt c/o of throat soreness & struggle swallowing.   Interventions performed: SLP contacted, diet changed, oral intake monitored.  Pt tolerated being up in chair, thickened liquids, & pureed diet.  Mobility status: Moderate assist.   Patient AFIB on monitor.   Safety maintained per pt.  Pt aware of POC.  Will continue to monitor.

## 2019-11-05 NOTE — SUBJECTIVE & OBJECTIVE
Interval History: looks lot better, sitting up in chair, eating drinking a little, walked 35 feet with PT, feels getting stronger. Still has some Phlegm in chest. Ordered Chest PT, IS and Mucinex.     Review of Systems   Unable to perform ROS: Acuity of condition   Constitutional: Positive for activity change and appetite change. Negative for fatigue and fever.   HENT: Positive for congestion.    Eyes: Negative.    Respiratory: Positive for cough and shortness of breath.    Cardiovascular: Negative for palpitations and leg swelling.   Gastrointestinal: Negative.  Negative for nausea and vomiting.   Endocrine: Negative.    Genitourinary: Negative.    Musculoskeletal: Positive for gait problem.   Skin: Negative for rash.   Neurological: Positive for weakness.     Objective:     Vital Signs (Most Recent):  Temp: 97.6 °F (36.4 °C) (11/05/19 1620)  Pulse: 88 (11/05/19 1620)  Resp: 18 (11/05/19 1620)  BP: (!) 166/93 (11/05/19 1620)  SpO2: 95 % (11/05/19 1620) Vital Signs (24h Range):  Temp:  [97.5 °F (36.4 °C)-98.7 °F (37.1 °C)] 97.6 °F (36.4 °C)  Pulse:  [66-92] 88  Resp:  [14-20] 18  SpO2:  [89 %-95 %] 95 %  BP: ()/(62-95) 166/93     Weight: 63.8 kg (140 lb 10.5 oz)  Body mass index is 17.58 kg/m².    Intake/Output Summary (Last 24 hours) at 11/5/2019 1716  Last data filed at 11/5/2019 1630  Gross per 24 hour   Intake 1737 ml   Output 1525 ml   Net 212 ml      Physical Exam   Constitutional: He appears lethargic. He has a sickly appearance. No distress. Nasal cannula in place.   Frail elderly man looks better, NGT out   HENT:   Head: Normocephalic and atraumatic.   edentulous   Eyes: Pupils are equal, round, and reactive to light. Conjunctivae and EOM are normal.   Neck: Normal range of motion. Neck supple.   Cardiovascular: Exam reveals no gallop and no friction rub.   No murmur heard.  Irregular irregular   Pulmonary/Chest: No stridor. No respiratory distress. He has no wheezes. He has rales.   Abdominal: Soft.  Bowel sounds are normal. He exhibits no distension and no mass. There is no tenderness. There is no guarding.   Genitourinary:   Genitourinary Comments: deferred   Musculoskeletal: He exhibits no edema.   Neurological: He appears lethargic.   Skin: Skin is warm. No rash noted. He is not diaphoretic.   Psychiatric: He has a normal mood and affect. His behavior is normal.   Nursing note and vitals reviewed.      Significant Labs:   BMP:   Recent Labs   Lab 11/05/19  0432      *   K 3.8   *   CO2 27   BUN 71*   CREATININE 0.9   CALCIUM 9.5   MG 3.1*     CBC:   Recent Labs   Lab 11/04/19  0408 11/05/19  0432   WBC 11.03 10.18   HGB 14.0 15.5   HCT 43.1 43.1    185     All pertinent labs within the past 24 hours have been reviewed.    Significant Imaging: I have reviewed all pertinent imaging results/findings within the past 24 hours.

## 2019-11-05 NOTE — ASSESSMENT & PLAN NOTE
-2D echo showed EF of 35-40%, DD, RVE with moderately depressed systolic function  -Diurese with IV Lasix 40 mg BID  -Continue BB, ACEi  -Will need ischemic evaluation once respiratory status improves    11/3/19  -Continue IV diuresis for additional day  -Continue BB, ACEi    11/4/19  -Lasix d/c'd due to rising BUN  -Continue BB, ACEi  -Optimize regimen further as clinical condition permits    11/5/19  -Continue metoprolol and ACEI  -Lasix on hold due to bump in BUN  -Recommend OP stress test with primary Cardiologist in [place]   -Will be be available PRN

## 2019-11-05 NOTE — PT/OT/SLP PROGRESS
Occupational Therapy  Treatment    Shaq Huffman   MRN: [id number]   Admitting Diagnosis: Aspiration pneumonia of right lower lobe due to gastric secretions    OT Date of Treatment: 11/05/19   OT Start Time: 0930  OT Stop Time: 0953  OT Total Time (min): 23 min    Billable Minutes:  Therapeutic Activity 23 minutes    General Precautions: Standard, aspiration, fall  Orthopedic Precautions: N/A  Braces: N/A         Subjective:  Communicated with nurse and epic chart review prior to session.    Pain/Comfort  Pain Rating 1: 0/10    Objective:  Patient found with: bed alarm, zuluaga catheter, telemetry, oxygen, NG tube, peripheral IV     Functional Mobility:  Transfers:   Min a with sit<>stand  Mod a with  With step transfer bed> bedside chair    Functional Ambulation: pt ambulated 35 feet with mod a with rw  And max vc's for big steps, widen base of support.     Activities of Daily Living:     Feeding adaptive equipment: na     UE adaptive equipment: min a      LE adaptive equipment: na  Bathing adaptive equipment: na    Balance:   Static Sit: FAIR+: Able to take MINIMAL challenges from all directions  Dynamic Sit: FAIR: Cannot move trunk without losing balance  Static Stand: POOR+: Needs MINIMAL assist to maintain  Dynamic stand: POOR: Needs MOD (moderate) assist during gait    Therapeutic Activities and Exercises:  Pt educated on hand placement during transfers.     AM-PAC 6 CLICK ADL   How much help from another person does this patient currently need?   1 = Unable, Total/Dependent Assistance  2 = A lot, Maximum/Moderate Assistance  3 = A little, Minimum/Contact Guard/Supervision  4 = None, Modified [place]/Independent    Putting on and taking off regular lower body clothing? : 1  Bathing (including washing, rinsing, drying)?: 1  Toileting, which includes using toilet, bedpan, or urinal? : 1(zuluaga placement)  Putting on and taking off regular upper body clothing?: 2  Taking care of personal grooming such as  "brushing teeth?: 2  Eating meals?: 2  Daily Activity Total Score: 9     AM-PAC Raw Score CMS "G-Code Modifier Level of Impairment Assistance   6 % Total / Unable   7 - 8 CM 80 - 100% Maximal Assist   9-13 CL 60 - 80% Moderate Assist   14 - 19 CK 40 - 60% Moderate Assist   20 - 22 CJ 20 - 40% Minimal Assist   23 CI 1-20% SBA / CGA   24 CH 0% Independent/ Mod I       Patient left up in chair with all lines intact, call button in reach, nurse notified and spouse present    ASSESSMENT:  Shaq Huffman is a 86 y.o. male with a medical diagnosis of Aspiration pneumonia of right lower lobe due to gastric secretions and presents with debility and generalized weakness. Pt will continue with POC.    Rehab identified problem list/impairments: Rehab identified problem list/impairments: weakness, impaired functional mobilty, impaired balance, decreased upper extremity function, decreased safety awareness, impaired endurance, impaired self care skills, gait instability    Rehab potential is good.    Activity tolerance: Good    Discharge recommendations: Discharge Facility/Level of Care Needs: rehabilitation facility     Barriers to discharge:      Equipment recommendations: walker, rolling     GOALS:   Multidisciplinary Problems     Occupational Therapy Goals        Problem: Occupational Therapy Goal    Goal Priority Disciplines Outcome Interventions   Occupational Therapy Goal     OT, PT/OT Ongoing, Progressing    Description:  LTGs to be met by 11/9/19  1. Pt will perform BSC t/f with Mod A  2. Pt will perform LE dressing with Mod A  3. Pt will perform UE dressing with Min A  4. Pt will perform (B) UE ROM exercises 1 x 20 reps                    Plan:  Patient to be seen 3 x/week to address the above listed problems via self-care/home management, therapeutic activities, therapeutic exercises  Plan of Care expires:    Plan of Care reviewed with: patient, family         Aylin Vital OT  11/05/2019  "

## 2019-11-05 NOTE — ASSESSMENT & PLAN NOTE
10/31:  Continue vanc, cefepime, and flagyl  procalcitonin trending up  WBC within normal limits  Chest xray ordered  Continue to monitor for response  Repeat procalcitonin in am  Sputum culture normal mikel    11/1:  Currently on vanc, cefepime and flagyl   procalcitonin repeated today trending down   Continue current abx  Plan to dc vanc in am   Continue to monitor procal for response   Blood cultures no growth  Will start PT/OT prior to discharge  Discussion was had with son who states he can come down and drive patient up to the [facility]   Informed son that if patient is stable for discharge he can likely fly home    Looks quiet sick and frail, still has congestion, very weak and easily dyspneic  Continue present care  Prognosis guarded to poor.    11/3- improving  Continue present care    11/4- improving, continue present care  11/5- augmentin started by ID

## 2019-11-05 NOTE — PT/OT/SLP PROGRESS
Physical Therapy  Treatment    Shaq Huffman   MRN: [id number]   Admitting Diagnosis: Aspiration pneumonia of right lower lobe due to gastric secretions    PT Received On: 11/05/19  PT Start Time: 0930     PT Stop Time: 0955    PT Total Time (min): 25 min       Billable Minutes:  Gait Training 15 min and Therapeutic Activity 10 min    Treatment Type: Treatment  PT/PTA: PT     PTA Visit Number: 0       General Precautions: Standard, fall, aspiration  Orthopedic Precautions: N/A   Braces: N/A         Subjective:  Communicated with Nurse Mclaughlin and epic chart review prior to session.  Pt found supine in bed and agreeable to tx at this time.    Pain/Comfort  Pain Rating 1: 0/10  Pain Rating Post-Intervention 1: 0/10    Objective:   Patient found with: bed alarm, zuluaga catheter, telemetry, peripheral IV, oxygen    Functional Mobility:  Therapeutic Activities and Exercises:  Pt performed supine>sit with Min A with extended time, scooted to EOB with SBA, donned robe with Mod A, sit>stand using RW with Mod A, ambulated ~35ft using RW with Mod A, t/f to chair using RW with Min A. Pt required verbal cues to increase step length and widen gait pattern, and tactile cues for RW safety.     AM-PAC 6 CLICK MOBILITY  How much help from another person does this patient currently need?   1 = Unable, Total/Dependent Assistance  2 = A lot, Maximum/Moderate Assistance  3 = A little, Minimum/Contact Guard/Supervision  4 = None, Modified [place]/Independent    Turning over in bed (including adjusting bedclothes, sheets and blankets)?: 3  Sitting down on and standing up from a chair with arms (e.g., wheelchair, bedside commode, etc.): 3  Moving from lying on back to sitting on the side of the bed?: 3  Moving to and from a bed to a chair (including a wheelchair)?: 3  Need to walk in hospital room?: 3  Climbing 3-5 steps with a railing?: 1  Basic Mobility Total Score: 16    AM-PAC Raw Score CMS G-Code Modifier Level of Impairment  Assistance   6 % Total / Unable   7 - 9 CM 80 - 100% Maximal Assist   10 - 14 CL 60 - 80% Moderate Assist   15 - 19 CK 40 - 60% Moderate Assist   20 - 22 CJ 20 - 40% Minimal Assist   23 CI 1-20% SBA / CGA   24 CH 0% Independent/ Mod I     Patient left up in chair with all lines intact, call button in reach, chair alarm on, Nurse Lissette notified and family present.    Assessment:  Shaq Huffman is a 86 y.o. male with a medical diagnosis of Aspiration pneumonia of right lower lobe due to gastric secretions and presents with  impaired functional mobility. Pt will benefit from continued skilled PT in order to address impairments.     Rehab identified problem list/impairments: Rehab identified problem list/impairments: weakness, impaired endurance, gait instability, impaired functional mobilty, impaired self care skills, impaired balance, decreased coordination, decreased upper extremity function, decreased lower extremity function, decreased safety awareness, pain    Rehab potential is good.    Activity tolerance: Good    Discharge recommendations: Discharge Facility/Level of Care Needs: rehabilitation facility     Barriers to discharge:      Equipment recommendations: Equipment Needed After Discharge: walker, rolling     GOALS:   Multidisciplinary Problems     Physical Therapy Goals        Problem: Physical Therapy Goal    Goal Priority Disciplines Outcome Goal Variances Interventions   Physical Therapy Goal     PT, PT/OT Ongoing, Progressing     Description:  1. Patient will perform supine to/from sit mod A  2. Patient will perform sit to/from stand with RW mod A  3. Patient will amb 25ft RW mod A                    PLAN:    Patient to be seen 5 x/week  to address the above listed problems via gait training, therapeutic activities, therapeutic exercises  Plan of Care expires: 11/09/19  Plan of Care reviewed with: patient, significant other    PT G-Codes  Functional Assessment Tool Used: [facility]  Score:  16    Kalyn Jose, PT/OT  11/05/2019

## 2019-11-05 NOTE — SUBJECTIVE & OBJECTIVE
Review of Systems   Constitution: Positive for malaise/fatigue.   HENT: Negative.    Eyes: Negative.    Cardiovascular: Positive for dyspnea on exertion.   Respiratory: Positive for cough and shortness of breath.    Endocrine: Negative.    Hematologic/Lymphatic: Bruises/bleeds easily.   Skin: Negative.    Musculoskeletal: Negative.    Gastrointestinal: Negative.    Genitourinary: Negative.    Neurological: Negative.    Psychiatric/Behavioral: Negative.    Allergic/Immunologic: Negative.      Objective:     Vital Signs (Most Recent):  Temp: 97.5 °F (36.4 °C) (11/05/19 0509)  Pulse: 88 (11/05/19 1229)  Resp: 18 (11/05/19 1229)  BP: 99/62 (11/05/19 1229)  SpO2: (!) 91 % (11/05/19 1229) Vital Signs (24h Range):  Temp:  [97.5 °F (36.4 °C)-98.7 °F (37.1 °C)] 97.5 °F (36.4 °C)  Pulse:  [79-93] 88  Resp:  [14-20] 18  SpO2:  [89 %-95 %] 91 %  BP: ()/(62-95) 99/62     Weight: 63.8 kg (140 lb 10.5 oz)  Body mass index is 17.58 kg/m².     SpO2: (!) 91 %  O2 Device (Oxygen Therapy): nasal cannula      Intake/Output Summary (Last 24 hours) at 11/5/2019 1324  Last data filed at 11/5/2019 0600  Gross per 24 hour   Intake 447 ml   Output 925 ml   Net -478 ml       Lines/Drains/Airways     Peripherally Inserted Central Catheter Line                 PICC Double Lumen 10/30/19 0445 left brachial 6 days          Drain                 Urethral Catheter 10/29/19 1440 16 Fr. 6 days                Physical Exam   Constitutional: He is oriented to person, place, and time. He appears well-developed. No distress.   Cachectic, frail appearing   HENT:   Head: Normocephalic and atraumatic.   Eyes: Pupils are equal, round, and reactive to light. Right eye exhibits no discharge. Left eye exhibits no discharge.   Neck: Neck supple. No JVD present.   Cardiovascular: Normal rate, S1 normal, S2 normal and normal heart sounds. An irregularly irregular rhythm present. Exam reveals no S4.   No murmur heard.  Pulmonary/Chest: Effort normal. No  respiratory distress. He has no wheezes.   Rhonchi   Abdominal: Soft. He exhibits no distension.   Musculoskeletal: He exhibits no edema.   Neurological: He is alert and oriented to person, place, and time.   Skin: Skin is warm and dry. He is not diaphoretic. No erythema.   Psychiatric: He has a normal mood and affect. His behavior is normal. Thought content normal.   Nursing note and vitals reviewed.      Significant Labs:   All pertinent lab results from the last 24 hours have been reviewed. and   Recent Lab Results       11/05/19  0432        Anion Gap 11     Baso # 0.03     Basophil% 0.3     BUN, Bld 71     Calcium 9.5     Chloride 111     CO2 27     Creatinine 0.9     Differential Method Automated     eGFR if  >60     eGFR if non  >60  Comment:  Calculation used to obtain the estimated glomerular filtration  rate (eGFR) is the CKD-EPI equation.        Eos # 0.1     Eosinophil% 1.4     Glucose 101     Gran # (ANC) 7.2     Gran% 70.2     Hematocrit 43.1     Hemoglobin 15.5     Immature Grans (Abs) 0.26  Comment:  Mild elevation in immature granulocytes is non specific and   can be seen in a variety of conditions including stress response,   acute inflammation, trauma and pregnancy. Correlation with other   laboratory and clinical findings is essential.       Immature Granulocytes 2.6     Coumadin Monitoring INR 2.2  Comment:  Coumadin Therapy:  2.0 - 3.0 for INR for all indicators except mechanical heart valves  and antiphospholipid syndromes which should use 2.5 - 3.5.       Lymph # 1.2     Lymph% 11.6     Magnesium 3.1     MCH 35.4     MCHC 36.0     MCV 98     Mono # 1.4     Mono% 13.9     MPV 9.8     nRBC 0     Platelets 185     Potassium 3.8     Protime 23.5     RBC 4.38     RDW 14.3     Sodium 149     WBC 10.18           Significant Imaging: Echocardiogram: 2D echo with color flow doppler: No results found for this or any previous visit.

## 2019-11-05 NOTE — ASSESSMENT & PLAN NOTE
10/29:  Likely 2/2 to aspiration PNA  Sputum culture pending  Blood culture pending  On vanc, cefepime, and flagyl  Continue to wean vent as tolerated   Reviewed blood gas     10/30:  Currently on vent   Sputum cultures pending  Continue current antibiotics   Wean vent as tolerated   Reviewed blood gas today  Will order procal for trend in am     S/p vent support, continue supportive care  Continue steroids    Improving    Will check Home o2 eval in am

## 2019-11-05 NOTE — PROGRESS NOTES
Pharmacy Coumadin Consult Note    INR=2.2 (stable for 3 days!)  Hx of AFib  Goal INR=2-3    Current dose: Warfarin 2.5mg daily  Continue current dose    Patient has been educated.  Daily INR ordered  Pharmacy is consulted to dose.    Pharmacy will monitor daily INR levels and make dosage adjustments when necessary.  Thank you for allowing us to participate in this patient's care.  Destiny Dan, Pharm D 11/5/2019 9:45 AM

## 2019-11-05 NOTE — ASSESSMENT & PLAN NOTE
-HR elevated in setting of respiratory distress/underlyling illness  -Continue Coreg for rate-control  -Up titrate if BP permits  -Continue Coumadin for CVA prophylaxis    11/3/19  -Remains in afib, rate-controlled  -Continue BB  -Continue Coumadin, dosing as per pharmacy    11/4/19  Stable, see above    11/5/19  -continue BB and coumadin

## 2019-11-05 NOTE — SUBJECTIVE & OBJECTIVE
Interval History: continues to make progress, swallow improved, NG d/garo. Was able to eat pureed diet and swallow pills well, no sore throat. Getting stronger, will continue PT/OT. Will give inj B12 IM.    Review of Systems   Unable to perform ROS: Acuity of condition   Constitutional: Positive for activity change and appetite change. Negative for fatigue and fever.   HENT: Positive for congestion.    Eyes: Negative.    Respiratory: Positive for cough and shortness of breath.    Cardiovascular: Negative for palpitations and leg swelling.   Gastrointestinal: Negative.  Negative for nausea and vomiting.   Endocrine: Negative.    Genitourinary: Negative.    Musculoskeletal: Positive for gait problem.   Skin: Negative for rash.   Neurological: Positive for weakness.     Objective:     Vital Signs (Most Recent):  Temp: 98.2 °F (36.8 °C) (11/04/19 1535)  Pulse: 84 (11/04/19 1700)  Resp: 20 (11/04/19 1535)  BP: 134/89 (11/04/19 1535)  SpO2: (!) 93 % (11/04/19 1535) Vital Signs (24h Range):  Temp:  [96.9 °F (36.1 °C)-98.2 °F (36.8 °C)] 98.2 °F (36.8 °C)  Pulse:  [] 84  Resp:  [14-22] 20  SpO2:  [89 %-95 %] 93 %  BP: (122-152)/(60-99) 134/89     Weight: 69.8 kg (153 lb 14.1 oz)  Body mass index is 19.23 kg/m².    Intake/Output Summary (Last 24 hours) at 11/4/2019 1902  Last data filed at 11/4/2019 1200  Gross per 24 hour   Intake 746 ml   Output 2000 ml   Net -1254 ml      Physical Exam   Constitutional: He appears lethargic. He has a sickly appearance. No distress. Nasal cannula in place.   Frail elderly man looks better, NGT out   HENT:   Head: Normocephalic and atraumatic.   edentulous   Eyes: Pupils are equal, round, and reactive to light. Conjunctivae and EOM are normal.   Neck: Normal range of motion. Neck supple.   Cardiovascular: Exam reveals no gallop and no friction rub.   No murmur heard.  Irregular irregular   Pulmonary/Chest: No stridor. No respiratory distress. He has no wheezes. He has rales.    Abdominal: Soft. Bowel sounds are normal. He exhibits no distension and no mass. There is no tenderness. There is no guarding.   Genitourinary:   Genitourinary Comments: deferred   Musculoskeletal: He exhibits no edema.   Neurological: He appears lethargic.   Skin: Skin is warm. No rash noted. He is not diaphoretic.   Nursing note and vitals reviewed.      Significant Labs:   Blood Culture:   BMP:   Recent Labs   Lab 11/04/19  0408   *   *   K 3.1*      CO2 29   BUN 77*   CREATININE 1.0   CALCIUM 9.8   MG 1.9     CBC:   Recent Labs   Lab 11/03/19  0445 11/04/19  0408   WBC 10.64 11.03   HGB 13.3* 14.0   HCT 40.0 43.1    186     Respiratory Culture:   All pertinent labs within the past 24 hours have been reviewed.    Significant Imaging: I have reviewed all pertinent imaging results/findings within the past 24 hours.

## 2019-11-05 NOTE — ASSESSMENT & PLAN NOTE
10/29:  Not on rate control medical medications at home  HR ok for now  On coumadin   Pharm to dose   Metoprolol IV PRN     10/30:  Rate controlled     10/31:  Controlled     11/1:  Rate controlled, coumadin managed by pharm     Remains in Afib, rate controlled    Rate controlled

## 2019-11-06 LAB
ANION GAP SERPL CALC-SCNC: 10 MMOL/L (ref 8–16)
BASOPHILS # BLD AUTO: 0.04 K/UL (ref 0–0.2)
BASOPHILS NFR BLD: 0.3 % (ref 0–1.9)
BUN SERPL-MCNC: 55 MG/DL (ref 8–23)
CALCIUM SERPL-MCNC: 9 MG/DL (ref 8.7–10.5)
CHLORIDE SERPL-SCNC: 110 MMOL/L (ref 95–110)
CO2 SERPL-SCNC: 30 MMOL/L (ref 23–29)
CREAT SERPL-MCNC: 0.9 MG/DL (ref 0.5–1.4)
DIFFERENTIAL METHOD: ABNORMAL
EOSINOPHIL # BLD AUTO: 0.3 K/UL (ref 0–0.5)
EOSINOPHIL NFR BLD: 2.9 % (ref 0–8)
ERYTHROCYTE [DISTWIDTH] IN BLOOD BY AUTOMATED COUNT: 14.4 % (ref 11.5–14.5)
EST. GFR  (AFRICAN AMERICAN): >60 ML/MIN/1.73 M^2
EST. GFR  (NON AFRICAN AMERICAN): >60 ML/MIN/1.73 M^2
GLUCOSE SERPL-MCNC: 88 MG/DL (ref 70–110)
HCT VFR BLD AUTO: 44.1 % (ref 40–54)
HGB BLD-MCNC: 13.9 G/DL (ref 14–18)
IMM GRANULOCYTES # BLD AUTO: 0.23 K/UL (ref 0–0.04)
IMM GRANULOCYTES NFR BLD AUTO: 2 % (ref 0–0.5)
INR PPP: 3 (ref 0.8–1.2)
LYMPHOCYTES # BLD AUTO: 0.9 K/UL (ref 1–4.8)
LYMPHOCYTES NFR BLD: 7.5 % (ref 18–48)
MAGNESIUM SERPL-MCNC: 2.3 MG/DL (ref 1.6–2.6)
MCH RBC QN AUTO: 31.9 PG (ref 27–31)
MCHC RBC AUTO-ENTMCNC: 31.5 G/DL (ref 32–36)
MCV RBC AUTO: 101 FL (ref 82–98)
MONOCYTES # BLD AUTO: 0.9 K/UL (ref 0.3–1)
MONOCYTES NFR BLD: 7.7 % (ref 4–15)
NEUTROPHILS # BLD AUTO: 9.2 K/UL (ref 1.8–7.7)
NEUTROPHILS NFR BLD: 79.6 % (ref 38–73)
NRBC BLD-RTO: 0 /100 WBC
PLATELET # BLD AUTO: 178 K/UL (ref 150–350)
PMV BLD AUTO: 9.7 FL (ref 9.2–12.9)
POTASSIUM SERPL-SCNC: 3.9 MMOL/L (ref 3.5–5.1)
PROTHROMBIN TIME: 31.6 SEC (ref 9–12.5)
RBC # BLD AUTO: 4.36 M/UL (ref 4.6–6.2)
SODIUM SERPL-SCNC: 150 MMOL/L (ref 136–145)
WBC # BLD AUTO: 11.55 K/UL (ref 3.9–12.7)

## 2019-11-06 PROCEDURE — A4216 STERILE WATER/SALINE, 10 ML: HCPCS | Performed by: NURSE PRACTITIONER

## 2019-11-06 PROCEDURE — 99900035 HC TECH TIME PER 15 MIN (STAT)

## 2019-11-06 PROCEDURE — 25000242 PHARM REV CODE 250 ALT 637 W/ HCPCS: Performed by: NURSE PRACTITIONER

## 2019-11-06 PROCEDURE — 25000003 PHARM REV CODE 250: Performed by: EMERGENCY MEDICINE

## 2019-11-06 PROCEDURE — 25000003 PHARM REV CODE 250: Performed by: NURSE PRACTITIONER

## 2019-11-06 PROCEDURE — 83735 ASSAY OF MAGNESIUM: CPT

## 2019-11-06 PROCEDURE — 94640 AIRWAY INHALATION TREATMENT: CPT

## 2019-11-06 PROCEDURE — 25000003 PHARM REV CODE 250: Performed by: INTERNAL MEDICINE

## 2019-11-06 PROCEDURE — 97803 MED NUTRITION INDIV SUBSEQ: CPT

## 2019-11-06 PROCEDURE — 85025 COMPLETE CBC W/AUTO DIFF WBC: CPT

## 2019-11-06 PROCEDURE — 94668 MNPJ CHEST WALL SBSQ: CPT

## 2019-11-06 PROCEDURE — 99231 SBSQ HOSP IP/OBS SF/LOW 25: CPT | Mod: ,,, | Performed by: INTERNAL MEDICINE

## 2019-11-06 PROCEDURE — 94761 N-INVAS EAR/PLS OXIMETRY MLT: CPT

## 2019-11-06 PROCEDURE — 94799 UNLISTED PULMONARY SVC/PX: CPT

## 2019-11-06 PROCEDURE — 21400001 HC TELEMETRY ROOM

## 2019-11-06 PROCEDURE — 97530 THERAPEUTIC ACTIVITIES: CPT

## 2019-11-06 PROCEDURE — 99231 PR SUBSEQUENT HOSPITAL CARE,LEVL I: ICD-10-PCS | Mod: ,,, | Performed by: INTERNAL MEDICINE

## 2019-11-06 PROCEDURE — 92526 ORAL FUNCTION THERAPY: CPT

## 2019-11-06 PROCEDURE — 27000221 HC OXYGEN, UP TO 24 HOURS

## 2019-11-06 PROCEDURE — 85610 PROTHROMBIN TIME: CPT

## 2019-11-06 PROCEDURE — 97116 GAIT TRAINING THERAPY: CPT

## 2019-11-06 PROCEDURE — 80048 BASIC METABOLIC PNL TOTAL CA: CPT

## 2019-11-06 PROCEDURE — 94664 DEMO&/EVAL PT USE INHALER: CPT

## 2019-11-06 RX ORDER — ONDANSETRON 2 MG/ML
4 INJECTION INTRAMUSCULAR; INTRAVENOUS EVERY 8 HOURS PRN
Status: DISCONTINUED | OUTPATIENT
Start: 2019-11-06 | End: 2019-11-07 | Stop reason: HOSPADM

## 2019-11-06 RX ORDER — WARFARIN 1 MG/1
1 TABLET ORAL ONCE
Status: COMPLETED | OUTPATIENT
Start: 2019-11-06 | End: 2019-11-06

## 2019-11-06 RX ORDER — WARFARIN 2.5 MG/1
2.5 TABLET ORAL DAILY
Status: DISCONTINUED | OUTPATIENT
Start: 2019-11-07 | End: 2019-11-07

## 2019-11-06 RX ADMIN — BUDESONIDE 0.5 MG: 0.5 INHALANT RESPIRATORY (INHALATION) at 08:11

## 2019-11-06 RX ADMIN — CARBIDOPA AND LEVODOPA 1 TABLET: 25; 100 TABLET ORAL at 09:11

## 2019-11-06 RX ADMIN — Medication 10 ML: at 12:11

## 2019-11-06 RX ADMIN — CARVEDILOL 6.25 MG: 6.25 TABLET, FILM COATED ORAL at 09:11

## 2019-11-06 RX ADMIN — GUAIFENESIN 600 MG: 600 TABLET, EXTENDED RELEASE ORAL at 09:11

## 2019-11-06 RX ADMIN — CARBIDOPA AND LEVODOPA 1 TABLET: 25; 100 TABLET ORAL at 04:11

## 2019-11-06 RX ADMIN — BUDESONIDE 0.5 MG: 0.5 INHALANT RESPIRATORY (INHALATION) at 07:11

## 2019-11-06 RX ADMIN — POLYETHYLENE GLYCOL (3350) 17 G: 17 POWDER, FOR SOLUTION ORAL at 08:11

## 2019-11-06 RX ADMIN — GUAIFENESIN 600 MG: 600 TABLET, EXTENDED RELEASE ORAL at 08:11

## 2019-11-06 RX ADMIN — LISINOPRIL 20 MG: 20 TABLET ORAL at 08:11

## 2019-11-06 RX ADMIN — FAMOTIDINE 20 MG: 20 TABLET ORAL at 09:11

## 2019-11-06 RX ADMIN — IPRATROPIUM BROMIDE AND ALBUTEROL SULFATE 3 ML: .5; 3 SOLUTION RESPIRATORY (INHALATION) at 07:11

## 2019-11-06 RX ADMIN — ARFORMOTEROL TARTRATE 15 MCG: 15 SOLUTION RESPIRATORY (INHALATION) at 07:11

## 2019-11-06 RX ADMIN — AMOXICILLIN AND CLAVULANATE POTASSIUM 1 TABLET: 875; 125 TABLET, FILM COATED ORAL at 08:11

## 2019-11-06 RX ADMIN — AMOXICILLIN AND CLAVULANATE POTASSIUM 1 TABLET: 875; 125 TABLET, FILM COATED ORAL at 09:11

## 2019-11-06 RX ADMIN — Medication 10 ML: at 05:11

## 2019-11-06 RX ADMIN — Medication 10 ML: at 11:11

## 2019-11-06 RX ADMIN — WARFARIN SODIUM 1 MG: 1 TABLET ORAL at 05:11

## 2019-11-06 RX ADMIN — IPRATROPIUM BROMIDE AND ALBUTEROL SULFATE 3 ML: .5; 3 SOLUTION RESPIRATORY (INHALATION) at 08:11

## 2019-11-06 RX ADMIN — CARVEDILOL 6.25 MG: 6.25 TABLET, FILM COATED ORAL at 08:11

## 2019-11-06 RX ADMIN — CARBIDOPA AND LEVODOPA 1 TABLET: 25; 100 TABLET ORAL at 08:11

## 2019-11-06 RX ADMIN — MELATONIN 1000 UNITS: at 08:11

## 2019-11-06 RX ADMIN — ARFORMOTEROL TARTRATE 15 MCG: 15 SOLUTION RESPIRATORY (INHALATION) at 08:11

## 2019-11-06 RX ADMIN — IPRATROPIUM BROMIDE AND ALBUTEROL SULFATE 3 ML: .5; 3 SOLUTION RESPIRATORY (INHALATION) at 02:11

## 2019-11-06 RX ADMIN — FAMOTIDINE 20 MG: 20 TABLET ORAL at 08:11

## 2019-11-06 NOTE — PLAN OF CARE
Referral to Dheer[facility] sent via [facility].       11/06/19 0615   Post-Acute Status   Post-Acute Authorization Placement   Post-Acute Placement Status Referrals Sent

## 2019-11-06 NOTE — PROGRESS NOTES
Pharmacy Coumadin Consult Note    INR=3.0 (up from 2.2)  Hx of Afib  Goal INR=2-3    Current dose: warfarin 2.5mg daily  Due to jump in INR, will administer smaller dose than usual (Warfarin 1mg once)    Daily INR ordered  Patient has been educated  Pharmacy is consulted to dose.     Pharmacy will monitor daily INR levels and make dosage adjustments when necessary.  Thank you for allowing us to participate in this patient's care.  Destiny Dan, Pharm D 11/6/2019 9:33 AM

## 2019-11-06 NOTE — PROGRESS NOTES
Home Oxygen Evaluation    Date Performed: 2019    1) Patient's Home O2 Sat on room air, while at rest: 93 %         If O2 sats on room air at rest are 88% or below, patient qualifies. No additional testing needed. Document N/A in steps 2 and 3. If 89% or above, complete steps 2.      2) Patient's O2 Sat on room air while exercisin %        If O2 sats on room air while exercising remain 89% or above patient does not qualify, no further testing needed Document N/A in step 3. If O2 sats on room air while exercising are 88% or below, continue to step 3.      3) Patient's O2 Sat while exercising on O2: 93 % at 2 lpm   LPM         (Must show improvement from #2 for patients to qualify)    If O2 sats improve on oxygen, patient qualifies for portable oxygen. If not, the patient does not qualify.

## 2019-11-06 NOTE — ASSESSMENT & PLAN NOTE
-HR elevated in setting of respiratory distress/underlyling illness  -Continue Coreg for rate-control  -Up titrate if BP permits  -Continue Coumadin for CVA prophylaxis    11/3/19  -Remains in afib, rate-controlled  -Continue BB  -Continue Coumadin, dosing as per pharmacy    11/4/19  Stable, see above    11/5/19  -continue BB and coumadin     11/6/19  -continue current medical therapy

## 2019-11-06 NOTE — SUBJECTIVE & OBJECTIVE
Past Medical History:   Diagnosis Date    A-fib     COPD (chronic obstructive pulmonary disease)        History reviewed. No pertinent surgical history.    Review of patient's allergies indicates:  No Known Allergies    Medications:  Medications Prior to Admission   Medication Sig    albuterol (PROVENTIL) 2.5 mg /3 mL (0.083 %) nebulizer solution Take 2.5 mg by nebulization every 6 (six) hours as needed for Wheezing. Rescue    brimonidine 0.15 % OPTH DROP (ALPHAGAN) 0.15 % ophthalmic solution 1 drop 3 (three) times daily.    brinzolamide/brimonidine tart (SIMBRINZA OPHT) Apply to eye.    carbidopa-levodopa  mg (SINEMET)  mg per tablet     fluticasone propionate (FLOVENT HFA) 110 mcg/actuation inhaler Inhale 1 puff into the lungs 2 (two) times daily. Controller    furosemide (LASIX) 20 MG tablet     lisinopril (PRINIVIL,ZESTRIL) 20 MG tablet     vit C/vit E ac/lut/copper/zinc (PRESERVISION LUTEIN ORAL) Take by mouth.    vitamin D (VITAMIN D3) 1000 units Tab Take 1,000 Units by mouth once daily.    warfarin (COUMADIN) 5 MG tablet     bevacizumab (AVASTIN) 2.5 mg/0.10 mL injection Inject into the eye.     Antibiotics (From admission, onward)    Start     Stop Route Frequency Ordered    11/05/19 2100  amoxicillin-clavulanate 875-125mg per tablet 1 tablet      -- Oral Every 12 hours 11/05/19 1605        Antifungals (From admission, onward)    None        Antivirals (From admission, onward)    None             There is no immunization history on file for this patient.    Family History     None        Social History     Socioeconomic History    Marital status:      Spouse name: Not on file    Number of children: Not on file    Years of education: Not on file    Highest education level: Not on file   Occupational History    Not on file   Social Needs    Financial resource strain: Not on file    Food insecurity:     Worry: Not on file     Inability: Not on file    Transportation  needs:     Medical: Not on file     Non-medical: Not on file   Tobacco Use    Smoking status: Former Smoker   Substance and Sexual Activity    Alcohol use: Not on file    Drug use: Not on file    Sexual activity: Not on file   Lifestyle    Physical activity:     Days per week: Not on file     Minutes per session: Not on file    Stress: Not on file   Relationships    Social connections:     Talks on phone: Not on file     Gets together: Not on file     Attends [employer] witness service: Not on file     Active member of club or organization: Not on file     Attends meetings of clubs or organizations: Not on file     Relationship status: Not on file   Other Topics Concern    Not on file   Social History Narrative    Not on file     Review of Systems   Unable to perform ROS: Dementia     Objective:     Vital Signs (Most Recent):  Temp: 98.8 °F (37.1 °C) (11/06/19 0432)  Pulse: 83 (11/06/19 0432)  Resp: 18 (11/06/19 0432)  BP: (!) 140/87 (11/06/19 0432)  SpO2: 95 % (11/06/19 0432) Vital Signs (24h Range):  Temp:  [97.6 °F (36.4 °C)-98.9 °F (37.2 °C)] 98.8 °F (37.1 °C)  Pulse:  [66-88] 83  Resp:  [14-20] 18  SpO2:  [91 %-95 %] 95 %  BP: ()/(62-93) 140/87     Weight: 63.8 kg (140 lb 10.5 oz)  Body mass index is 17.58 kg/m².    Estimated Creatinine Clearance: 53.2 mL/min (based on SCr of 0.9 mg/dL).    Physical Exam   Constitutional: He does not have a sickly appearance. No distress. Nasal cannula in place.   Frail elderly man looks better,    HENT:   Head: Normocephalic and atraumatic.   edentulous   Eyes: Pupils are equal, round, and reactive to light. Conjunctivae and EOM are normal.   Neck: Normal range of motion. Neck supple.   Cardiovascular: Exam reveals no gallop and no friction rub.   No murmur heard.  Irregular irregular   Pulmonary/Chest: No stridor. No respiratory distress. He has no wheezes. He has rales.   Abdominal: Soft. Bowel sounds are normal. He exhibits no distension and no mass. There is no  tenderness. There is no guarding.   Genitourinary:   Genitourinary Comments: deferred   Musculoskeletal: He exhibits no edema.   Neurological: He is alert.   Skin: Skin is warm. No rash noted. He is not diaphoretic.   Psychiatric: He has a normal mood and affect. His behavior is normal.   Nursing note and vitals reviewed.      Significant Labs:   Blood Culture:   Recent Labs   Lab 10/29/19  0814 10/29/19  0828   LABBLOO No growth after 5 days. No growth after 5 days.     BMP:   Recent Labs   Lab 11/06/19  0507   GLU 88   *   K 3.9      CO2 30*   BUN 55*   CREATININE 0.9   CALCIUM 9.0   MG 2.3     CBC:   Recent Labs   Lab 11/05/19  0432 11/06/19  0507   WBC 10.18 11.55   HGB 15.5 13.9*   HCT 43.1 44.1    178     All pertinent labs within the past 24 hours have been reviewed.    Significant Imaging: I have reviewed all pertinent imaging results/findings within the past 24 hours.

## 2019-11-06 NOTE — HPI
86 year old  male with history of Parkinsons, atrial fibrillation , GERD, COPD who was transferred from an outside facility for acute respiratory related to aspiration.Patient was intubated prior to arrival he was initially managed in the ICU and is now on the medical floor.  Imaging test -10/30-Again seen is the extensive interstitial opacities throughout the right lung and to a lesser degree left lung.    All cultures are negative till date.

## 2019-11-06 NOTE — PLAN OF CARE
Patient 's spo2 is stable on nasal cannula 2 lpm, still has a lot of congestion, doing well aerobika and an incentive spirometry..

## 2019-11-06 NOTE — PLAN OF CARE
Pt remained free from falls, fall precautions in place, pt is on tele monitor see shift rounds. No other c/o at this time, PICC line wnl, Call bell and personal belongings within reach. Hourly rounding completed. Instructed pt to call for assistance as needed. Will continue to monitor.

## 2019-11-06 NOTE — CONSULTS
[facility]  Infectious Disease  Consult Note    Patient Name: Shaq Huffman  MRN: [id number]  Admission Date: 10/29/2019  Hospital Length of Stay: 8 days  Attending Physician: Kalen Allan MD  Primary Care Provider: Provider Notinsystem     Isolation Status: No active isolations    Patient information was obtained from patient, spouse/SO, past medical records and ER records.      Consults  Assessment/Plan:     * Aspiration pneumonia of right lower lobe due to gastric secretions  Will switch to PO Augmentin at this time and will plan to complete 7 more days of therapy .  Will need speech therapy, aspiration precautions.    New onset of congestive heart failure  Diuresis as per primary team    Parkinson disease  Will continue Sinemet    Acute respiratory failure with hypoxia  Now extubated, related to aspiration pneumonia , will need aspiration precaution    Atrial fibrillation with RVR  Rate control as per primary team,on coreg, coumadin        Thank you for your consult. I will follow-up with patient. Please contact us if you have any additional questions.    Raoul Guthrie MD  Infectious Disease  [facility]    Subjective:     Principal Problem: Aspiration pneumonia of right lower lobe due to gastric secretions    HPI: 86 year old  male with history of Parkinsons, atrial fibrillation , GERD, COPD who was transferred from an outside facility for acute respiratory related to aspiration.Patient was intubated prior to arrival he was initially managed in the ICU and is now on the medical floor.  Imaging test -10/30-Again seen is the extensive interstitial opacities throughout the right lung and to a lesser degree left lung.    All cultures are negative till date.    Past Medical History:   Diagnosis Date    A-fib     COPD (chronic obstructive pulmonary disease)        History reviewed. No pertinent surgical history.    Review of patient's allergies indicates:  No Known  Allergies    Medications:  Medications Prior to Admission   Medication Sig    albuterol (PROVENTIL) 2.5 mg /3 mL (0.083 %) nebulizer solution Take 2.5 mg by nebulization every 6 (six) hours as needed for Wheezing. Rescue    brimonidine 0.15 % OPTH DROP (ALPHAGAN) 0.15 % ophthalmic solution 1 drop 3 (three) times daily.    brinzolamide/brimonidine tart (SIMBRINZA OPHT) Apply to eye.    carbidopa-levodopa  mg (SINEMET)  mg per tablet     fluticasone propionate (FLOVENT HFA) 110 mcg/actuation inhaler Inhale 1 puff into the lungs 2 (two) times daily. Controller    furosemide (LASIX) 20 MG tablet     lisinopril (PRINIVIL,ZESTRIL) 20 MG tablet     vit C/vit E ac/lut/copper/zinc (PRESERVISION LUTEIN ORAL) Take by mouth.    vitamin D (VITAMIN D3) 1000 units Tab Take 1,000 Units by mouth once daily.    warfarin (COUMADIN) 5 MG tablet     bevacizumab (AVASTIN) 2.5 mg/0.10 mL injection Inject into the eye.     Antibiotics (From admission, onward)    Start     Stop Route Frequency Ordered    11/05/19 2100  amoxicillin-clavulanate 875-125mg per tablet 1 tablet      -- Oral Every 12 hours 11/05/19 1605        Antifungals (From admission, onward)    None        Antivirals (From admission, onward)    None             There is no immunization history on file for this patient.    Family History     None        Social History     Socioeconomic History    Marital status:      Spouse name: Not on file    Number of children: Not on file    Years of education: Not on file    Highest education level: Not on file   Occupational History    Not on file   Social Needs    Financial resource strain: Not on file    Food insecurity:     Worry: Not on file     Inability: Not on file    Transportation needs:     Medical: Not on file     Non-medical: Not on file   Tobacco Use    Smoking status: Former Smoker   Substance and Sexual Activity    Alcohol use: Not on file    Drug use: Not on file    Sexual  activity: Not on file   Lifestyle    Physical activity:     Days per week: Not on file     Minutes per session: Not on file    Stress: Not on file   Relationships    Social connections:     Talks on phone: Not on file     Gets together: Not on file     Attends Taoism service: Not on file     Active member of club or organization: Not on file     Attends meetings of clubs or organizations: Not on file     Relationship status: Not on file   Other Topics Concern    Not on file   Social History Narrative    Not on file     Review of Systems   Unable to perform ROS: Dementia     Objective:     Vital Signs (Most Recent):  Temp: 98.8 °F (37.1 °C) (11/06/19 0432)  Pulse: 83 (11/06/19 0432)  Resp: 18 (11/06/19 0432)  BP: (!) 140/87 (11/06/19 0432)  SpO2: 95 % (11/06/19 0432) Vital Signs (24h Range):  Temp:  [97.6 °F (36.4 °C)-98.9 °F (37.2 °C)] 98.8 °F (37.1 °C)  Pulse:  [66-88] 83  Resp:  [14-20] 18  SpO2:  [91 %-95 %] 95 %  BP: ()/(62-93) 140/87     Weight: 63.8 kg (140 lb 10.5 oz)  Body mass index is 17.58 kg/m².    Estimated Creatinine Clearance: 53.2 mL/min (based on SCr of 0.9 mg/dL).    Physical Exam   Constitutional: He does not have a sickly appearance. No distress. Nasal cannula in place.   Frail elderly man looks better,    HENT:   Head: Normocephalic and atraumatic.   edentulous   Eyes: Pupils are equal, round, and reactive to light. Conjunctivae and EOM are normal.   Neck: Normal range of motion. Neck supple.   Cardiovascular: Exam reveals no gallop and no friction rub.   No murmur heard.  Irregular irregular   Pulmonary/Chest: No stridor. No respiratory distress. He has no wheezes. He has rales.   Abdominal: Soft. Bowel sounds are normal. He exhibits no distension and no mass. There is no tenderness. There is no guarding.   Genitourinary:   Genitourinary Comments: deferred   Musculoskeletal: He exhibits no edema.   Neurological: He is alert.   Skin: Skin is warm. No rash noted. He is not  diaphoretic.   Psychiatric: He has a normal mood and affect. His behavior is normal.   Nursing note and vitals reviewed.      Significant Labs:   Blood Culture:   Recent Labs   Lab 10/29/19  0814 10/29/19  0828   LABBLOO No growth after 5 days. No growth after 5 days.     BMP:   Recent Labs   Lab 11/06/19  0507   GLU 88   *   K 3.9      CO2 30*   BUN 55*   CREATININE 0.9   CALCIUM 9.0   MG 2.3     CBC:   Recent Labs   Lab 11/05/19  0432 11/06/19  0507   WBC 10.18 11.55   HGB 15.5 13.9*   HCT 43.1 44.1    178     All pertinent labs within the past 24 hours have been reviewed.    Significant Imaging: I have reviewed all pertinent imaging results/findings within the past 24 hours.

## 2019-11-06 NOTE — PLAN OF CARE
Pt did not ambulated as far today due to some new Orthostatic blood pressure . MIN A with all mobility.

## 2019-11-06 NOTE — PLAN OF CARE
Pt took sips of nectar thick liquids and puree with pill buried inside with weak following some sips of nectar thick liquids. Recommend MBSS to further assess swallow safety.

## 2019-11-06 NOTE — PT/OT/SLP PROGRESS
Speech Language Pathology Treatment    Patient Name:  Shaq Huffman   MRN:  [id number]  Admitting Diagnosis: Aspiration pneumonia of right lower lobe due to gastric secretions    Recommendations:                 General Recommendations:  Dysphagia therapy and Modified barium swallow study  Diet recommendations:  Puree, Liquid Diet Level: Nectar Thick   Aspiration Precautions: 1 bite/sip at a time, Alternating bites/sips, Avoid talking while eating, HOB to 90 degrees and Small bites/sips   General Precautions: Standard, aspiration, fall  Communication strategies:  none    Subjective     Pt cooperative and pleasant  Patient goals: to eat whole foods    Pain/Comfort:  · Pain Rating 1: 0/10  · Pain Rating Post-Intervention 1: 0/10    Objective:     Has the patient been evaluated by SLP for swallowing?   Yes  Keep patient NPO? No   Current Respiratory Status: nasal cannula      Pt presents with weak cough at rest intermittently. He  Tolerated sips of nectar via cup rim and puree with pills buried with some coughing after trials of nectar indicating possible aspiration. Recommend MBSS to further assess safety of po diet.    Assessment:     Shaq Huffman is a 86 y.o. male with an SLP diagnosis of Dysphagia and Dysarthria.  He presents with s/s of aspiration with nectar thick liquids..    Goals:   Multidisciplinary Problems     SLP Goals        Problem: SLP Goal    Goal Priority Disciplines Outcome   SLP Goal     SLP Ongoing, Progressing   Description:  1. Pt will complete oral and pharyngeal exercises with min A for increased strength and ROM  2. Pt will tolerate least restrictive diet without incidence and while maintaining airway integrity                     Plan:     · Patient to be seen:  3 x/week   · Plan of Care expires:  11/08/19  · Plan of Care reviewed with:  patient, family   · SLP Follow-Up:  Yes       Discharge recommendations:  nursing facility, skilled   Barriers to Discharge:  None    Time Tracking:      SLP Treatment Date:   11/06/19  Speech Start Time:  1012  Speech Stop Time:  1044     Speech Total Time (min):  32 min    Billable Minutes: Treatment Swallowing Dysfunction 32    Danielle Willis CCC-SLP  11/06/2019

## 2019-11-06 NOTE — PROGRESS NOTES
"  [facility]   Adult Nutrition  Progress Note    SUMMARY     Recommendations    Recommendation: 1.Continue current diet. 2. Add boost plus BID. 3. RD to f/u.   Goals: Meet > 85 % EEN/EPN by RD f/u   Nutrition Goal Status: progressing towards goal   Communication of RD Recs: (POC, sticky note)    Reason for Assessment    Reason For Assessment: CRAIG f/u   Diagnosis: (Acute respiratory failure)  Relevant Medical History:  AF, COPD  General Information Comments:   11/1/19. RD consulted x TF recs. Pt admitted x respiratory failure. Pt currently in ICU, intubated and sedated w/ propofol. Pt's wife reports no wt loss in the last year. Per pt's wife, pt w/ good appetite and intake PTA. NFPE not performed d/t pt w/ no signs of malnutrition. Reviewed TF recs w/ NP this am.   11/1/19. Now extubated. Pt on NC.  Tolerating TF @ 40 ml/hr . Very drowsy. Per ICU rounds, plan to continue TF today until ST eval. Reviewed nutrition recs w/ NP this am.   11/6/19. Pt currently on dysphagia pureed diet w/ nectar thick liquids per ST recs. Pt tolerating diet w/ fair intake.   Nutrition Discharge Planning: dysphagia pureed diet     Nutrition Risk Screen    Nutrition Risk Screen: dysphagia or difficulty swallowing    Nutrition/Diet History    Spiritual, Cultural Beliefs, Zoroastrianism Practices, Values that Affect Care: no     Anthropometrics    Temp: 97.5 °F (36.4 °C)  Height Method: Stated  Height: 6' 3" (190.5 cm)  Height (inches): 75 in  Weight Method: Bed Scale  Weight: 63.8 kg (140 lb 10.5 oz)  Weight (lb): 140.65 lb  Ideal Body Weight (IBW), Male: 196 lb  % Ideal Body Weight, Male (lb): 87.62 lb  BMI (Calculated): 21.5  BMI Grade: 18.5-24.9 - normal       Lab/Procedures/Meds    Pertinent Labs Reviewed: reviewed  BMP  Lab Results   Component Value Date     (H) 11/06/2019    K 3.9 11/06/2019     11/06/2019    CO2 30 (H) 11/06/2019    BUN 55 (H) 11/06/2019    CREATININE 0.9 11/06/2019    CALCIUM 9.0 11/06/2019    " ANIONGAP 10 11/06/2019    ESTGFRAFRICA >60 11/06/2019    EGFRNONAA >60 11/06/2019     Lab Results   Component Value Date    CALCIUM 9.0 11/06/2019     Lab Results   Component Value Date    ALBUMIN 4.1 10/29/2019     No results for input(s): POCTGLUCOSE in the last 24 hours.    Pertinent Medications Reviewed: reviewed      Estimated/Assessed Needs    Weight Used For Calorie Calculations: 77.9 kg (171 lb 11.8 oz)  Energy Calorie Requirements (kcal): 1852 - 2007  Energy Need Method: [place]. Jeor x 1.2- 1.3   Protein Requirements: 93 g  Weight Used For Protein Calculations: 77.9 kg (171 lb 11.8 oz)     Estimated Fluid Requirement Method: RDA Method(or per MD)  RDA Method (mL): 1852         Nutrition Prescription Ordered    Current Diet Order: dysphagia pureed diet w/ nectar thick liquids      Evaluation of Received Nutrient/Fluid Intake        Intake/Output Summary (Last 24 hours) at 11/6/2019 1142  Last data filed at 11/6/2019 0600  Gross per 24 hour   Intake 707 ml   Output 600 ml   Net 107 ml     % Intake of Estimated Energy Needs: 50 - 75 %  % Meal Intake: 50 - 75 %    Nutrition Risk      1xweekly    Assessment and Plan    Nutrition Problem  Inadequate energy intake     Related to (etiology):   NPO status, no alternative means of nutrition     Signs and Symptoms (as evidenced by):   Meeting < 85 % EEN/EPN    Interventions:  Collaboration with other providers     Nutrition Diagnosis Status:   Resolved        Monitor and Evaluation    Food and Nutrient Intake: energy intake  Food and Nutrient Adminstration:diet order  Anthropometric Measurements: weight  Biochemical Data, Medical Tests and Procedures: electrolyte and renal panel, glucose/endocrine profile  Nutrition-Focused Physical Findings: overall appearance       Nutrition Follow-Up    RD Follow-up?: Yes

## 2019-11-06 NOTE — PROGRESS NOTES
[facility]  Cardiology  Progress Note    Patient Name: Shaq Huffman  MRN: [id number]  Admission Date: 10/29/2019  Hospital Length of Stay: 8 days  Code Status: DNR   Attending Physician: Kalen Allan MD   Primary Care Physician: Provider Notinsystem  Expected Discharge Date:   Principal Problem:Aspiration pneumonia of right lower lobe due to gastric secretions    Subjective:   Brief HPI:  Mr. Huffman is an 86 year old male patient whose current medical conditions include Parkinson's disease, chronic afib (on Coumadin), GERD, and COPD who was transferred to [facility] from outside facility due to respiratory failure. Patient was on [place] a [place] [employer] [place] when he began to complain of progressively worsening SOB. Associated symptoms included coughing and lethargy. EMT was called and subsequently sent patient to ED. His respiratory status continued to decline and he was subsequently intubated. Initial workup in ED revealed troponin of 0.029>0.159, BNP of 353, lactic acidosis (2.7), and positive procalcitonin. CXR showed RLL infiltrate concerning for aspiration and coarse interstitial markings. EKG showed afib with RVR with HR in 150's and patient was subsequently admitted for further evaluation and treatment. Cardiology consulted to assist with management. Patient seen and examined today, sitting up in bed. Still complains of SOB. Tachypneic and in mild respiratory distress at time of exam. Remains chest pain free. He reports compliance with his medications. Chart reviewed. Troponin trending down. 2D echo showed EF of 25-40%, DD, and RVE with moderately depressed systolic function, mild AS.  INR 2.6.        Hospital Course:   11/3/19-Patient seen and examined today. Feeling better, less SOB. More responsive. No chest pain. Good diuresis overnight. Labs reviewed. Creatinine stable, BUN, 66. K-3.3. INR 2.2.     11/4/19-Patient seen and examined today. Improving. SOB better. Worked with PT/OT  this AM. No chest pain.  Labs reviewed. Creatinine 1.0, BUN 77, Na 148, K 3.1. INR 2.2.    11/5/19-patient OOB in chair this morning. Ambulated some with PT today. NGT has been DC'd. Still having some SOB this morning. Has no chest pain.     11/6/19- Uneventful night. Working with ST this morning. No angina or equivalent         Review of Systems   Constitution: Positive for malaise/fatigue.   HENT: Negative.    Eyes: Negative.    Cardiovascular: Positive for dyspnea on exertion.   Respiratory: Positive for cough and shortness of breath.    Endocrine: Negative.    Hematologic/Lymphatic: Bruises/bleeds easily.   Skin: Negative.    Musculoskeletal: Negative.    Gastrointestinal: Negative.    Genitourinary: Negative.    Neurological: Negative.    Psychiatric/Behavioral: Negative.    Allergic/Immunologic: Negative.      Objective:     Vital Signs (Most Recent):  Temp: 97 °F (36.1 °C) (11/06/19 1207)  Pulse: 87 (11/06/19 1207)  Resp: (!) 24 (11/06/19 1207)  BP: (!) 72/47 (11/06/19 1207)  SpO2: (!) 92 % (11/06/19 1207) Vital Signs (24h Range):  Temp:  [97 °F (36.1 °C)-98.9 °F (37.2 °C)] 97 °F (36.1 °C)  Pulse:  [66-90] 87  Resp:  [18-24] 24  SpO2:  [87 %-97 %] 92 %  BP: ()/(47-93) 72/47     Weight: 63.8 kg (140 lb 10.5 oz)  Body mass index is 17.58 kg/m².     SpO2: (!) 92 %  O2 Device (Oxygen Therapy): room air(while exercise)      Intake/Output Summary (Last 24 hours) at 11/6/2019 1207  Last data filed at 11/6/2019 0600  Gross per 24 hour   Intake 707 ml   Output 600 ml   Net 107 ml       Lines/Drains/Airways     Peripherally Inserted Central Catheter Line                 PICC Double Lumen 10/30/19 0445 left brachial 7 days          Drain                 Urethral Catheter 10/29/19 1440 16 Fr. 7 days                Physical Exam   Constitutional: He is oriented to person, place, and time. He appears well-developed. No distress.   Cachectic, frail appearing   HENT:   Head: Normocephalic and atraumatic.   Eyes: Pupils  are equal, round, and reactive to light. Right eye exhibits no discharge. Left eye exhibits no discharge.   Neck: Neck supple. No JVD present.   Cardiovascular: Normal rate, S1 normal, S2 normal and normal heart sounds. An irregularly irregular rhythm present. Exam reveals no S4.   No murmur heard.  Pulmonary/Chest: Effort normal. No respiratory distress. He has no wheezes.   Rhonchi   Abdominal: Soft. He exhibits no distension.   Musculoskeletal: He exhibits no edema.   Neurological: He is alert and oriented to person, place, and time.   Skin: Skin is warm and dry. He is not diaphoretic. No erythema.   Psychiatric: He has a normal mood and affect. His behavior is normal. Thought content normal.   Nursing note and vitals reviewed.      Significant Labs:   All pertinent lab results from the last 24 hours have been reviewed. and   Recent Lab Results       11/06/19  0507        Anion Gap 10     Baso # 0.04     Basophil% 0.3     BUN, Bld 55     Calcium 9.0     Chloride 110     CO2 30     Creatinine 0.9     Differential Method Automated     eGFR if  >60     eGFR if non  >60  Comment:  Calculation used to obtain the estimated glomerular filtration  rate (eGFR) is the CKD-EPI equation.        Eos # 0.3     Eosinophil% 2.9     Glucose 88     Gran # (ANC) 9.2     Gran% 79.6     Hematocrit 44.1     Hemoglobin 13.9     Immature Grans (Abs) 0.23  Comment:  Mild elevation in immature granulocytes is non specific and   can be seen in a variety of conditions including stress response,   acute inflammation, trauma and pregnancy. Correlation with other   laboratory and clinical findings is essential.       Immature Granulocytes 2.0     Coumadin Monitoring INR 3.0  Comment:  Coumadin Therapy:  2.0 - 3.0 for INR for all indicators except mechanical heart valves  and antiphospholipid syndromes which should use 2.5 - 3.5.       Lymph # 0.9     Lymph% 7.5     Magnesium 2.3     MCH 31.9     MCHC 31.5           Mono # 0.9     Mono% 7.7     MPV 9.7     nRBC 0     Platelets 178     Potassium 3.9     Protime 31.6     RBC 4.36     RDW 14.4     Sodium 150     WBC 11.55           Significant Imaging: Echocardiogram: 2D echo with color flow doppler: No results found for this or any previous visit.    Assessment and Plan:       * Aspiration pneumonia of right lower lobe due to gastric secretions  -Mgmt as per hospital medicine  -On abx    New onset of congestive heart failure  -2D echo showed EF of 35-40%, DD, RVE with moderately depressed systolic function  -Diurese with IV Lasix 40 mg BID  -Continue BB, ACEi  -Will need ischemic evaluation once respiratory status improves    11/3/19  -Continue IV diuresis for additional day  -Continue BB, ACEi    11/4/19  -Lasix d/c'd due to rising BUN  -Continue BB, ACEi  -Optimize regimen further as clinical condition permits    11/5/19  -Continue metoprolol and ACEI  -Lasix on hold due to bump in BUN  -Recommend OP stress test with primary Cardiologist in [place]   -Will be be available PRN     11/6/19  -Continue current medical management as mentioned above.   -OP stress test with Cardiologist in [place].   -Will be available PRN     Parkinson disease  -Mgmt as per hospital medicine    Panlobular emphysema  -Mgmt as per hospital medicine  -Improving    Atrial fibrillation with RVR  -HR elevated in setting of respiratory distress/underlyling illness  -Continue Coreg for rate-control  -Up titrate if BP permits  -Continue Coumadin for CVA prophylaxis    11/3/19  -Remains in afib, rate-controlled  -Continue BB  -Continue Coumadin, dosing as per pharmacy    11/4/19  Stable, see above    11/5/19  -continue BB and coumadin     11/6/19  -continue current medical therapy           VTE Risk Mitigation (From admission, onward)         Ordered     warfarin (COUMADIN) tablet 2.5 mg  Daily      11/06/19 0935     warfarin (COUMADIN) tablet 1 mg  Once      11/06/19 0934     Place sequential  compression device  Until discontinued      10/29/19 1630     IP VTE HIGH RISK PATIENT  Once      10/29/19 1630              Chart reviewed. Patient examined by Dr. Correa and agrees with plan that has been outlined.     DARSHANA Casiano  Cardiology  [facility]

## 2019-11-06 NOTE — PLAN OF CARE
Pt c/o of congestion.   Interventions performed: CPT, medications adjusted, frequent checks of skin.  Pt tolerated CPT and medication changes.  Mobility status: Moderate assist.   Patient AFIB on monitor.   Safety maintained per pt.  Pt aware of POC.  Will continue to monitor.

## 2019-11-06 NOTE — PT/OT/SLP PROGRESS
"Occupational Therapy  Treatment    Shaq Huffman   MRN: [id number]   Admitting Diagnosis: Aspiration pneumonia of right lower lobe due to gastric secretions    OT Date of Treatment: 11/06/19   OT Start Time: 1125  OT Stop Time: 1150  OT Total Time (min): 25 min    Billable Minutes:  Therapeutic Activity 25 MINUTES    General Precautions: Standard, fall, aspiration  Orthopedic Precautions: N/A  Braces:           Subjective:  Communicated with NURSE AND Epic CHART REVIEW prior to session.         Objective:  Patient found with: bed alarm, telemetry, zuluaga catheter, oxygen     Functional Mobility:  Bed Mobility:  MIN A     Transfers:   STEP PIVOT BED>BEDSIDE CHAIR WITH MIN A    Functional Ambulation: NA    Activities of Daily Living:     Feeding adaptive equipment: NA     UE adaptive equipment: NA     LE adaptive equipment: NA     Bathing adaptive equipment: NA    Balance:   Static Sit: FAIR+: Able to take MINIMAL challenges from all directions  Dynamic Sit: FAIR: Cannot move trunk without losing balance  Static Stand: POOR+: Needs MINIMAL assist to maintain  Dynamic stand: POOR: Needs MOD (moderate) assist during gait    Therapeutic Activities and Exercises:  PT REPORTED FEELING NAUSEATED AND NEEDED TO SIT . NURSE KADI NOTIFIED AND SON PRESENT IN ROOM.     AM-PAC 6 CLICK ADL   How much help from another person does this patient currently need?   1 = Unable, Total/Dependent Assistance  2 = A lot, Maximum/Moderate Assistance  3 = A little, Minimum/Contact Guard/Supervision  4 = None, Modified Tynan/Independent    Putting on and taking off regular lower body clothing? : 2  Bathing (including washing, rinsing, drying)?: 2  Toileting, which includes using toilet, bedpan, or urinal? : 1(ZULUAGA PLACEMENT)  Putting on and taking off regular upper body clothing?: 2  Taking care of personal grooming such as brushing teeth?: 2  Eating meals?: 2  Daily Activity Total Score: 11     AM-PAC Raw Score CMS "G-Code Modifier " Level of Impairment Assistance   6 % Total / Unable   7 - 8 CM 80 - 100% Maximal Assist   9-13 CL 60 - 80% Moderate Assist   14 - 19 CK 40 - 60% Moderate Assist   20 - 22 CJ 20 - 40% Minimal Assist   23 CI 1-20% SBA / CGA   24 CH 0% Independent/ Mod I       Patient left up in chair with all lines intact, call button in reach, chair alarm on, NURSE KADI notified and SON\ present    ASSESSMENT:  Shaq Huffman is a 86 y.o. male with a medical diagnosis of Aspiration pneumonia of right lower lobe due to gastric secretions and presents with DEBILITY AND GENERALIZED WEAKNESS. PT WILL CONTINUE TO BENEFIR FROM SKILLED OT    Rehab identified problem list/impairments: Rehab identified problem list/impairments: weakness, impaired functional mobilty, impaired balance, decreased upper extremity function, impaired endurance, impaired self care skills    Rehab potential is good.    Activity tolerance: Good    Discharge recommendations: Discharge Facility/Level of Care Needs: rehabilitation facility     Barriers to discharge: Barriers to Discharge: None    Equipment recommendations: walker, rolling, bedside commode(TO BE DETERMINE)     GOALS:   Multidisciplinary Problems     Occupational Therapy Goals        Problem: Occupational Therapy Goal    Goal Priority Disciplines Outcome Interventions   Occupational Therapy Goal     OT, PT/OT Ongoing, Progressing    Description:  LTGs to be met by 11/9/19  1. Pt will perform BSC t/f with Mod A  2. Pt will perform LE dressing with Mod A  3. Pt will perform UE dressing with Min A  4. Pt will perform (B) UE ROM exercises 1 x 20 reps                    Plan:  Patient to be seen 3 x/week to address the above listed problems via self-care/home management, therapeutic activities, therapeutic exercises  Plan of Care expires:    Plan of Care reviewed with: patient, family         Aylin Vital OT  11/06/2019

## 2019-11-06 NOTE — ASSESSMENT & PLAN NOTE
-2D echo showed EF of 35-40%, DD, RVE with moderately depressed systolic function  -Diurese with IV Lasix 40 mg BID  -Continue BB, ACEi  -Will need ischemic evaluation once respiratory status improves    11/3/19  -Continue IV diuresis for additional day  -Continue BB, ACEi    11/4/19  -Lasix d/c'd due to rising BUN  -Continue BB, ACEi  -Optimize regimen further as clinical condition permits    11/5/19  -Continue metoprolol and ACEI  -Lasix on hold due to bump in BUN  -Recommend OP stress test with primary Cardiologist in [place]   -Will be be available PRN     11/6/19  -Continue current medical management as mentioned above.   -OP stress test with Cardiologist in [place].   -Will be available PRN

## 2019-11-06 NOTE — CONSULTS
Discussed discharge planning with patient and girlfriend.Alina Perez.  Dr. Allan is suggesting going to rehab for several days before returning to [place]. Girlfriend would like patient to go to rehab in [place].  They have trip insurance which pays his way home.  Spoke to Tiarra at [employer].  She states patient must be medically stable and able to travel home on a commercial airline.  [facility].

## 2019-11-06 NOTE — PT/OT/SLP PROGRESS
Physical Therapy  Treatment    Shaq Huffman   MRN: [id number]   Admitting Diagnosis: Aspiration pneumonia of right lower lobe due to gastric secretions    PT Received On: 11/06/19  PT Start Time: 1510     PT Stop Time: 1540    PT Total Time (min): 30 min       Billable Minutes:  Gait Training 15 and Therapeutic Activity 15    Treatment Type: Treatment  PT/PTA: PTA     PTA Visit Number: 1       General Precautions: Standard, fall, aspiration  Orthopedic Precautions: N/A   Braces: N/A         Subjective:  Communicated with epic prior to session.  Pt's son in room - Bill and pts Girlfriend. They report that he sat in the chair after OT earlir and got dizzy, had to get in bed, BP was very low.     Pt agreed to tx. No complaints.     Pain/Comfort  Pain Rating 1: 0/10    Objective:   Patient found with: bed alarm, telemetry, oxygen, zuluaga catheter    Functional Mobility:  Bed Mobility:     Supine to sit: MIN A   Sit to Supine: MIN A    Transfers:    Sit to stand: MIN A   Stand to sit: CGA    Gait:     5 feet ( 2.5 feet fwd then bwd) with a RW and MIN A.  SOB noted. Distance limited for safety as pt had issued with BP .         Therapeutic Activities and Exercises:  Pt sat eob x 5 min. Worked on active Ap, TKE and HF exercises x 10 reps to get blood flowing. Stood with the RW and after ~10 sec he complained of dizziness. Sat and rested then DR Allan and the  came to the room. Discussed the orthostatic Hypotension. Pt stood again x 1 min , no dizziness so he then took some steps away from the bed. No major issues x sob. Sat eob and SHYANN Lund came to the room.      AM-PAC 6 CLICK MOBILITY  How much help from another person does this patient currently need?   1 = Unable, Total/Dependent Assistance  2 = A lot, Maximum/Moderate Assistance  3 = A little, Minimum/Contact Guard/Supervision  4 = None, Modified Jenkins/Independent    Turning over in bed (including adjusting bedclothes, sheets and blankets)?:  3  Sitting down on and standing up from a chair with arms (e.g., wheelchair, bedside commode, etc.): 3  Moving from lying on back to sitting on the side of the bed?: 3  Moving to and from a bed to a chair (including a wheelchair)?: 3  Need to walk in hospital room?: 3  Climbing 3-5 steps with a railing?: 1  Basic Mobility Total Score: 16    AM-PAC Raw Score CMS G-Code Modifier Level of Impairment Assistance   6 % Total / Unable   7 - 9 CM 80 - 100% Maximal Assist   10 - 14 CL 60 - 80% Moderate Assist   15 - 19 CK 40 - 60% Moderate Assist   20 - 22 CJ 20 - 40% Minimal Assist   23 CI 1-20% SBA / CGA   24 CH 0% Independent/ Mod I     Patient left HOB elevated with all lines intact, call button in reach and family and Dheeraj liason  in room.  present.    Assessment:  Shaq Huffman is a 86 y.o. male with a medical diagnosis of Aspiration pneumonia of right lower lobe due to gastric secretions and presents with improved mobility over all but new Hypotension issues.  Pt IS able to tolerated a 30 min therapy session now. Progressing.      Rehab identified problem list/impairments: Rehab identified problem list/impairments: weakness, impaired endurance, impaired self care skills, impaired functional mobilty, gait instability, impaired balance, decreased upper extremity function, decreased lower extremity function, decreased safety awareness, decreased ROM, impaired cardiopulmonary response to activity    Rehab potential is good.    Activity tolerance: Fair    Discharge recommendations: Discharge Facility/Level of Care Needs: rehabilitation facility     Barriers to discharge:      Equipment recommendations:       GOALS:   Multidisciplinary Problems     Physical Therapy Goals        Problem: Physical Therapy Goal    Goal Priority Disciplines Outcome Goal Variances Interventions   Physical Therapy Goal     PT, PT/OT Ongoing, Progressing     Description:  1. Patient will perform supine to/from sit mod A  2. Patient will  perform sit to/from stand with RW mod A  3. Patient will amb 25ft RW mod A                    PLAN:    Patient to be seen 5 x/week  to address the above listed problems via gait training, therapeutic activities, therapeutic exercises  Plan of Care expires: 11/09/19  Plan of Care reviewed with: patient, son(girlfriend)         Yenifer Smallwood, PTA  11/06/2019

## 2019-11-06 NOTE — SUBJECTIVE & OBJECTIVE
Review of Systems   Constitution: Positive for malaise/fatigue.   HENT: Negative.    Eyes: Negative.    Cardiovascular: Positive for dyspnea on exertion.   Respiratory: Positive for cough and shortness of breath.    Endocrine: Negative.    Hematologic/Lymphatic: Bruises/bleeds easily.   Skin: Negative.    Musculoskeletal: Negative.    Gastrointestinal: Negative.    Genitourinary: Negative.    Neurological: Negative.    Psychiatric/Behavioral: Negative.    Allergic/Immunologic: Negative.      Objective:     Vital Signs (Most Recent):  Temp: 97 °F (36.1 °C) (11/06/19 1207)  Pulse: 87 (11/06/19 1207)  Resp: (!) 24 (11/06/19 1207)  BP: (!) 72/47 (11/06/19 1207)  SpO2: (!) 92 % (11/06/19 1207) Vital Signs (24h Range):  Temp:  [97 °F (36.1 °C)-98.9 °F (37.2 °C)] 97 °F (36.1 °C)  Pulse:  [66-90] 87  Resp:  [18-24] 24  SpO2:  [87 %-97 %] 92 %  BP: ()/(47-93) 72/47     Weight: 63.8 kg (140 lb 10.5 oz)  Body mass index is 17.58 kg/m².     SpO2: (!) 92 %  O2 Device (Oxygen Therapy): room air(while exercise)      Intake/Output Summary (Last 24 hours) at 11/6/2019 1207  Last data filed at 11/6/2019 0600  Gross per 24 hour   Intake 707 ml   Output 600 ml   Net 107 ml       Lines/Drains/Airways     Peripherally Inserted Central Catheter Line                 PICC Double Lumen 10/30/19 0445 left brachial 7 days          Drain                 Urethral Catheter 10/29/19 1440 16 Fr. 7 days                Physical Exam   Constitutional: He is oriented to person, place, and time. He appears well-developed. No distress.   Cachectic, frail appearing   HENT:   Head: Normocephalic and atraumatic.   Eyes: Pupils are equal, round, and reactive to light. Right eye exhibits no discharge. Left eye exhibits no discharge.   Neck: Neck supple. No JVD present.   Cardiovascular: Normal rate, S1 normal, S2 normal and normal heart sounds. An irregularly irregular rhythm present. Exam reveals no S4.   No murmur heard.  Pulmonary/Chest: Effort  normal. No respiratory distress. He has no wheezes.   Rhonchi   Abdominal: Soft. He exhibits no distension.   Musculoskeletal: He exhibits no edema.   Neurological: He is alert and oriented to person, place, and time.   Skin: Skin is warm and dry. He is not diaphoretic. No erythema.   Psychiatric: He has a normal mood and affect. His behavior is normal. Thought content normal.   Nursing note and vitals reviewed.      Significant Labs:   All pertinent lab results from the last 24 hours have been reviewed. and   Recent Lab Results       11/06/19  0507        Anion Gap 10     Baso # 0.04     Basophil% 0.3     BUN, Bld 55     Calcium 9.0     Chloride 110     CO2 30     Creatinine 0.9     Differential Method Automated     eGFR if  >60     eGFR if non  >60  Comment:  Calculation used to obtain the estimated glomerular filtration  rate (eGFR) is the CKD-EPI equation.        Eos # 0.3     Eosinophil% 2.9     Glucose 88     Gran # (ANC) 9.2     Gran% 79.6     Hematocrit 44.1     Hemoglobin 13.9     Immature Grans (Abs) 0.23  Comment:  Mild elevation in immature granulocytes is non specific and   can be seen in a variety of conditions including stress response,   acute inflammation, trauma and pregnancy. Correlation with other   laboratory and clinical findings is essential.       Immature Granulocytes 2.0     Coumadin Monitoring INR 3.0  Comment:  Coumadin Therapy:  2.0 - 3.0 for INR for all indicators except mechanical heart valves  and antiphospholipid syndromes which should use 2.5 - 3.5.       Lymph # 0.9     Lymph% 7.5     Magnesium 2.3     MCH 31.9     MCHC 31.5          Mono # 0.9     Mono% 7.7     MPV 9.7     nRBC 0     Platelets 178     Potassium 3.9     Protime 31.6     RBC 4.36     RDW 14.4     Sodium 150     WBC 11.55           Significant Imaging: Echocardiogram: 2D echo with color flow doppler: No results found for this or any previous visit.

## 2019-11-07 VITALS
SYSTOLIC BLOOD PRESSURE: 131 MMHG | HEART RATE: 72 BPM | WEIGHT: 141.31 LBS | RESPIRATION RATE: 18 BRPM | TEMPERATURE: 97 F | DIASTOLIC BLOOD PRESSURE: 75 MMHG | HEIGHT: 75 IN | OXYGEN SATURATION: 94 % | BODY MASS INDEX: 17.57 KG/M2

## 2019-11-07 PROBLEM — I50.9 NEW ONSET OF CONGESTIVE HEART FAILURE: Status: RESOLVED | Noted: 2019-11-01 | Resolved: 2019-11-07

## 2019-11-07 PROBLEM — I48.91 ATRIAL FIBRILLATION WITH RVR: Status: RESOLVED | Noted: 2019-10-29 | Resolved: 2019-11-07

## 2019-11-07 LAB
ANION GAP SERPL CALC-SCNC: 11 MMOL/L (ref 8–16)
BASOPHILS # BLD AUTO: 0.04 K/UL (ref 0–0.2)
BASOPHILS NFR BLD: 0.3 % (ref 0–1.9)
BUN SERPL-MCNC: 49 MG/DL (ref 8–23)
CALCIUM SERPL-MCNC: 9.3 MG/DL (ref 8.7–10.5)
CHLORIDE SERPL-SCNC: 111 MMOL/L (ref 95–110)
CO2 SERPL-SCNC: 29 MMOL/L (ref 23–29)
CREAT SERPL-MCNC: 1 MG/DL (ref 0.5–1.4)
DIFFERENTIAL METHOD: ABNORMAL
EOSINOPHIL # BLD AUTO: 0.3 K/UL (ref 0–0.5)
EOSINOPHIL NFR BLD: 1.7 % (ref 0–8)
ERYTHROCYTE [DISTWIDTH] IN BLOOD BY AUTOMATED COUNT: 14.5 % (ref 11.5–14.5)
EST. GFR  (AFRICAN AMERICAN): >60 ML/MIN/1.73 M^2
EST. GFR  (NON AFRICAN AMERICAN): >60 ML/MIN/1.73 M^2
GLUCOSE SERPL-MCNC: 110 MG/DL (ref 70–110)
HCT VFR BLD AUTO: 44.2 % (ref 40–54)
HGB BLD-MCNC: 14.2 G/DL (ref 14–18)
IMM GRANULOCYTES # BLD AUTO: 0.22 K/UL (ref 0–0.04)
IMM GRANULOCYTES NFR BLD AUTO: 1.5 % (ref 0–0.5)
INR PPP: 3.9 (ref 0.8–1.2)
LYMPHOCYTES # BLD AUTO: 0.9 K/UL (ref 1–4.8)
LYMPHOCYTES NFR BLD: 5.9 % (ref 18–48)
MAGNESIUM SERPL-MCNC: 2.4 MG/DL (ref 1.6–2.6)
MCH RBC QN AUTO: 32.5 PG (ref 27–31)
MCHC RBC AUTO-ENTMCNC: 32.1 G/DL (ref 32–36)
MCV RBC AUTO: 101 FL (ref 82–98)
MONOCYTES # BLD AUTO: 0.9 K/UL (ref 0.3–1)
MONOCYTES NFR BLD: 5.7 % (ref 4–15)
NEUTROPHILS # BLD AUTO: 12.7 K/UL (ref 1.8–7.7)
NEUTROPHILS NFR BLD: 84.9 % (ref 38–73)
NRBC BLD-RTO: 0 /100 WBC
PLATELET # BLD AUTO: 183 K/UL (ref 150–350)
PMV BLD AUTO: 9.7 FL (ref 9.2–12.9)
POTASSIUM SERPL-SCNC: 4.3 MMOL/L (ref 3.5–5.1)
PROTHROMBIN TIME: 40.1 SEC (ref 9–12.5)
RBC # BLD AUTO: 4.37 M/UL (ref 4.6–6.2)
SODIUM SERPL-SCNC: 151 MMOL/L (ref 136–145)
WBC # BLD AUTO: 14.96 K/UL (ref 3.9–12.7)

## 2019-11-07 PROCEDURE — 80048 BASIC METABOLIC PNL TOTAL CA: CPT

## 2019-11-07 PROCEDURE — 83735 ASSAY OF MAGNESIUM: CPT

## 2019-11-07 PROCEDURE — 94640 AIRWAY INHALATION TREATMENT: CPT

## 2019-11-07 PROCEDURE — 25000003 PHARM REV CODE 250: Performed by: INTERNAL MEDICINE

## 2019-11-07 PROCEDURE — 85025 COMPLETE CBC W/AUTO DIFF WBC: CPT

## 2019-11-07 PROCEDURE — 25000003 PHARM REV CODE 250: Performed by: NURSE PRACTITIONER

## 2019-11-07 PROCEDURE — 94799 UNLISTED PULMONARY SVC/PX: CPT

## 2019-11-07 PROCEDURE — 27000221 HC OXYGEN, UP TO 24 HOURS

## 2019-11-07 PROCEDURE — A4216 STERILE WATER/SALINE, 10 ML: HCPCS | Performed by: NURSE PRACTITIONER

## 2019-11-07 PROCEDURE — 94664 DEMO&/EVAL PT USE INHALER: CPT

## 2019-11-07 PROCEDURE — 92611 MOTION FLUOROSCOPY/SWALLOW: CPT

## 2019-11-07 PROCEDURE — 25000242 PHARM REV CODE 250 ALT 637 W/ HCPCS: Performed by: NURSE PRACTITIONER

## 2019-11-07 PROCEDURE — A9698 NON-RAD CONTRAST MATERIALNOC: HCPCS | Performed by: EMERGENCY MEDICINE

## 2019-11-07 PROCEDURE — 92526 ORAL FUNCTION THERAPY: CPT

## 2019-11-07 PROCEDURE — 25500020 PHARM REV CODE 255: Performed by: EMERGENCY MEDICINE

## 2019-11-07 PROCEDURE — 25000003 PHARM REV CODE 250: Performed by: EMERGENCY MEDICINE

## 2019-11-07 PROCEDURE — 99900035 HC TECH TIME PER 15 MIN (STAT)

## 2019-11-07 PROCEDURE — 94761 N-INVAS EAR/PLS OXIMETRY MLT: CPT

## 2019-11-07 PROCEDURE — 85610 PROTHROMBIN TIME: CPT

## 2019-11-07 RX ORDER — FAMOTIDINE 20 MG/1
20 TABLET, FILM COATED ORAL DAILY
Qty: 30 TABLET | Refills: 11 | Status: ON HOLD
Start: 2019-11-07 | End: 2019-11-17 | Stop reason: HOSPADM

## 2019-11-07 RX ORDER — LISINOPRIL 20 MG/1
20 TABLET ORAL DAILY
Qty: 30 TABLET | Refills: 1 | Status: ON HOLD
Start: 2019-11-07 | End: 2019-11-17 | Stop reason: HOSPADM

## 2019-11-07 RX ORDER — CARBIDOPA AND LEVODOPA 25; 100 MG/1; MG/1
2 TABLET ORAL 3 TIMES DAILY
Qty: 90 TABLET | Refills: 1 | Status: ON HOLD
Start: 2019-11-07 | End: 2019-11-17 | Stop reason: HOSPADM

## 2019-11-07 RX ORDER — POLYETHYLENE GLYCOL 3350 17 G/17G
17 POWDER, FOR SOLUTION ORAL DAILY
Qty: 30 EACH | Refills: 0 | Status: ON HOLD
Start: 2019-11-08 | End: 2019-11-17 | Stop reason: HOSPADM

## 2019-11-07 RX ORDER — FUROSEMIDE 20 MG/1
20 TABLET ORAL DAILY PRN
Qty: 30 TABLET | Refills: 1 | Status: ON HOLD
Start: 2019-11-07 | End: 2019-11-17 | Stop reason: HOSPADM

## 2019-11-07 RX ORDER — WARFARIN 2.5 MG/1
2.5 TABLET ORAL DAILY
Qty: 30 TABLET | Refills: 11 | Status: ON HOLD
Start: 2019-11-10 | End: 2019-11-17 | Stop reason: HOSPADM

## 2019-11-07 RX ORDER — WARFARIN 2.5 MG/1
2.5 TABLET ORAL DAILY
Status: DISCONTINUED | OUTPATIENT
Start: 2019-11-10 | End: 2019-11-07 | Stop reason: HOSPADM

## 2019-11-07 RX ORDER — CARVEDILOL 6.25 MG/1
6.25 TABLET ORAL 2 TIMES DAILY
Qty: 60 TABLET | Refills: 11 | Status: ON HOLD
Start: 2019-11-07 | End: 2019-11-17 | Stop reason: HOSPADM

## 2019-11-07 RX ORDER — POLYETHYLENE GLYCOL 3350 17 G/17G
17 POWDER, FOR SOLUTION ORAL DAILY
Qty: 30 EACH | Refills: 0
Start: 2019-11-08 | End: 2019-11-07

## 2019-11-07 RX ORDER — AMOXICILLIN AND CLAVULANATE POTASSIUM 875; 125 MG/1; MG/1
1 TABLET, FILM COATED ORAL EVERY 12 HOURS
Status: ON HOLD
Start: 2019-11-07 | End: 2019-11-17 | Stop reason: HOSPADM

## 2019-11-07 RX ORDER — GUAIFENESIN 600 MG/1
600 TABLET, EXTENDED RELEASE ORAL 2 TIMES DAILY
Status: ON HOLD | COMMUNITY
Start: 2019-11-07 | End: 2019-11-17 | Stop reason: HOSPADM

## 2019-11-07 RX ADMIN — GUAIFENESIN 600 MG: 600 TABLET, EXTENDED RELEASE ORAL at 09:11

## 2019-11-07 RX ADMIN — FAMOTIDINE 20 MG: 20 TABLET ORAL at 09:11

## 2019-11-07 RX ADMIN — LISINOPRIL 20 MG: 20 TABLET ORAL at 09:11

## 2019-11-07 RX ADMIN — BARIUM SULFATE 20 G: 960 POWDER, FOR SUSPENSION ORAL at 11:11

## 2019-11-07 RX ADMIN — ARFORMOTEROL TARTRATE 15 MCG: 15 SOLUTION RESPIRATORY (INHALATION) at 07:11

## 2019-11-07 RX ADMIN — BUDESONIDE 0.5 MG: 0.5 INHALANT RESPIRATORY (INHALATION) at 07:11

## 2019-11-07 RX ADMIN — CARVEDILOL 6.25 MG: 6.25 TABLET, FILM COATED ORAL at 09:11

## 2019-11-07 RX ADMIN — IPRATROPIUM BROMIDE AND ALBUTEROL SULFATE 3 ML: .5; 3 SOLUTION RESPIRATORY (INHALATION) at 07:11

## 2019-11-07 RX ADMIN — Medication 10 ML: at 05:11

## 2019-11-07 RX ADMIN — AMOXICILLIN AND CLAVULANATE POTASSIUM 1 TABLET: 875; 125 TABLET, FILM COATED ORAL at 09:11

## 2019-11-07 RX ADMIN — IPRATROPIUM BROMIDE AND ALBUTEROL SULFATE 3 ML: .5; 3 SOLUTION RESPIRATORY (INHALATION) at 02:11

## 2019-11-07 RX ADMIN — POLYETHYLENE GLYCOL (3350) 17 G: 17 POWDER, FOR SOLUTION ORAL at 09:11

## 2019-11-07 RX ADMIN — CARBIDOPA AND LEVODOPA 1 TABLET: 25; 100 TABLET ORAL at 09:11

## 2019-11-07 NOTE — PROCEDURES
Modified Barium Swallow    Patient Name:  Shaq Huffman   MRN:  [id number]      Recommendations:     Recommendations:                General Recommendations:  Dysphagia therapy and Speech/language therapy  Diet recommendations:  NPO, NPO  If pt refuses NPO, safest po diet is puree with honey thick liquids.  Aspiration Precautions: double swallow, chin tuck, one sip at a time   General Precautions: Standard, aspiration  Communication strategies:  none    Referral     Reason for Referral  Patient was referred for a Modified Barium Swallow Study to assess the efficiency of his/her swallow function, rule out aspiration and make recommendations regarding safe dietary consistencies, effective compensatory strategies, and safe eating environment.     Diagnosis: Aspiration pneumonia of right lower lobe due to gastric secretions       History:     Past Medical History:   Diagnosis Date    A-fib     COPD (chronic obstructive pulmonary disease)        Objective:     Current Respiratory Status: 11/07/19    Alert: yes    Cooperative: yes    Follows Directions: yes    Visualization  · Patient was seen in the lateral view    Oral Peripheral Examination  · Oral Musculature: general weakness  · Dentition: upper and lower dentures  · Mucosal Quality: good    Consistencies Assessed  · Nectar thick liquids, puree    Oral Preparation/Oral Phase  · prolonged A-P transfer  · Decreased base of tongue mobility  · Premature spillage    Pharyngeal Phase   Decreased laryngeal elevation  Delayed swallow  Decreased nasopharyngeal closure  Decreased closure of laryngeal vestibule  Max residue throughout pharynx which he was unable to clear and small amounts of nectar thick liquids were silently aspirated  Residue on anterior wall that did not clear    Cervical Esophageal Phase  · UES appeared to accommodate all bolus types without stasis or retrograde movement observed     Assessment:     Impressions  ·  Pt presents with severe oropharyngeal  dysphagia characterized by poor A-P transfer, delayed swallow initiation, decreased tongue base retraction, decreased laryngeal elevation and pharyngeal squeezing resulting in max pharyngeal residue which he was unable to clear with consecutive weak swallows. He is at high risk of aspiration with all po intake.    Prognosis: Fair    Barriers:  · None    Plan  Speech therapy  Alternate form of nutrition/hydration    Education  Results were discussed with patient.    Goals:   Multidisciplinary Problems     SLP Goals        Problem: SLP Goal    Goal Priority Disciplines Outcome   SLP Goal     SLP Ongoing, Not Progressing   Description:  1. Pt will complete oral and pharyngeal exercises with min A for increased strength and ROM  2. Pt will tolerate least restrictive diet without incidence and while maintaining airway integrity                     Plan:   · Patient to be seen:  Therapy Frequency: 3 x/week   · Plan of Care expires:  11/08/19  · Plan of Care reviewed with:  patient, family        Discharge recommendations:  nursing facility, skilled   Barriers to Discharge:      Time Tracking:   SLP Treatment Date:   11/07/19  Speech Start Time:  0922  Speech Stop Time:  0942     Speech Total Time (min):  20 min    Danielle Willis CCC-SLP  11/07/2019

## 2019-11-07 NOTE — ASSESSMENT & PLAN NOTE
10/29:  Likely 2/2 to aspiration PNA  Sputum culture pending  Blood culture pending  On vanc, cefepime, and flagyl  Continue to wean vent as tolerated   Reviewed blood gas     10/30:  Currently on vent   Sputum cultures pending  Continue current antibiotics   Wean vent as tolerated   Reviewed blood gas today  Will order procal for trend in am     S/p vent support, continue supportive care  Continue steroids    Improving    Will check Home o2 eval in am    Home O2 eval in am  Repeat CXR     Qualifies for Home O2 as sats dropped with exercise to 87% on RA

## 2019-11-07 NOTE — PT/OT/SLP PROGRESS
Speech Language Pathology Treatment    Patient Name:  Shaq Huffman   MRN:  [id number]  Admitting Diagnosis: Aspiration pneumonia of right lower lobe due to gastric secretions    Recommendations:                 General Recommendations:  Dysphagia therapy and Modified barium swallow study  Diet recommendations:  NPO, Liquid Diet Level: NPO If pt refuses NPO, then diet of puree and honey thick liquids is safest po consistency  Aspiration Precautions:    General Precautions: Standard, aspiration  Communication strategies:  none    Subjective     Pt appears weaker and less alert today.  Patient goals: to eat    Pain/Comfort:  · Pain Rating 1: 0/10  · Pain Rating Post-Intervention 1: 0/10    Objective:     Has the patient been evaluated by SLP for swallowing?   Yes  Keep patient NPO? Yes   Current Respiratory Status: nasal cannula      Pt completed oral motor, tongue base retraction and laryngeal elevation ex x 15 each with mod cues and SOB noted. Pt took breaks as needed.    Assessment:     Shaq Huffman is a 86 y.o. male with an SLP diagnosis of Dysphagia.  He presents with decreased oral motor and pharyngeal strength and increased risk of aspiration.    Goals:   Multidisciplinary Problems     SLP Goals        Problem: SLP Goal    Goal Priority Disciplines Outcome   SLP Goal     SLP Ongoing, Not Progressing   Description:  1. Pt will complete oral and pharyngeal exercises with min A for increased strength and ROM  2. Pt will tolerate least restrictive diet without incidence and while maintaining airway integrity                     Plan:     · Patient to be seen:  3 x/week   · Plan of Care expires:  11/08/19  · Plan of Care reviewed with:  patient, family   · SLP Follow-Up:  Yes       Discharge recommendations:  nursing facility, skilled   Barriers to Discharge:  None    Time Tracking:     SLP Treatment Date:   11/07/19  Speech Start Time:  0922  Speech Stop Time:  0942     Speech Total Time (min):  20  min    Billable Minutes: Treatment Swallowing Dysfunction 20    Danielle Willis CCC-SLP  11/07/2019

## 2019-11-07 NOTE — SUBJECTIVE & OBJECTIVE
Interval History:  improving daily, getting stronger and more alert and responsive. Urine dark, Na 150-- needs free water which has to be thickened-- hence not drinking much.. Still has some chest gurgling but CXR much better. Arrangements made for In pt Rehab/ SNF for a few days before flying back to [place].     Review of Systems   Unable to perform ROS: Acuity of condition   Constitutional: Positive for activity change and appetite change. Negative for fatigue and fever.   HENT: Positive for congestion.    Eyes: Negative.    Respiratory: Positive for cough and shortness of breath.    Cardiovascular: Negative for palpitations and leg swelling.   Gastrointestinal: Negative.  Negative for nausea and vomiting.   Endocrine: Negative.    Genitourinary: Negative.    Musculoskeletal: Positive for gait problem.   Skin: Negative for rash.   Neurological: Positive for weakness.     Objective:     Vital Signs (Most Recent):  Temp: 97 °F (36.1 °C) (11/06/19 1207)  Pulse: 89 (11/06/19 1400)  Resp: 18 (11/06/19 1400)  BP: (!) 72/47 (11/06/19 1207)  SpO2: (!) 93 % (11/06/19 1400) Vital Signs (24h Range):  Temp:  [97 °F (36.1 °C)-98.9 °F (37.2 °C)] 97 °F (36.1 °C)  Pulse:  [79-90] 89  Resp:  [18-24] 18  SpO2:  [87 %-97 %] 93 %  BP: ()/(47-91) 72/47     Weight: 63.8 kg (140 lb 10.5 oz)  Body mass index is 17.58 kg/m².    Intake/Output Summary (Last 24 hours) at 11/6/2019 1816  Last data filed at 11/6/2019 0600  Gross per 24 hour   Intake 120 ml   Output --   Net 120 ml      Physical Exam   Constitutional: He appears lethargic. He has a sickly appearance. No distress. Nasal cannula in place.   Frail elderly man looks better, NGT out   HENT:   Head: Normocephalic and atraumatic.   edentulous   Eyes: Pupils are equal, round, and reactive to light. Conjunctivae and EOM are normal.   Neck: Normal range of motion. Neck supple.   Cardiovascular: Exam reveals no gallop and no friction rub.   No murmur heard.  Irregular irregular    Pulmonary/Chest: No stridor. No respiratory distress. He has no wheezes. He has rales.   Abdominal: Soft. Bowel sounds are normal. He exhibits no distension and no mass. There is no tenderness. There is no guarding.   Genitourinary:   Genitourinary Comments: deferred   Musculoskeletal: He exhibits no edema.   Neurological: He appears lethargic.   Skin: Skin is warm. No rash noted. He is not diaphoretic.   Psychiatric: He has a normal mood and affect. His behavior is normal.   Nursing note and vitals reviewed.      Significant Labs:   BMP:   Recent Labs   Lab 11/06/19  0507   GLU 88   *   K 3.9      CO2 30*   BUN 55*   CREATININE 0.9   CALCIUM 9.0   MG 2.3     CBC:   Recent Labs   Lab 11/05/19  0432 11/06/19  0507   WBC 10.18 11.55   HGB 15.5 13.9*   HCT 43.1 44.1    178     All pertinent labs within the past 24 hours have been reviewed.    Significant Imaging: I have reviewed all pertinent imaging results/findings within the past 24 hours.

## 2019-11-07 NOTE — ASSESSMENT & PLAN NOTE
11/1:  Will start PT/OT/ST to evaluate for any possible needs     Critical Care Myopathy    Continue PT/OT    Needs SNF or Rehab

## 2019-11-07 NOTE — ASSESSMENT & PLAN NOTE
10/31:  Continue vanc, cefepime, and flagyl  procalcitonin trending up  WBC within normal limits  Chest xray ordered  Continue to monitor for response  Repeat procalcitonin in am  Sputum culture normal mikel    11/1:  Currently on vanc, cefepime and flagyl   procalcitonin repeated today trending down   Continue current abx  Plan to dc vanc in am   Continue to monitor procal for response   Blood cultures no growth  Will start PT/OT prior to discharge  Discussion was had with son who states he can come down and drive patient up to the [facility]   Informed son that if patient is stable for discharge he can likely fly home    Looks quiet sick and frail, still has congestion, very weak and easily dyspneic  Continue present care  Prognosis guarded to poor.    11/3- improving  Continue present care    11/4- improving, continue present care  11/5- augmentin started by ID    Improving, continue Augmentin.   IS very weak, still has lot of gurgling.

## 2019-11-07 NOTE — PLAN OF CARE
D/C'ed to [facility] for therapy to strengthen enough for trip home in [place]. Report called in to WILL Silveira. Significant other & son remained @ bedside throughout shift. POC/DC orders explained, questions encouraged/answered, educated when required; able to verbalize understanding. Genevieve w/ stat-lock secured to LLE, patent. PICC to LAC in place & patent. Transportation p/u @ 17:35. Condition stable; in good spirits; hopeful. Denies pain. VS SDL.

## 2019-11-07 NOTE — PROGRESS NOTES
[facility] Hospital Medicine  Progress Note    Patient Name: Shaq Huffman  MRN: [id number]  Patient Class: IP- Inpatient   Admission Date: 10/29/2019  Length of Stay: 8 days  Attending Physician: Kalen Allan MD  Primary Care Provider: Provider Notinsystem        Subjective:     Principal Problem:Aspiration pneumonia of right lower lobe due to gastric secretions        HPI:  Patient is an 87 y/o male with a PMH of parkinsons, afib, GERD, COPD presents as transfer from [facility] for respiratory failure. Patient was intubated prior to arrival and history was obtained by partner. She reports her and patient were on a cruise leaving out of [place] and patient developed some breathing difficulty. Cruise healthcare professionals were called patient was taken to [facility]. He was intubated there due to respiratory insufficiency with hypercarbia and transferred to [facility]. She denied any sick contacts or recent hospitalizations.     Overview/Hospital Course:  10/30:  Still on vent, cont current antibiotics, wean vent as tolerated    10/31:  Extubated this am, will continue to monitor    11/1:   Patient doing well, was informed by ICU staff that girlfriend states patient is a DNR. Discussion was had with Son (Shabbir Huffman) who is power of  [phone] and he confirmed DNR status. Patient likely to be stepped down from ICU today.   11/2: pt seen and examined, chart reviewed, d/w pt's partner and his son. Frail elderly man lying in bed, appears sick. NC and NGT in place, getting TF. CXR shows large R sided pneumonia, no dentures at present. WBC high with L shift. Bun up to 50.   11/3- looks lot better, more alert and responsive. Talking, getting TF via NGT, tolerating it well. Did PT/OT and moving all 4 ext in bed. Bun increased-- hence lasix decreased and free water increased via NGT. Hopefully will be able to take oral food in a day or two.   11/4- continues to make  progress, swallow improved, NG d/garo. Was able to eat pureed diet and swallow pills well, no sore throat. Getting stronger, will continue PT/OT. Will give inj B12 IM.  11/5- looks lot better, sitting up in chair, eating drinking a little, walked 35 feet with PT, feels getting stronger. Still has some Phlegm in chest. Ordered Chest PT, IS and Mucinex.   11/6- improving daily, getting stronger and more alert and responsive. Urine dark, Na 150-- needs free water which has to be thickened-- hence not drinking much.. Still has some chest gurgling but CXR much better. Arrangements made for In pt Rehab/ SNF for a few days before flying back to [place].     Interval History:  improving daily, getting stronger and more alert and responsive. Urine dark, Na 150-- needs free water which has to be thickened-- hence not drinking much.. Still has some chest gurgling but CXR much better. Arrangements made for In pt Rehab/ SNF for a few days before flying back to [place].     Review of Systems   Unable to perform ROS: Acuity of condition   Constitutional: Positive for activity change and appetite change. Negative for fatigue and fever.   HENT: Positive for congestion.    Eyes: Negative.    Respiratory: Positive for cough and shortness of breath.    Cardiovascular: Negative for palpitations and leg swelling.   Gastrointestinal: Negative.  Negative for nausea and vomiting.   Endocrine: Negative.    Genitourinary: Negative.    Musculoskeletal: Positive for gait problem.   Skin: Negative for rash.   Neurological: Positive for weakness.     Objective:     Vital Signs (Most Recent):  Temp: 97 °F (36.1 °C) (11/06/19 1207)  Pulse: 89 (11/06/19 1400)  Resp: 18 (11/06/19 1400)  BP: (!) 72/47 (11/06/19 1207)  SpO2: (!) 93 % (11/06/19 1400) Vital Signs (24h Range):  Temp:  [97 °F (36.1 °C)-98.9 °F (37.2 °C)] 97 °F (36.1 °C)  Pulse:  [79-90] 89  Resp:  [18-24] 18  SpO2:  [87 %-97 %] 93 %  BP: ()/(47-91) 72/47     Weight: 63.8 kg (140 lb  10.5 oz)  Body mass index is 17.58 kg/m².    Intake/Output Summary (Last 24 hours) at 11/6/2019 1816  Last data filed at 11/6/2019 0600  Gross per 24 hour   Intake 120 ml   Output --   Net 120 ml      Physical Exam   Constitutional: He appears lethargic. He has a sickly appearance. No distress. Nasal cannula in place.   Frail elderly man looks better, NGT out   HENT:   Head: Normocephalic and atraumatic.   edentulous   Eyes: Pupils are equal, round, and reactive to light. Conjunctivae and EOM are normal.   Neck: Normal range of motion. Neck supple.   Cardiovascular: Exam reveals no gallop and no friction rub.   No murmur heard.  Irregular irregular   Pulmonary/Chest: No stridor. No respiratory distress. He has no wheezes. He has rales.   Abdominal: Soft. Bowel sounds are normal. He exhibits no distension and no mass. There is no tenderness. There is no guarding.   Genitourinary:   Genitourinary Comments: deferred   Musculoskeletal: He exhibits no edema.   Neurological: He appears lethargic.   Skin: Skin is warm. No rash noted. He is not diaphoretic.   Psychiatric: He has a normal mood and affect. His behavior is normal.   Nursing note and vitals reviewed.      Significant Labs:   BMP:   Recent Labs   Lab 11/06/19  0507   GLU 88   *   K 3.9      CO2 30*   BUN 55*   CREATININE 0.9   CALCIUM 9.0   MG 2.3     CBC:   Recent Labs   Lab 11/05/19  0432 11/06/19  0507   WBC 10.18 11.55   HGB 15.5 13.9*   HCT 43.1 44.1    178     All pertinent labs within the past 24 hours have been reviewed.    Significant Imaging: I have reviewed all pertinent imaging results/findings within the past 24 hours.      Assessment/Plan:      * Aspiration pneumonia of right lower lobe due to gastric secretions  10/31:  Continue vanc, cefepime, and flagyl  procalcitonin trending up  WBC within normal limits  Chest xray ordered  Continue to monitor for response  Repeat procalcitonin in am  Sputum culture normal  mikel    11/1:  Currently on vanc, cefepime and flagyl   procalcitonin repeated today trending down   Continue current abx  Plan to dc vanc in am   Continue to monitor procal for response   Blood cultures no growth  Will start PT/OT prior to discharge  Discussion was had with son who states he can come down and drive patient up to the [facility]   Informed son that if patient is stable for discharge he can likely fly home    Looks quiet sick and frail, still has congestion, very weak and easily dyspneic  Continue present care  Prognosis guarded to poor.    11/3- improving  Continue present care    11/4- improving, continue present care  11/5- augmentin started by ID    Improving, continue Augmentin.   IS very weak, still has lot of gurgling.    New onset of congestive heart failure  11/1:  Reviewed echo  No history of chf reported  EF of 30% here  Will consult cards on case    On IV lasix, dose reduced    Getting better      Physical deconditioning  11/1:  Will start PT/OT/ST to evaluate for any possible needs     Critical Care Myopathy    Continue PT/OT    Needs SNF or Rehab      Parkinson disease  10/29:  Resume home meds      Panlobular emphysema  On nebs      Acute respiratory failure with hypoxia  10/29:  Likely 2/2 to aspiration PNA  Sputum culture pending  Blood culture pending  On vanc, cefepime, and flagyl  Continue to wean vent as tolerated   Reviewed blood gas     10/30:  Currently on vent   Sputum cultures pending  Continue current antibiotics   Wean vent as tolerated   Reviewed blood gas today  Will order procal for trend in am     S/p vent support, continue supportive care  Continue steroids    Improving    Will check Home o2 eval in am    Home O2 eval in am  Repeat CXR     Qualifies for Home O2 as sats dropped with exercise to 87% on RA      Atrial fibrillation with RVR  10/29:  Not on rate control medical medications at home  HR ok for now  On coumadin   Pharm to dose   Metoprolol IV PRN      10/30:  Rate controlled     10/31:  Controlled     11/1:  Rate controlled, coumadin managed by pharm     Remains in Afib, rate controlled    Rate controlled      VTE Risk Mitigation (From admission, onward)         Ordered     warfarin (COUMADIN) tablet 2.5 mg  Daily      11/06/19 0935     Place sequential compression device  Until discontinued      10/29/19 1630     IP VTE HIGH RISK PATIENT  Once      10/29/19 1630                      Kalen Allan MD  Department of Hospital Medicine   [facility]

## 2019-11-07 NOTE — PLAN OF CARE
"Pt, son & significant other informed of POC; able to verbalize understanding. Calm/cooperative w/ some confusion noted w/ recent orders (e.g. Told PCT he did not require thickened liquids shortly after being reminded of such). On waffle mattress to prevent skin injury. A-fib noted. No obvious s/sx of distress noted. PICC patent/saline locked. Indwellling urinary catheter in place/patent w/ clear, pierre-colored urine noted; cath ordered by urologist for urinary retention & comfort measures. No c/o discomfort. Max assist w/ ADLs. Per significant other, condition appears weaker today than yesterday. Verbalized [facility] for [facility]"; currently resides in [place] w/ significant other. Weak slightly productive cough noted d/t pneumonia. C/O no appetite; stated he is tired of yogurt & applesauce. Offered ice cream, mike crackers & pudding, as well as Boost w/ meals. Safety precautions in place. Advised to call for assistance; verbalized understanding. Call light w/in reach. Will continue to monitor throughout shift.     "

## 2019-11-07 NOTE — PROGRESS NOTES
Pharmacy Coumadin Consult Note    INR=3.9 (up from 3.0)  HOLD DOSE--pushed out to Ck 11/10  Hx of Afib    Goal INR=2-3  Current dose: Warfarin 2.5mg daily  Due to jump in INR, HOLD DOSE    Daily INR ordered  Pharmacy is consulted to dose.  Patient has been educated.    Pharmacy will monitor daily INR levels and make dosage adjustments when necessary.  Thank you for allowing us to participate in this patient's care.

## 2019-11-08 NOTE — DISCHARGE SUMMARY
[facility] Hospital Medicine  Discharge Summary      Patient Name: Shaq Huffman  MRN: [id number]  Admission Date: 10/29/2019  Hospital Length of Stay: 9 days  Discharge Date and Time:  11/07/2019 6:24 PM  Attending Physician: No att. providers found   Discharging Provider: Kalen Allan MD  Primary Care Provider: Provider Notinsystem      HPI:   Patient is an 87 y/o male with a PMH of parkinsons, afib, GERD, COPD presents as transfer from [facility] for respiratory failure. Patient was intubated prior to arrival and history was obtained by partner. She reports her and patient were on a cruise leaving out of [place] and patient developed some breathing difficulty. Cruise healthcare professionals were called patient was taken to [facility]. He was intubated there due to respiratory insufficiency with hypercarbia and transferred to [facility]. She denied any sick contacts or recent hospitalizations.     * No surgery found *      Hospital Course:   10/30:  Still on vent, cont current antibiotics, wean vent as tolerated    10/31:  Extubated this am, will continue to monitor    11/1:   Patient doing well, was informed by ICU staff that girlfriend states patient is a DNR. Discussion was had with Son (Shabbir Huffman) who is power of  [phone] and he confirmed DNR status. Patient likely to be stepped down from ICU today.   11/2: pt seen and examined, chart reviewed, d/w pt's partner and his son. Frail elderly man lying in bed, appears sick. NC and NGT in place, getting TF. CXR shows large R sided pneumonia, no dentures at present. WBC high with L shift. Bun up to 50.   11/3- looks lot better, more alert and responsive. Talking, getting TF via NGT, tolerating it well. Did PT/OT and moving all 4 ext in bed. Bun increased-- hence lasix decreased and free water increased via NGT. Hopefully will be able to take oral food in a day or two.   11/4- continues to make progress, swallow  improved, NG d/garo. Was able to eat pureed diet and swallow pills well, no sore throat. Getting stronger, will continue PT/OT. Will give inj B12 IM.  11/5- looks lot better, sitting up in chair, eating drinking a little, walked 35 feet with PT, feels getting stronger. Still has some Phlegm in chest. Ordered Chest PT, IS and Mucinex.   11/6- improving daily, getting stronger and more alert and responsive. Urine dark, Na 150-- needs free water which has to be thickened-- hence not drinking much. Still has some chest gurgling but CXR much better. Arrangements made for In pt Rehab/ SNF for a few days before flying back to [place].   11/7- continues to look better, however failed his MBSS this am. [facility] Pt was offered NGT placement vs PEG tube placement in front of Ms Lind and his son Shabbir-- he refused both and prefers to be discharged to Rehab and continue his current Pureed Diet with Nectar thick fluids. His Bun is coming down gradually however his Na is 150. His CXR shows much improvement with clearance of Pneumonia. Pt and his family feel confident that he is recovering well and will continue to do well in the rehab and will be able to fly back to [place] very soon. He was seen and examined and deemed stable to discharge to [facility] today.       Consults:   Consults (From admission, onward)        Status Ordering Provider     Inpatient consult to Cardiology  Once     Provider:  Jg Cobian MD    Completed LE, ANTWAN     Inpatient consult to Infectious Diseases  Once     Provider:  Mike Guthrie MD    Acknowledged JOE WATTS     [facility]  Once     Provider:  (Not yet assigned)    Acknowledged MIKE DIAS     Inpatient consult to Pulmonology  Once     Provider:  (Not yet assigned)    Completed LE, ANTWAN     Inpatient consult to Registered Dietitian/Nutritionist  Once     Provider:  (Not yet assigned)    Completed LE, ANTWAN     Inpatient  consult to Social Work  Once     Provider:  (Not yet assigned)    Completed JOE WATTS     Pharmacy to dose Warfarin consult  Once     Provider:  (Not yet assigned)    Acknowledged MIKE DIAS          No new Assessment & Plan notes have been filed under this hospital service since the last note was generated.  Service: Hospital Medicine    Final Active Diagnoses:    Diagnosis Date Noted POA    PRINCIPAL PROBLEM:  Aspiration pneumonia of right lower lobe due to gastric secretions [J69.0] 10/29/2019 Yes    Physical deconditioning [R53.81] 11/01/2019 Yes    Acute respiratory failure with hypoxia [J96.01] 10/29/2019 Yes    Panlobular emphysema [J43.1] 10/29/2019 Yes     Chronic    Parkinson disease [G20] 10/29/2019 Yes     Chronic      Problems Resolved During this Admission:    Diagnosis Date Noted Date Resolved POA    New onset of congestive heart failure [I50.9] 11/01/2019 11/07/2019 Yes    Coumadin toxicity [T45.511A] 10/31/2019 11/03/2019 Yes    SOB (shortness of breath) [R06.02] 10/29/2019 11/03/2019 Yes    Atrial fibrillation with RVR [I48.91] 10/29/2019 11/07/2019 Yes    Metabolic acidosis with respiratory acidosis [E87.4] 10/29/2019 11/04/2019 Yes    Sepsis [A41.9] 10/29/2019 11/03/2019 Yes    Elevated troponin [R79.89] 10/29/2019 11/03/2019 Yes    On mechanically assisted ventilation [Z99.11] 10/29/2019 11/01/2019 Not Applicable       Discharged Condition: fair    Disposition: Rehab Facility    Follow Up:    Patient Instructions:      Activity as tolerated       Significant Diagnostic Studies: Labs:   BMP:   Recent Labs   Lab 11/06/19  0507 11/07/19  0415   GLU 88 110   * 151*   K 3.9 4.3    111*   CO2 30* 29   BUN 55* 49*   CREATININE 0.9 1.0   CALCIUM 9.0 9.3   MG 2.3 2.4   , CMP   Recent Labs   Lab 11/06/19  0507 11/07/19  0415   * 151*   K 3.9 4.3    111*   CO2 30* 29   GLU 88 110   BUN 55* 49*   CREATININE 0.9 1.0   CALCIUM 9.0 9.3   ANIONGAP 10 11    ESTGFRAFRICA >60 >60   EGFRNONAA >60 >60   , CBC   Recent Labs   Lab 11/06/19  0507 11/07/19  0415   WBC 11.55 14.96*   HGB 13.9* 14.2   HCT 44.1 44.2    183   All labs within the past 24 hours have been reviewed  Microbiology:   Blood Culture   Lab Results   Component Value Date    LABBLOO No growth after 5 days. 10/29/2019     Radiology: X-Ray: CXR: X-Ray Chest 1 View (CXR):   Results for orders placed or performed during the hospital encounter of 10/29/19   X-Ray Chest 1 View    Narrative    EXAMINATION:  XR CHEST 1 VIEW    CLINICAL HISTORY:  f/u Pneumonia, CHF;.    TECHNIQUE:  Single frontal portable view of the chest was performed.    COMPARISON:  11/03/2019    FINDINGS:  Support devices: Telemetry leads.  Left-sided PICC line.  Interval removal of the esophagogastric tube.    Chronic interstitial coarsening again noted, with suggestion of upper lobe predominant centrilobular emphysema.    Interval improvement in appearance of ill-defined patchy opacification at the right lower lobe.  Interval reduction in right pleural effusion.  Unchanged mild cardiomegaly.  No pneumothorax.    Bones are intact.      Impression    Interval improvement in appearance of ill-defined patchy opacification at the right lower lobe, with some mild residual opacification there.  Interval reduction in right pleural effusion. Unchanged mild cardiomegaly.      Electronically signed by: Shorty De La Torre  Date:    11/06/2019  Time:    08:05     Cardiac Graphics: Echocardiogram: 2D echo with color flow doppler: No results found for this or any previous visit.    Pending Diagnostic Studies:     None         Medications:  Reconciled Home Medications:      Medication List      START taking these medications    amoxicillin-clavulanate 875-125mg 875-125 mg per tablet  Commonly known as:  AUGMENTIN  Take 1 tablet by mouth every 12 (twelve) hours.     carvedilol 6.25 MG tablet  Commonly known as:  COREG  Take 1 tablet (6.25 mg total) by mouth  2 (two) times daily.     famotidine 20 MG tablet  Commonly known as:  PEPCID  Take 1 tablet (20 mg total) by mouth once daily.     guaiFENesin 600 mg 12 hr tablet  Commonly known as:  MUCINEX  Take 1 tablet (600 mg total) by mouth 2 (two) times daily.     polyethylene glycol 17 gram Pwpk  Commonly known as:  GLYCOLAX  Take 17 g by mouth once daily.  Start taking on:  November 8, 2019        CHANGE how you take these medications    carbidopa-levodopa  mg  mg per tablet  Commonly known as:  SINEMET  Take 2 tablets by mouth 3 (three) times daily.  What changed:    · how much to take  · how to take this  · when to take this     furosemide 20 MG tablet  Commonly known as:  LASIX  Take 1 tablet (20 mg total) by mouth daily as needed (SOB).  What changed:    · how much to take  · how to take this  · when to take this  · reasons to take this     lisinopril 20 MG tablet  Commonly known as:  PRINIVIL,ZESTRIL  Take 1 tablet (20 mg total) by mouth once daily.  What changed:    · how much to take  · how to take this  · when to take this     warfarin 2.5 MG tablet  Commonly known as:  COUMADIN  Take 1 tablet (2.5 mg total) by mouth Daily.  Start taking on:  November 10, 2019  What changed:    · medication strength  · how much to take  · how to take this  · when to take this  · These instructions start on November 10, 2019. If you are unsure what to do until then, ask your doctor or other care provider.        CONTINUE taking these medications    albuterol 2.5 mg /3 mL (0.083 %) nebulizer solution  Commonly known as:  PROVENTIL  Take 2.5 mg by nebulization every 6 (six) hours as needed for Wheezing. Rescue     brimonidine 0.15 % OPTH DROP 0.15 % ophthalmic solution  Commonly known as:  ALPHAGAN  1 drop 3 (three) times daily.     fluticasone propionate 110 mcg/actuation inhaler  Commonly known as:  FLOVENT HFA  Inhale 1 puff into the lungs 2 (two) times daily. Controller     PRESERVISION LUTEIN ORAL  Take by mouth.      SIMBRINZA OPHT  Apply to eye.     vitamin D 1000 units Tab  Commonly known as:  VITAMIN D3  Take 1,000 Units by mouth once daily.        STOP taking these medications    bevacizumab 2.5 mg/0.10 mL injection  Commonly known as:  AVASTIN            Indwelling Lines/Drains at time of discharge:   Lines/Drains/Airways     Peripherally Inserted Central Catheter Line                 PICC Double Lumen 10/30/19 0445 left brachial 8 days          Drain                 Urethral Catheter 10/29/19 1440 16 Fr. 9 days                Time spent on the discharge of patient: 67 minutes  Patient was seen and examined on the date of discharge and determined to be suitable for discharge.         Kalen Allan MD  Department of Hospital Medicine  [facility]

## 2019-11-10 ENCOUNTER — HOSPITAL ENCOUNTER (INPATIENT)
Facility: HOSPITAL | Age: 84
LOS: 6 days | Discharge: HOSPICE | DRG: 193 | End: 2019-11-17
Attending: EMERGENCY MEDICINE | Admitting: INTERNAL MEDICINE
Payer: MEDICARE

## 2019-11-10 DIAGNOSIS — R13.10 DYSPHAGIA, UNSPECIFIED TYPE: ICD-10-CM

## 2019-11-10 DIAGNOSIS — R41.82 ALTERED MENTAL STATUS: ICD-10-CM

## 2019-11-10 DIAGNOSIS — N39.0 ACUTE LOWER UTI: ICD-10-CM

## 2019-11-10 DIAGNOSIS — J18.9 PNEUMONIA OF RIGHT LOWER LOBE DUE TO INFECTIOUS ORGANISM: Primary | ICD-10-CM

## 2019-11-10 DIAGNOSIS — R41.82 ALTERED MENTAL STATUS, UNSPECIFIED ALTERED MENTAL STATUS TYPE: ICD-10-CM

## 2019-11-10 PROBLEM — R79.1 SUPRATHERAPEUTIC INR: Status: ACTIVE | Noted: 2019-11-10

## 2019-11-10 PROBLEM — E44.0 MALNUTRITION OF MODERATE DEGREE: Status: ACTIVE | Noted: 2019-11-10

## 2019-11-10 LAB
ALBUMIN SERPL BCP-MCNC: 2.5 G/DL (ref 3.5–5.2)
ALLENS TEST: NORMAL
ALP SERPL-CCNC: 181 U/L (ref 55–135)
ALT SERPL W/O P-5'-P-CCNC: <5 U/L (ref 10–44)
AMORPH CRY URNS QL MICRO: ABNORMAL
ANION GAP SERPL CALC-SCNC: 8 MMOL/L (ref 8–16)
AST SERPL-CCNC: 26 U/L (ref 10–40)
BACTERIA #/AREA URNS HPF: ABNORMAL /HPF
BASOPHILS # BLD AUTO: 0.04 K/UL (ref 0–0.2)
BASOPHILS NFR BLD: 0.4 % (ref 0–1.9)
BILIRUB SERPL-MCNC: 1.7 MG/DL (ref 0.1–1)
BILIRUB UR QL STRIP: ABNORMAL
BUN SERPL-MCNC: 32 MG/DL (ref 8–23)
CALCIUM SERPL-MCNC: 9 MG/DL (ref 8.7–10.5)
CHLORIDE SERPL-SCNC: 117 MMOL/L (ref 95–110)
CK SERPL-CCNC: 34 U/L (ref 20–200)
CLARITY UR: CLEAR
CO2 SERPL-SCNC: 26 MMOL/L (ref 23–29)
COLOR UR: YELLOW
CREAT SERPL-MCNC: 0.9 MG/DL (ref 0.5–1.4)
DELSYS: NORMAL
DIFFERENTIAL METHOD: ABNORMAL
EOSINOPHIL # BLD AUTO: 0.1 K/UL (ref 0–0.5)
EOSINOPHIL NFR BLD: 1.5 % (ref 0–8)
ERYTHROCYTE [DISTWIDTH] IN BLOOD BY AUTOMATED COUNT: 14.5 % (ref 11.5–14.5)
EST. GFR  (AFRICAN AMERICAN): >60 ML/MIN/1.73 M^2
EST. GFR  (NON AFRICAN AMERICAN): >60 ML/MIN/1.73 M^2
FIO2: 100
FLOW: 15
GLUCOSE SERPL-MCNC: 82 MG/DL (ref 70–110)
GLUCOSE UR QL STRIP: NEGATIVE
HCO3 UR-SCNC: 26.4 MMOL/L (ref 24–28)
HCT VFR BLD AUTO: 42.5 % (ref 40–54)
HCT VFR BLD AUTO: 44 % (ref 40–54)
HGB BLD-MCNC: 13.2 G/DL (ref 14–18)
HGB BLD-MCNC: 13.6 G/DL (ref 14–18)
HGB UR QL STRIP: ABNORMAL
HYALINE CASTS #/AREA URNS LPF: 1 /LPF
IMM GRANULOCYTES # BLD AUTO: 0.04 K/UL (ref 0–0.04)
IMM GRANULOCYTES NFR BLD AUTO: 0.4 % (ref 0–0.5)
INFLUENZA A, MOLECULAR: NEGATIVE
INFLUENZA B, MOLECULAR: NEGATIVE
INR PPP: 7 (ref 0.8–1.2)
KETONES UR QL STRIP: ABNORMAL
LACTATE SERPL-SCNC: 1.1 MMOL/L (ref 0.5–2.2)
LACTATE SERPL-SCNC: 1.1 MMOL/L (ref 0.5–2.2)
LEUKOCYTE ESTERASE UR QL STRIP: NEGATIVE
LYMPHOCYTES # BLD AUTO: 0.7 K/UL (ref 1–4.8)
LYMPHOCYTES NFR BLD: 7.7 % (ref 18–48)
MAGNESIUM SERPL-MCNC: 2.2 MG/DL (ref 1.6–2.6)
MCH RBC QN AUTO: 32.7 PG (ref 27–31)
MCHC RBC AUTO-ENTMCNC: 30.9 G/DL (ref 32–36)
MCV RBC AUTO: 106 FL (ref 82–98)
MICROSCOPIC COMMENT: ABNORMAL
MODE: NORMAL
MONOCYTES # BLD AUTO: 0.7 K/UL (ref 0.3–1)
MONOCYTES NFR BLD: 7.7 % (ref 4–15)
NEUTROPHILS # BLD AUTO: 7.8 K/UL (ref 1.8–7.7)
NEUTROPHILS NFR BLD: 82.3 % (ref 38–73)
NITRITE UR QL STRIP: POSITIVE
NRBC BLD-RTO: 0 /100 WBC
PCO2 BLDA: 42.3 MMHG (ref 35–45)
PH SMN: 7.4 [PH] (ref 7.35–7.45)
PH UR STRIP: 5 [PH] (ref 5–8)
PHOSPHATE SERPL-MCNC: 2.9 MG/DL (ref 2.7–4.5)
PLATELET # BLD AUTO: 189 K/UL (ref 150–350)
PMV BLD AUTO: 10.1 FL (ref 9.2–12.9)
PO2 BLDA: 80 MMHG (ref 80–100)
POC BE: 2 MMOL/L
POC SATURATED O2: 96 % (ref 95–100)
POTASSIUM SERPL-SCNC: 4.2 MMOL/L (ref 3.5–5.1)
PROCALCITONIN SERPL IA-MCNC: 0.21 NG/ML
PROT SERPL-MCNC: 6.4 G/DL (ref 6–8.4)
PROT UR QL STRIP: ABNORMAL
PROTHROMBIN TIME: 70.7 SEC (ref 9–12.5)
RBC # BLD AUTO: 4.16 M/UL (ref 4.6–6.2)
RBC #/AREA URNS HPF: >100 /HPF (ref 0–4)
SAMPLE: NORMAL
SITE: NORMAL
SODIUM SERPL-SCNC: 151 MMOL/L (ref 136–145)
SP GR UR STRIP: >=1.03 (ref 1–1.03)
SPECIMEN SOURCE: NORMAL
TROPONIN I SERPL DL<=0.01 NG/ML-MCNC: 0.03 NG/ML (ref 0–0.03)
URN SPEC COLLECT METH UR: ABNORMAL
UROBILINOGEN UR STRIP-ACNC: NEGATIVE EU/DL
WBC # BLD AUTO: 9.5 K/UL (ref 3.9–12.7)
WBC #/AREA URNS HPF: 5 /HPF (ref 0–5)

## 2019-11-10 PROCEDURE — 63600175 PHARM REV CODE 636 W HCPCS: Performed by: EMERGENCY MEDICINE

## 2019-11-10 PROCEDURE — G0378 HOSPITAL OBSERVATION PER HR: HCPCS

## 2019-11-10 PROCEDURE — 36415 COLL VENOUS BLD VENIPUNCTURE: CPT

## 2019-11-10 PROCEDURE — 87040 BLOOD CULTURE FOR BACTERIA: CPT

## 2019-11-10 PROCEDURE — 84100 ASSAY OF PHOSPHORUS: CPT

## 2019-11-10 PROCEDURE — 25000003 PHARM REV CODE 250: Performed by: NURSE PRACTITIONER

## 2019-11-10 PROCEDURE — 85018 HEMOGLOBIN: CPT

## 2019-11-10 PROCEDURE — 83735 ASSAY OF MAGNESIUM: CPT

## 2019-11-10 PROCEDURE — 93005 ELECTROCARDIOGRAM TRACING: CPT

## 2019-11-10 PROCEDURE — 25000242 PHARM REV CODE 250 ALT 637 W/ HCPCS: Performed by: EMERGENCY MEDICINE

## 2019-11-10 PROCEDURE — 85025 COMPLETE CBC W/AUTO DIFF WBC: CPT

## 2019-11-10 PROCEDURE — 82803 BLOOD GASES ANY COMBINATION: CPT

## 2019-11-10 PROCEDURE — 99900035 HC TECH TIME PER 15 MIN (STAT)

## 2019-11-10 PROCEDURE — 92524 BEHAVRAL QUALIT ANALYS VOICE: CPT

## 2019-11-10 PROCEDURE — 83605 ASSAY OF LACTIC ACID: CPT | Mod: 91

## 2019-11-10 PROCEDURE — 84484 ASSAY OF TROPONIN QUANT: CPT

## 2019-11-10 PROCEDURE — 85610 PROTHROMBIN TIME: CPT

## 2019-11-10 PROCEDURE — 96361 HYDRATE IV INFUSION ADD-ON: CPT

## 2019-11-10 PROCEDURE — 82550 ASSAY OF CK (CPK): CPT

## 2019-11-10 PROCEDURE — 96365 THER/PROPH/DIAG IV INF INIT: CPT

## 2019-11-10 PROCEDURE — 63600175 PHARM REV CODE 636 W HCPCS: Performed by: NURSE PRACTITIONER

## 2019-11-10 PROCEDURE — 63600175 PHARM REV CODE 636 W HCPCS: Performed by: INTERNAL MEDICINE

## 2019-11-10 PROCEDURE — 80053 COMPREHEN METABOLIC PANEL: CPT

## 2019-11-10 PROCEDURE — 85014 HEMATOCRIT: CPT

## 2019-11-10 PROCEDURE — 99291 CRITICAL CARE FIRST HOUR: CPT | Mod: 25

## 2019-11-10 PROCEDURE — 94640 AIRWAY INHALATION TREATMENT: CPT

## 2019-11-10 PROCEDURE — 84145 PROCALCITONIN (PCT): CPT

## 2019-11-10 PROCEDURE — 93010 EKG 12-LEAD: ICD-10-PCS | Mod: ,,, | Performed by: INTERNAL MEDICINE

## 2019-11-10 PROCEDURE — 93010 ELECTROCARDIOGRAM REPORT: CPT | Mod: ,,, | Performed by: INTERNAL MEDICINE

## 2019-11-10 PROCEDURE — 36600 WITHDRAWAL OF ARTERIAL BLOOD: CPT

## 2019-11-10 PROCEDURE — 81000 URINALYSIS NONAUTO W/SCOPE: CPT

## 2019-11-10 PROCEDURE — 87086 URINE CULTURE/COLONY COUNT: CPT

## 2019-11-10 PROCEDURE — 87502 INFLUENZA DNA AMP PROBE: CPT

## 2019-11-10 RX ORDER — IPRATROPIUM BROMIDE AND ALBUTEROL SULFATE 2.5; .5 MG/3ML; MG/3ML
3 SOLUTION RESPIRATORY (INHALATION) EVERY 4 HOURS PRN
Status: DISCONTINUED | OUTPATIENT
Start: 2019-11-10 | End: 2019-11-17 | Stop reason: HOSPADM

## 2019-11-10 RX ORDER — OXYMETAZOLINE HCL 0.05 %
2 SPRAY, NON-AEROSOL (ML) NASAL 2 TIMES DAILY
Status: DISPENSED | OUTPATIENT
Start: 2019-11-10 | End: 2019-11-13

## 2019-11-10 RX ORDER — WARFARIN 1 MG/1
1 TABLET ORAL DAILY
Status: DISCONTINUED | OUTPATIENT
Start: 2019-11-13 | End: 2019-11-12

## 2019-11-10 RX ORDER — LISINOPRIL 20 MG/1
20 TABLET ORAL DAILY
Status: DISCONTINUED | OUTPATIENT
Start: 2019-11-11 | End: 2019-11-11

## 2019-11-10 RX ORDER — CARVEDILOL 6.25 MG/1
6.25 TABLET ORAL 2 TIMES DAILY WITH MEALS
Status: DISCONTINUED | OUTPATIENT
Start: 2019-11-10 | End: 2019-11-11

## 2019-11-10 RX ORDER — IPRATROPIUM BROMIDE AND ALBUTEROL SULFATE 2.5; .5 MG/3ML; MG/3ML
3 SOLUTION RESPIRATORY (INHALATION)
Status: COMPLETED | OUTPATIENT
Start: 2019-11-10 | End: 2019-11-10

## 2019-11-10 RX ORDER — FAMOTIDINE 20 MG/1
20 TABLET, FILM COATED ORAL DAILY
Status: DISCONTINUED | OUTPATIENT
Start: 2019-11-11 | End: 2019-11-11

## 2019-11-10 RX ORDER — CARBIDOPA AND LEVODOPA 25; 100 MG/1; MG/1
2 TABLET ORAL 3 TIMES DAILY
Status: DISCONTINUED | OUTPATIENT
Start: 2019-11-10 | End: 2019-11-11

## 2019-11-10 RX ADMIN — PIPERACILLIN SODIUM AND TAZOBACTAM SODIUM 4.5 G: 4; .5 INJECTION, POWDER, LYOPHILIZED, FOR SOLUTION INTRAVENOUS at 09:11

## 2019-11-10 RX ADMIN — CARVEDILOL 6.25 MG: 6.25 TABLET, FILM COATED ORAL at 05:11

## 2019-11-10 RX ADMIN — CEFTRIAXONE 1 G: 1 INJECTION, SOLUTION INTRAVENOUS at 12:11

## 2019-11-10 RX ADMIN — IPRATROPIUM BROMIDE AND ALBUTEROL SULFATE 3 ML: .5; 3 SOLUTION RESPIRATORY (INHALATION) at 08:11

## 2019-11-10 RX ADMIN — Medication 10 MG: at 11:11

## 2019-11-10 RX ADMIN — SODIUM CHLORIDE 500 ML: 0.9 INJECTION, SOLUTION INTRAVENOUS at 08:11

## 2019-11-10 RX ADMIN — Medication 2 SPRAY: at 09:11

## 2019-11-10 NOTE — HPI
Shaq Huffman is a 86 y.o. male patient with a h/o Parkinson's dz, COPD, and Atrial fibrillation who presents to the Emergency Department for evaluation of worsening confusion which onset gradually several days ago. Symptoms are constant and moderate in severity. No mitigating or exacerbating factors reported. Associated sxs include generalized weakness, cough (productive), decreased appetite, malaise, and dark urine. Patient denies any fever, chills, abd pain, SOB, CP, frequency/urgency, and all other sxs at this time. No prior Tx reported. No further complaints or concerns at this time. Pt was hospitalized at [facility] from 10/29/19-11/7/19 and treated for acute respiratory failure and aspiration pneumonia.  Pt failed MBSS during recent admission and refused PEG placement at that time.  Pt was discharged to [facility] on Augmentin. Pt is a full code and Shabbir Huffman (son) at [phone] and Alina Atwood (significant other) at [phone] are the surrogate decision makers.  ER work up showed: Na 151, Cl 117, BUN 32, Alk Phos 181, Bili 1.7, and Albumin 2.5.   Chest xray showed increasing patchy density at the right base suspicious for pneumonia with stable consolidation medial right base.  CT of head showed no acute intracranial abnormality with atrophy present and old lacunar insult suspected on the right.  UA positive for infectious process.  Hospital Medicine contacted for admission to Observation Unit.

## 2019-11-10 NOTE — ED PROVIDER NOTES
SCRIBE #1 NOTE: I, Esteban White, am scribing for, and in the presence of, Neftaly Crain Jr., MD. I have scribed the entire note.       History     Chief Complaint   Patient presents with    Cough     pt sent from [place] with chest congestion and cough, dark urine.  pt was transferred to [place] after being on vent at [facility]     Review of patient's allergies indicates:  No Known Allergies      History of Present Illness     HPI    11/10/2019, 7:39 AM  History obtained from the patient      History of Present Illness: Shaq Huffman is a 86 y.o. male patient with a h/o Parkinson's dz who presents to the Emergency Department for evaluation of worsening confusion which onset gradually several days ago. Symptoms are constant and moderate in severity. No mitigating or exacerbating factors reported. Associated sxs include generalized weakness, cough, decreased appetite, and hematuria. Patient denies any fever, chills, abd pain, SOB, CP, frequency/urgency, and all other sxs at this time. No prior Tx reported. No further complaints or concerns at this time. Pt was discharged from this facility 2 days ago after Tx for aspiration pneumonia.      Arrival mode: Ambulatory service    PCP: [facility]        Past Medical History:  Past Medical History:   Diagnosis Date    A-fib     COPD (chronic obstructive pulmonary disease)        Past Surgical History:  History reviewed. No pertinent surgical history.      Family History:  History reviewed. No pertinent family history.    Social History:  Social History     Tobacco Use    Smoking status: Former Smoker   Substance and Sexual Activity    Alcohol use: unknown    Drug use: unknown    Sexual activity: unknown        Review of Systems     Review of Systems   Constitutional: Positive for appetite change. Negative for chills and fever.        + generalized weakness   HENT: Negative for sore throat.    Respiratory: Positive for cough. Negative for shortness of breath.     Cardiovascular: Negative for chest pain.   Gastrointestinal: Negative for abdominal pain and nausea.   Genitourinary: Positive for hematuria. Negative for dysuria, frequency and urgency.   Musculoskeletal: Negative for back pain.   Skin: Negative for rash.   Neurological: Negative for weakness.   Hematological: Does not bruise/bleed easily.   Psychiatric/Behavioral: Positive for confusion.   All other systems reviewed and are negative.       Physical Exam     Initial Vitals   BP Pulse Resp Temp SpO2   11/10/19 0713 11/10/19 0713 11/10/19 0713 11/10/19 0747 11/10/19 0713   (!) 155/80 90 18 98.9 °F (37.2 °C) 97 %      MAP       --                 Physical Exam  Nursing Notes and Vital Signs Reviewed.  Constitutional: Frail. NAD.  Head: Atraumatic. Normocephalic.  Eyes: PERRL. EOM intact. Conjunctivae are not pale. No scleral icterus.  ENT: Mucous membranes are moist. Oropharynx is clear and symmetric.    Neck: Supple. Full ROM. No lymphadenopathy.  Cardiovascular: Regular rate. Irregularly rhythm. No murmurs, rubs, or gallops. Distal pulses are 2+ and symmetric.  Pulmonary/Chest: No respiratory distress. Clear to auscultation bilaterally. No wheezing or rales.  Abdominal: Soft and non-distended.  There is no tenderness.  No rebound, guarding, or rigidity. Good bowel sounds.  Genitourinary: No CVA tenderness  Musculoskeletal: Moves all extremities. No obvious deformities. No calf tenderness.  Skin: Warm and dry.  Neurological:  Alert, awake, and appropriate.  Normal speech.  No acute focal neurological deficits are appreciated.  Psychiatric: Normal affect. Good eye contact. Appropriate in content.     ED Course   Critical Care  Date/Time: 11/10/2019 3:49 PM  Performed by: Neftaly Crain Jr., MD  Authorized by: Neftaly Crain Jr., MD   Direct patient critical care time: 13 minutes  Additional history critical care time: 13 minutes  Ordering / reviewing critical care time: 5 minutes  Documentation critical care  time: 5 minutes  Total critical care time (exclusive of procedural time) : 36 minutes  Critical care time was exclusive of separately billable procedures and treating other patients and teaching time.  Critical care was necessary to treat or prevent imminent or life-threatening deterioration of the following conditions: pneumonia, AMS, acute lower UTI.  Critical care was time spent personally by me on the following activities: blood draw for specimens, development of treatment plan with patient or surrogate, interpretation of cardiac output measurements, evaluation of patient's response to treatment, examination of patient, obtaining history from patient or surrogate, ordering and performing treatments and interventions, ordering and review of laboratory studies, ordering and review of radiographic studies, pulse oximetry, re-evaluation of patient's condition and review of old charts.        ED Vital Signs:  Vitals:    11/10/19 0713 11/10/19 0745 11/10/19 0746 11/10/19 0747   BP: (!) 155/80      Pulse: 90  93    Resp: 18      Temp:    98.9 °F (37.2 °C)   TempSrc:    Axillary   SpO2: 97%      Weight:  68.8 kg (151 lb 10.8 oz)      11/10/19 0800 11/10/19 0817 11/10/19 0823 11/10/19 0827   BP: (!) 149/88      Pulse: 90 90 85 94   Resp: 20 16 20 (!) 22   Temp:       TempSrc:       SpO2: 97% 99% 97% 97%   Weight:        11/10/19 0830 11/10/19 0900 11/10/19 0930 11/10/19 1103   BP: (!) 148/86 139/69 133/81 (!) 161/110   Pulse: 91 92 82 88   Resp: 17 20 18 20   Temp:    99.2 °F (37.3 °C)   TempSrc:    Axillary   SpO2: 97% 96% 95% 97%   Weight:        11/10/19 1104 11/10/19 1225   BP: (!) 159/96 (!) 148/87   Pulse: 85 77   Resp:  18   Temp:  97.9 °F (36.6 °C)   TempSrc:  Oral   SpO2: (!) 94% (!) 92%   Weight:         Abnormal Lab Results:  Labs Reviewed   CBC W/ AUTO DIFFERENTIAL - Abnormal; Notable for the following components:       Result Value    RBC 4.16 (*)     Hemoglobin 13.6 (*)     Mean Corpuscular Volume 106 (*)      Mean Corpuscular Hemoglobin 32.7 (*)     Mean Corpuscular Hemoglobin Conc 30.9 (*)     Gran # (ANC) 7.8 (*)     Lymph # 0.7 (*)     Gran% 82.3 (*)     Lymph% 7.7 (*)     All other components within normal limits   COMPREHENSIVE METABOLIC PANEL - Abnormal; Notable for the following components:    Sodium 151 (*)     Chloride 117 (*)     BUN, Bld 32 (*)     Albumin 2.5 (*)     Total Bilirubin 1.7 (*)     Alkaline Phosphatase 181 (*)     ALT <5 (*)     All other components within normal limits   URINALYSIS, REFLEX TO URINE CULTURE - Abnormal; Notable for the following components:    Specific Gravity, UA >=1.030 (*)     Protein, UA 2+ (*)     Ketones, UA Trace (*)     Bilirubin (UA) 1+ (*)     Occult Blood UA 3+ (*)     Nitrite, UA Positive (*)     All other components within normal limits    Narrative:     Preferred Collection Type->Urine, Clean Catch   URINALYSIS MICROSCOPIC - Abnormal; Notable for the following components:    RBC, UA >100 (*)     All other components within normal limits    Narrative:     Preferred Collection Type->Urine, Clean Catch   INFLUENZA A & B BY MOLECULAR   CULTURE, BLOOD   CULTURE, BLOOD   LACTIC ACID, PLASMA   TROPONIN I   PROCALCITONIN   CK        All Lab Results:  Results for orders placed or performed during the hospital encounter of 11/10/19   Influenza A & B by Molecular   Result Value Ref Range    Influenza A, Molecular Negative Negative    Influenza B, Molecular Negative Negative    Flu A & B Source Nasal swab    CBC auto differential   Result Value Ref Range    WBC 9.50 3.90 - 12.70 K/uL    RBC 4.16 (L) 4.60 - 6.20 M/uL    Hemoglobin 13.6 (L) 14.0 - 18.0 g/dL    Hematocrit 44.0 40.0 - 54.0 %    Mean Corpuscular Volume 106 (H) 82 - 98 fL    Mean Corpuscular Hemoglobin 32.7 (H) 27.0 - 31.0 pg    Mean Corpuscular Hemoglobin Conc 30.9 (L) 32.0 - 36.0 g/dL    RDW 14.5 11.5 - 14.5 %    Platelets 189 150 - 350 K/uL    MPV 10.1 9.2 - 12.9 fL    Immature Granulocytes 0.4 0.0 - 0.5 %     Gran # (ANC) 7.8 (H) 1.8 - 7.7 K/uL    Immature Grans (Abs) 0.04 0.00 - 0.04 K/uL    Lymph # 0.7 (L) 1.0 - 4.8 K/uL    Mono # 0.7 0.3 - 1.0 K/uL    Eos # 0.1 0.0 - 0.5 K/uL    Baso # 0.04 0.00 - 0.20 K/uL    nRBC 0 0 /100 WBC    Gran% 82.3 (H) 38.0 - 73.0 %    Lymph% 7.7 (L) 18.0 - 48.0 %    Mono% 7.7 4.0 - 15.0 %    Eosinophil% 1.5 0.0 - 8.0 %    Basophil% 0.4 0.0 - 1.9 %    Differential Method Automated    Comprehensive metabolic panel   Result Value Ref Range    Sodium 151 (H) 136 - 145 mmol/L    Potassium 4.2 3.5 - 5.1 mmol/L    Chloride 117 (H) 95 - 110 mmol/L    CO2 26 23 - 29 mmol/L    Glucose 82 70 - 110 mg/dL    BUN, Bld 32 (H) 8 - 23 mg/dL    Creatinine 0.9 0.5 - 1.4 mg/dL    Calcium 9.0 8.7 - 10.5 mg/dL    Total Protein 6.4 6.0 - 8.4 g/dL    Albumin 2.5 (L) 3.5 - 5.2 g/dL    Total Bilirubin 1.7 (H) 0.1 - 1.0 mg/dL    Alkaline Phosphatase 181 (H) 55 - 135 U/L    AST 26 10 - 40 U/L    ALT <5 (L) 10 - 44 U/L    Anion Gap 8 8 - 16 mmol/L    eGFR if African American >60 >60 mL/min/1.73 m^2    eGFR if non African American >60 >60 mL/min/1.73 m^2   Lactic acid, plasma #1   Result Value Ref Range    Lactate (Lactic Acid) 1.1 0.5 - 2.2 mmol/L   Urinalysis, Reflex to Urine Culture Urine, Clean Catch   Result Value Ref Range    Specimen UA Urine, Catheterized     Color, UA Yellow Yellow, Straw, Meghann    Appearance, UA Clear Clear    pH, UA 5.0 5.0 - 8.0    Specific Gravity, UA >=1.030 (A) 1.005 - 1.030    Protein, UA 2+ (A) Negative    Glucose, UA Negative Negative    Ketones, UA Trace (A) Negative    Bilirubin (UA) 1+ (A) Negative    Occult Blood UA 3+ (A) Negative    Nitrite, UA Positive (A) Negative    Urobilinogen, UA Negative <2.0 EU/dL    Leukocytes, UA Negative Negative   Troponin I   Result Value Ref Range    Troponin I 0.025 0.000 - 0.026 ng/mL   Procalcitonin   Result Value Ref Range    Procalcitonin 0.21 <0.25 ng/mL   CPK   Result Value Ref Range    CPK 34 20 - 200 U/L   Urinalysis Microscopic   Result  Value Ref Range    RBC, UA >100 (H) 0 - 4 /hpf    WBC, UA 5 0 - 5 /hpf    Bacteria Occasional None-Occ /hpf    Hyaline Casts, UA 1 0-1/lpf /lpf    Amorphous, UA Few None-Moderate    Microscopic Comment SEE COMMENT          Imaging Results:  Imaging Results          CT Head Without Contrast (Final result)  Result time 11/10/19 08:55:34    Final result by Giulia Suazo MD (Timothy) (11/10/19 08:55:34)                 Impression:      No acute intracranial abnormality.  Atrophy is present.  Old lacunar insult suspected on the right.    All CT scans at this facility use dose modulation, iterative reconstructions, and/or weight base dosing when appropriate to reduce radiation dose to as low as reasonably achievable.      Electronically signed by: Giulia Suazo MD  Date:    11/10/2019  Time:    08:55             Narrative:    EXAMINATION:  CT HEAD WITHOUT CONTRAST    CLINICAL HISTORY:  Confusion/delirium, altered LOC, unexplained;    COMPARISON:  None    FINDINGS:  No intracranial acute hemorrhage or acute focal brain parenchymal abnormality is identified.  Atrophy is present.  Small old lacunar insult suspected in the anterior limb of internal capsule on the right.  Calvarium is intact.                               X-Ray Chest AP Portable (Final result)  Result time 11/10/19 08:19:14    Final result by Giulia Suazo MD (Timothy) (11/10/19 08:19:14)                 Impression:      Increasing patchy density at the right base suspicious for pneumonia.  Stable consolidation medial right base.  Follow-up is recommended      Electronically signed by: Giulia Suazo MD  Date:    11/10/2019  Time:    08:19             Narrative:    EXAMINATION:  XR CHEST AP PORTABLE    CLINICAL HISTORY:  <Diagnosis>, Sepsis;    COMPARISON:  11/06/2019    FINDINGS:  Stable heart size.  Left arm PICC catheter is in stable position.  There is focal consolidation at medial right lung base.  There is increasing patchy infiltrate developing  at the right base as well.  Findings suspicious for pneumonia.                                 The EKG was ordered, reviewed, and independently interpreted by the ED provider.  Interpretation time: 0743  Rate: 95 BPM  Rhythm: atrial fibrillation  Interpretation: Incomplete RBBB. No STEMI.      The Emergency Provider reviewed the vital signs and test results, which are outlined above.     ED Discussion        9:21 AM: Discussed case with Rhoda Peterson NP ([facility]). Dr. Franco agrees with current care and management of pt and accepts admission.   Admitting Service: Hospital medicine  Admitting Physician: Dr Franco  Admit to: obs/tele    Re-evaluated pt. I have discussed test results, shared treatment plan, and the need for admission with patient and family at bedside. Pt and family express understanding at this time and agree with all information. All questions answered. Pt and family have no further questions or concerns at this time. Pt is ready for admit.    9:28 AM  Patient has worsening right lower lobe pneumonia with altered mental status and prolonged urinary catheter with nitrite positive urine.  This is a patient is worsening, I will admit for IV antibiotics and further treatment.  [facility] has graciously accepted.     MDM        Medical Decision Making:   Clinical Tests:   Lab Tests: Ordered and Reviewed  Radiological Study: Reviewed and Ordered  Medical Tests: Reviewed and Ordered           ED Medication(s):  Medications   cefTRIAXone (ROCEPHIN) 1 g in dextrose 5 % 50 mL IVPB (1 g Intravenous New Bag 11/10/19 1233)   warfarin (COUMADIN) tablet 1 mg PLACEHOLDER DOSE ONLY (has no administration in time range)   sodium chloride 0.9% bolus 500 mL (0 mLs Intravenous Stopped 11/10/19 1024)   albuterol-ipratropium 2.5 mg-0.5 mg/3 mL nebulizer solution 3 mL (3 mLs Nebulization Given 11/10/19 0677)   piperacillin-tazobactam 4.5 g in dextrose 5 % 100 mL IVPB (ready to mix system) (0 g Intravenous  Stopped 11/10/19 0949)       Current Discharge Medication List                  Scribe Attestation:   Scribe #1: I performed the above scribed service and the documentation accurately describes the services I performed. I attest to the accuracy of the note.     Attending:   Physician Attestation Statement for Scribe #1: I, Neftaly Crain Jr., MD, personally performed the services described in this documentation, as scribed by Esteban White, in my presence, and it is both accurate and complete.           Clinical Impression       ICD-10-CM ICD-9-CM   1. Pneumonia of right lower lobe due to infectious organism J18.1 486   2. Altered mental status R41.82 780.97   3. Acute lower UTI N39.0 599.0   4. Altered mental status, unspecified altered mental status type R41.82 780.97       Disposition:   Disposition: Placed in Observation  Condition: Fair         Neftaly Crain Jr., MD  11/10/19 0929       Neftaly Crain Jr., MD  11/10/19 2717

## 2019-11-10 NOTE — SUBJECTIVE & OBJECTIVE
Past Medical History:   Diagnosis Date    A-fib     COPD (chronic obstructive pulmonary disease)        History reviewed. No pertinent surgical history.    Review of patient's allergies indicates:  No Known Allergies    No current facility-administered medications on file prior to encounter.      Current Outpatient Medications on File Prior to Encounter   Medication Sig    albuterol (PROVENTIL) 2.5 mg /3 mL (0.083 %) nebulizer solution Take 2.5 mg by nebulization every 6 (six) hours as needed for Wheezing. Rescue    brimonidine 0.15 % OPTH DROP (ALPHAGAN) 0.15 % ophthalmic solution 1 drop 3 (three) times daily.    brinzolamide/brimonidine tart (SIMBRINZA OPHT) Apply to eye.    carbidopa-levodopa  mg (SINEMET)  mg per tablet Take 2 tablets by mouth 3 (three) times daily.    carvedilol (COREG) 6.25 MG tablet Take 1 tablet (6.25 mg total) by mouth 2 (two) times daily.    famotidine (PEPCID) 20 MG tablet Take 1 tablet (20 mg total) by mouth once daily.    fluticasone propionate (FLOVENT HFA) 110 mcg/actuation inhaler Inhale 1 puff into the lungs 2 (two) times daily. Controller    furosemide (LASIX) 20 MG tablet Take 1 tablet (20 mg total) by mouth daily as needed (SOB).    guaiFENesin (MUCINEX) 600 mg 12 hr tablet Take 1 tablet (600 mg total) by mouth 2 (two) times daily.    lisinopril (PRINIVIL,ZESTRIL) 20 MG tablet Take 1 tablet (20 mg total) by mouth once daily.    polyethylene glycol (GLYCOLAX) 17 gram PwPk Take 17 g by mouth once daily.    amoxicillin-clavulanate 875-125mg (AUGMENTIN) 875-125 mg per tablet Take 1 tablet by mouth every 12 (twelve) hours.    vit C/vit E ac/lut/copper/zinc (PRESERVISION LUTEIN ORAL) Take by mouth.    vitamin D (VITAMIN D3) 1000 units Tab Take 1,000 Units by mouth once daily.    warfarin (COUMADIN) 2.5 MG tablet Take 1 tablet (2.5 mg total) by mouth Daily.     Family History     None        Tobacco Use    Smoking status: Former Smoker   Substance and  Sexual Activity    Alcohol use: Not on file    Drug use: Not on file    Sexual activity: Not on file     Review of Systems   Constitutional: Positive for activity change, appetite change, fatigue and unexpected weight change. Negative for chills, diaphoresis and fever.   HENT: Positive for congestion and trouble swallowing. Negative for postnasal drip and sore throat.    Respiratory: Positive for cough (productive). Negative for shortness of breath and wheezing.    Cardiovascular: Negative for chest pain, palpitations and leg swelling.   Gastrointestinal: Negative for abdominal distention, abdominal pain, diarrhea, nausea and vomiting.   Endocrine: Negative for cold intolerance and heat intolerance.   Genitourinary: Positive for hematuria. Negative for difficulty urinating, dysuria, flank pain, frequency and urgency.   Musculoskeletal: Negative for arthralgias, back pain, myalgias and neck pain.   Skin: Negative for color change and wound.   Allergic/Immunologic: Negative for environmental allergies and food allergies.   Neurological: Positive for weakness. Negative for dizziness, syncope, speech difficulty, light-headedness and headaches.   Hematological: Does not bruise/bleed easily.   Psychiatric/Behavioral: Positive for confusion. Negative for agitation and sleep disturbance. The patient is not nervous/anxious.      Objective:     Vital Signs (Most Recent):  Temp: 97.9 °F (36.6 °C) (11/10/19 1225)  Pulse: 77 (11/10/19 1225)  Resp: 18 (11/10/19 1225)  BP: (!) 148/87 (11/10/19 1225)  SpO2: (!) 92 % (11/10/19 1225) Vital Signs (24h Range):  Temp:  [97.9 °F (36.6 °C)-99.2 °F (37.3 °C)] 97.9 °F (36.6 °C)  Pulse:  [77-94] 77  Resp:  [16-22] 18  SpO2:  [92 %-99 %] 92 %  BP: (133-161)/() 148/87     Weight: 68.8 kg (151 lb 10.8 oz)  Body mass index is 18.96 kg/m².    Physical Exam   Constitutional: He appears cachectic. He appears toxic. He appears ill. Nasal cannula in place.   HENT:   Head: Normocephalic.    Nose: Nose normal.   Mouth/Throat: Mucous membranes are dry. He has dentures.   Eyes: Conjunctivae are normal.   Neck: Normal range of motion. Neck supple.   Cardiovascular: Normal rate and intact distal pulses. An irregularly irregular rhythm present.   No murmur heard.  Pulmonary/Chest: Effort normal. No stridor. No respiratory distress. He has decreased breath sounds in the right middle field and the right lower field. He has no wheezes. He has rhonchi in the left lower field. He has no rales.   Abdominal: Soft. Bowel sounds are normal. He exhibits no distension. There is no tenderness.   Genitourinary:   Genitourinary Comments: [place] catheter present    Musculoskeletal:   Generalized weakness    Neurological: He is alert.   Pt oriented to person and place.  Pt reoriented to time and situation.    Skin: Skin is warm and dry.   Skin is thin with skin tears present to BUE   Psychiatric: He has a normal mood and affect.           Significant Labs:   CBC:   Recent Labs   Lab 11/10/19  0748   WBC 9.50   HGB 13.6*   HCT 44.0        CMP:   Recent Labs   Lab 11/10/19  0748   *   K 4.2   *   CO2 26   GLU 82   BUN 32*   CREATININE 0.9   CALCIUM 9.0   PROT 6.4   ALBUMIN 2.5*   BILITOT 1.7*   ALKPHOS 181*   AST 26   ALT <5*   ANIONGAP 8   EGFRNONAA >60     Lactic Acid:   Recent Labs   Lab 11/10/19  0748   LACTATE 1.1     Troponin:   Recent Labs   Lab 11/10/19  0748   TROPONINI 0.025       Significant Imaging:   Imaging Results          CT Head Without Contrast (Final result)  Result time 11/10/19 08:55:34    Final result by Giulia Suazo MD (Timothy) (11/10/19 08:55:34)                 Impression:      No acute intracranial abnormality.  Atrophy is present.  Old lacunar insult suspected on the right.    All CT scans at this facility use dose modulation, iterative reconstructions, and/or weight base dosing when appropriate to reduce radiation dose to as low as reasonably achievable.      Electronically  signed by: Giulia Suazo MD  Date:    11/10/2019  Time:    08:55             Narrative:    EXAMINATION:  CT HEAD WITHOUT CONTRAST    CLINICAL HISTORY:  Confusion/delirium, altered LOC, unexplained;    COMPARISON:  None    FINDINGS:  No intracranial acute hemorrhage or acute focal brain parenchymal abnormality is identified.  Atrophy is present.  Small old lacunar insult suspected in the anterior limb of internal capsule on the right.  Calvarium is intact.                               X-Ray Chest AP Portable (Final result)  Result time 11/10/19 08:19:14    Final result by Giulia Suazo MD (Timothy) (11/10/19 08:19:14)                 Impression:      Increasing patchy density at the right base suspicious for pneumonia.  Stable consolidation medial right base.  Follow-up is recommended      Electronically signed by: Giulia Suazo MD  Date:    11/10/2019  Time:    08:19             Narrative:    EXAMINATION:  XR CHEST AP PORTABLE    CLINICAL HISTORY:  <Diagnosis>, Sepsis;    COMPARISON:  11/06/2019    FINDINGS:  Stable heart size.  Left arm PICC catheter is in stable position.  There is focal consolidation at medial right lung base.  There is increasing patchy infiltrate developing at the right base as well.  Findings suspicious for pneumonia.

## 2019-11-10 NOTE — H&P
[facility] Hospital Medicine  History & Physical    Patient Name: Shaq Huffman  MRN: [id number]  Admission Date: 11/10/2019  Attending Physician: Josesito Franco MD   Primary Care Provider: Provider [employer]         Patient information was obtained from patient, relative(s), past medical records and ER records.     Subjective:     Principal Problem: Urinary Tract Infection     Chief Complaint:   Chief Complaint   Patient presents with    Cough     pt sent from [place] with chest congestion and cough, dark urine.  pt was transferred to [place] after being on vent at [facility]        HPI:  Shaq Huffman is a 86 y.o. male patient with a h/o Parkinson's dz, COPD, and Atrial fibrillation who presents to the Emergency Department for evaluation of worsening confusion which onset gradually several days ago. Symptoms are constant and moderate in severity. No mitigating or exacerbating factors reported. Associated sxs include generalized weakness, cough  (productive), decreased appetite, malaise, and dark urine. Patient denies any fever, chills, abd pain, SOB, CP, frequency/urgency, and all other sxs at this time. No prior Tx reported. No further complaints or concerns at this time. Pt was hospitalized at [facility] from 10/29/19-11/7/19 and treated for acute respiratory failure and aspiration pneumonia.  Pt failed MBSS during recent admission and refused PEG placement at that time.  Pt was discharged to [facility] on Augmentin. Pt is a full code and Shabbir Huffman (son) at [phone] and Alina Atwood (significant other) at [phone] are the surrogate decision makers.  ER work up showed: Na 151, Cl 117, BUN 32, Alk Phos 181, Bili 1.7, and Albumin 2.5.   Chest xray showed increasing patchy density at the right base suspicious for pneumonia with stable consolidation medial right base.  CT of head showed no acute intracranial abnormality with atrophy present and old lacunar insult suspected on the right.   UA positive for infectious process.  Hospital Medicine contacted for admission to Observation Unit.     Past Medical History:   Diagnosis Date    A-fib     COPD (chronic obstructive pulmonary disease)        History reviewed. No pertinent surgical history.    Review of patient's allergies indicates:  No Known Allergies    No current facility-administered medications on file prior to encounter.      Current Outpatient Medications on File Prior to Encounter   Medication Sig    albuterol (PROVENTIL) 2.5 mg /3 mL (0.083 %) nebulizer solution Take 2.5 mg by nebulization every 6 (six) hours as needed for Wheezing. Rescue    brimonidine 0.15 % OPTH DROP (ALPHAGAN) 0.15 % ophthalmic solution 1 drop 3 (three) times daily.    brinzolamide/brimonidine tart (SIMBRINZA OPHT) Apply to eye.    carbidopa-levodopa  mg (SINEMET)  mg per tablet Take 2 tablets by mouth 3 (three) times daily.    carvedilol (COREG) 6.25 MG tablet Take 1 tablet (6.25 mg total) by mouth 2 (two) times daily.    famotidine (PEPCID) 20 MG tablet Take 1 tablet (20 mg total) by mouth once daily.    fluticasone propionate (FLOVENT HFA) 110 mcg/actuation inhaler Inhale 1 puff into the lungs 2 (two) times daily. Controller    furosemide (LASIX) 20 MG tablet Take 1 tablet (20 mg total) by mouth daily as needed (SOB).    guaiFENesin (MUCINEX) 600 mg 12 hr tablet Take 1 tablet (600 mg total) by mouth 2 (two) times daily.    lisinopril (PRINIVIL,ZESTRIL) 20 MG tablet Take 1 tablet (20 mg total) by mouth once daily.    polyethylene glycol (GLYCOLAX) 17 gram PwPk Take 17 g by mouth once daily.    amoxicillin-clavulanate 875-125mg (AUGMENTIN) 875-125 mg per tablet Take 1 tablet by mouth every 12 (twelve) hours.    vit C/vit E ac/lut/copper/zinc (PRESERVISION LUTEIN ORAL) Take by mouth.    vitamin D (VITAMIN D3) 1000 units Tab Take 1,000 Units by mouth once daily.    warfarin (COUMADIN) 2.5 MG tablet Take 1 tablet (2.5 mg total) by mouth  Daily.     Family History     None        Tobacco Use    Smoking status: Former Smoker   Substance and Sexual Activity    Alcohol use: Not on file    Drug use: Not on file    Sexual activity: Not on file     Review of Systems   Constitutional: Positive for activity change, appetite change, fatigue and unexpected weight change. Negative for chills, diaphoresis and fever.   HENT: Positive for congestion and trouble swallowing. Negative for postnasal drip and sore throat.    Respiratory: Positive for cough (productive). Negative for shortness of breath and wheezing.    Cardiovascular: Negative for chest pain, palpitations and leg swelling.   Gastrointestinal: Negative for abdominal distention, abdominal pain, diarrhea, nausea and vomiting.   Endocrine: Negative for cold intolerance and heat intolerance.   Genitourinary: Positive for hematuria. Negative for difficulty urinating, dysuria, flank pain, frequency and urgency.   Musculoskeletal: Negative for arthralgias, back pain, myalgias and neck pain.   Skin: Negative for color change and wound.   Allergic/Immunologic: Negative for environmental allergies and food allergies.   Neurological: Positive for weakness. Negative for dizziness, syncope, speech difficulty, light-headedness and headaches.   Hematological: Does not bruise/bleed easily.   Psychiatric/Behavioral: Positive for confusion. Negative for agitation and sleep disturbance. The patient is not nervous/anxious.      Objective:     Vital Signs (Most Recent):  Temp: 97.9 °F (36.6 °C) (11/10/19 1225)  Pulse: 77 (11/10/19 1225)  Resp: 18 (11/10/19 1225)  BP: (!) 148/87 (11/10/19 1225)  SpO2: (!) 92 % (11/10/19 1225) Vital Signs (24h Range):  Temp:  [97.9 °F (36.6 °C)-99.2 °F (37.3 °C)] 97.9 °F (36.6 °C)  Pulse:  [77-94] 77  Resp:  [16-22] 18  SpO2:  [92 %-99 %] 92 %  BP: (133-161)/() 148/87     Weight: 68.8 kg (151 lb 10.8 oz)  Body mass index is 18.96 kg/m².    Physical Exam   Constitutional: He appears  cachectic. He appears toxic. He appears ill. Nasal cannula in place.   HENT:   Head: Normocephalic.   Nose: Nose normal.   Mouth/Throat: Mucous membranes are dry. He has dentures.   Eyes: Conjunctivae are normal.   Neck: Normal range of motion. Neck supple.   Cardiovascular: Normal rate and intact distal pulses. An irregularly irregular rhythm present.   No murmur heard.  Pulmonary/Chest: Effort normal. No stridor. No respiratory distress. He has decreased breath sounds in the right middle field and the right lower field. He has no wheezes. He has rhonchi in the left lower field. He has no rales.   Abdominal: Soft. Bowel sounds are normal. He exhibits no distension. There is no tenderness.   Genitourinary:   Genitourinary Comments: [place] catheter present    Musculoskeletal:   Generalized weakness    Neurological: He is alert.   Pt oriented to person and place.  Pt reoriented to time and situation.    Skin: Skin is warm and dry.   Skin is thin with skin tears present to BUE   Psychiatric: He has a normal mood and affect.           Significant Labs:   CBC:   Recent Labs   Lab 11/10/19  0748   WBC 9.50   HGB 13.6*   HCT 44.0        CMP:   Recent Labs   Lab 11/10/19  0748   *   K 4.2   *   CO2 26   GLU 82   BUN 32*   CREATININE 0.9   CALCIUM 9.0   PROT 6.4   ALBUMIN 2.5*   BILITOT 1.7*   ALKPHOS 181*   AST 26   ALT <5*   ANIONGAP 8   EGFRNONAA >60     Lactic Acid:   Recent Labs   Lab 11/10/19  0748   LACTATE 1.1     Troponin:   Recent Labs   Lab 11/10/19  0748   TROPONINI 0.025       Significant Imaging:   Imaging Results          CT Head Without Contrast (Final result)  Result time 11/10/19 08:55:34    Final result by Giulia Suazo MD (Timothy) (11/10/19 08:55:34)                 Impression:      No acute intracranial abnormality.  Atrophy is present.  Old lacunar insult suspected on the right.    All CT scans at this facility use dose modulation, iterative reconstructions, and/or weight base  dosing when appropriate to reduce radiation dose to as low as reasonably achievable.      Electronically signed by: Giulia Suazo MD  Date:    11/10/2019  Time:    08:55             Narrative:    EXAMINATION:  CT HEAD WITHOUT CONTRAST    CLINICAL HISTORY:  Confusion/delirium, altered LOC, unexplained;    COMPARISON:  None    FINDINGS:  No intracranial acute hemorrhage or acute focal brain parenchymal abnormality is identified.  Atrophy is present.  Small old lacunar insult suspected in the anterior limb of internal capsule on the right.  Calvarium is intact.                               X-Ray Chest AP Portable (Final result)  Result time 11/10/19 08:19:14    Final result by Giulia Suazo MD (Timothy) (11/10/19 08:19:14)                 Impression:      Increasing patchy density at the right base suspicious for pneumonia.  Stable consolidation medial right base.  Follow-up is recommended      Electronically signed by: Giulia Suazo MD  Date:    11/10/2019  Time:    08:19             Narrative:    EXAMINATION:  XR CHEST AP PORTABLE    CLINICAL HISTORY:  <Diagnosis>, Sepsis;    COMPARISON:  11/06/2019    FINDINGS:  Stable heart size.  Left arm PICC catheter is in stable position.  There is focal consolidation at medial right lung base.  There is increasing patchy infiltrate developing at the right base as well.  Findings suspicious for pneumonia.                              Assessment/Plan:     Malnutrition of moderate degree  -increased oral intake encouraged  -pt on pureed diet with honey thick liquids         Altered mental status  -in the setting of UTI and Parkinsons dx  -CT of head showed no acute intracranial abnormality and atrophy is present with old lacunar insult suspected on the right.  -neuro checks       UTI (urinary tract infection)  -UA consistent with infectious process   -IV Rocephin   -IV hydration   -urine culture results pending         Pneumonia of right lower lobe due to infectious  organism  -pt recently treated for aspiration PNA and discharged to [facility] on Augmentin   -pt failed MBSS and refused PEG on last admission- pureed diet with honey thick liquids recommended   -IV antibiotics continued   -no leukocytosis or fever noted   -blood culture results pending   -aspiration precautions   -lactic acid 1.1       Physical deconditioning  -pt discharged to [facility] on 11/7/19   -PT/OT/ST       Parkinson disease  -Sinemet continued       Atrial fibrillation  -rate controlled   -Coumadin continued   -Coreg continued     VTE Risk Mitigation (From admission, onward)    None             Rhoda Peterson NP  Department of Hospital Medicine   [facility]

## 2019-11-10 NOTE — ASSESSMENT & PLAN NOTE
-UA consistent with infectious process   -IV Rocephin   -IV hydration   -urine culture results pending

## 2019-11-10 NOTE — ASSESSMENT & PLAN NOTE
-in the setting of UTI and Parkinsons dx  -CT of head showed no acute intracranial abnormality and atrophy is present with old lacunar insult suspected on the right.  -neuro checks

## 2019-11-10 NOTE — ED NOTES
Pt has been at [place] since his discharge from here a few days ago. Family at bedside reports that the patient had a decreased level of consciousness overnight, difficulty in clearing secretions in his throat, bloody dark urine, and and overall weakness.    Patient moved to ED room 11 patient assisted onto stretcher and changed into a gown. Patient placed on cardiac monitor, continuous pulse oximetry and automatic blood pressure cuff. Bed placed in low locked position, side rails up x 2, call light is within reach of patient or family, orientation to room and explanation of wait provided to family and patient, alarms set and turned on for monitor and pulse ox, will continue to monitor.    Patient identifies self as Shaq Huffman      LOC: The patient is awake, alert and aware of environment with an appropriate affect, the patient is oriented x 3 and speaking appropriately but weakly.  APPEARANCE: Patient resting comfortably and in no acute distress, patient is clean and well groomed, patient's clothing is properly fastened.  SKIN: The skin is cool and dry, color consistent with ethnicity, patient has normal skin turgor and dry mucus membranes, skin intact, some bruising noted on upper extremities.  MUSCULOSKELETAL: Patient moving all extremities well, no obvious swelling or deformities noted. Generalized weakness noted.  RESPIRATORY: Airway is open and patent, respirations are spontaneous, patient has a normal effort and rate, no accessory muscle use noted. Wheezing heard with expiration. Pt has a weak, wet cough.  CARDIAC: Patient has a normal rate and rhythm, no peripheral edema noted, capillary refill < 3 seconds.  ABDOMEN: Soft and non tender to palpation, no distention noted.  NEUROLOGIC: PERRL, eyes open spontaneously, behavior appropriate to situation, follows commands, facial expression symmetrical, bilateral hand grasp equal and even, purposeful motor response noted, normal sensation in all extremities when  touched with a finger.

## 2019-11-10 NOTE — PROGRESS NOTES
3 L NC , mild work of breathing using additional muscles to breath . Diminished breath sounds before breathing treatment . Expiratory wheeze after treatments . Weak loose cough

## 2019-11-10 NOTE — CONSULTS
Coumadin Consult Note    Indication: Atrial fibrillation  Goal INR: 2.0-3.0  Today's INR: 6.9 per verbal from nurse (lab downtime --> result was called to nurse from lab)  Due to supratherapeutic INR, we will HOLD Coumadin --> entered placeholder dose to tentatively start Wed 11/13    Note: pt was here last week  Per pt when counseled last week, home regimen was 5 mg every MWF & 2.5 mg all other days  However, when given a 5 mg dose by us, his INR spiked  We decreased dose to 2.5 mg daily after holding Coumadin for a couple of days  INR was 2.2 x 3 days but then jumped to 3.0 so a 1 mg dose was given on 11/6 (instead of 2.5 mg)  On 11/7, INR jumped again from 3.0 to 3.9 so Coumadin was going to be held again however [facility] was [facility] --> not sure what they were giving him at rehab since discharged    Pharmacy will monitor daily INR & restart Coumadin once INR < 2.0    Thank you for allowing us to participate in this patient's care.   Katherine McArdle, Pharm.D. 11/10/2019 3:03 PM

## 2019-11-10 NOTE — NURSING
Nose-bleeding with bright-red blood gushing from the nose. Placed on upright position, leaning forward, pinched nose and applied ice. Family at bedside saying that he had this experienced even worse like several years ago. Instructed to avoid any swallowing for now. NPKristen informed.

## 2019-11-10 NOTE — PT/OT/SLP EVAL
Speech Language Pathology Evaluation  Bedside Swallow    Patient Name:  Shaq Huffman   MRN:  [id number]  Admitting Diagnosis: <principal problem not specified>    Recommendations:                 General Recommendations:  NPO- aspiration precautions  Diet recommendations:  NPO, NPO   Aspiration Precautions: in place  General Precautions: Standard, aspiration  Communication strategies:  Verbal    History:     Past Medical History:   Diagnosis Date    A-fib     COPD (chronic obstructive pulmonary disease)        History reviewed. No pertinent surgical history.    Social History: Patient lives with his partner; [place] when he originally got sick and was admitted last month.  Son present who also lives out of state.      Prior Intubation HX:  Pt was intubated in October during  last admit but was able to successfully be extubated.    Modified Barium Swallow: Pt had a MBSS 11/6/19 with recommendations for NPO status.    Chest X-Rays: chest x-ray this admit revealed increasing patchy R lung base and suspect Pneumonia with stable consolidation at medial right lung base.    Prior diet: Reportedly on last admit pt refused PEG and at discharge was on a pureed diet with honey thick liquids.    Occupation/hobbies/homemaking: n/a    Subjective     Pt sitting up in bed coughing with wet vocal quality.  Patient goals: to feel better    Pain/Comfort:  · Pain Rating 1: 0/10    Objective:     Oral Musculature Evaluation  · Oral Musculature: general weakness  · Dentition: upper dentures, lower dentures    Bedside Swallow Eval:   Consistencies Assessed:  · 1 teaspoon of thin water and honey thick water;  Pt not given any more due to risks of aspiration.    Oral Phase:   · Minimal oral delays    Pharyngeal Phase:   · Pt with swallow initiation delay;  No coughing but effortful swallow noted.    Compensatory Strategies  · Basic strategies utilized    Treatment: Pt oriented, followed command and  answered yes/no questions appropriately    Assessment:     Shaq Huffman is a 86 y.o. male with an SLP diagnosis of Dysphagia.  Pt is a high risk for aspiration and reportedly failed MBSS on last recent admit (19) with recommendation for NPO status.   Reports indicate pt refused PEG last week and he was discharged eating a pureed consistency diet and honey thick liquids.  Pt now readmit with increasing lung issues.  Family indicate pt has been coughing on above diet.   He is recommended for NPO status with reconsideration for alternative means of nutrition.   Results/recommendations reviewed with the patient and his family who reported they understood all information presented.    Pt is recommended to continue S.T. For Dysphagia exercises to improve swallowing status.      Goals:   Multidisciplinary Problems     SLP Goals        Problem: SLP Goal    Goal Priority Disciplines Outcome   SLP Goal     SLP    Description:  LTG:  Pt will tolerate least restrictive diet consistency safely and efficiently.       ST. Oral motor tasks x 20 with cues             2. Oral Stim x20 with 100% timely laryngeal excursions             3. TBR and LE x20              4. Repeat MBSS if indicated                    Plan:     · Patient to be seen:  5 x/week   · Plan of Care expires:     · Plan of Care reviewed with:      · SLP Follow-Up:  Yes       Discharge recommendations:    continued S.T at discharge  Barriers to Discharge:      Time Tracking:     SLP Treatment Date:   11/10/19  Speech Start Time:  1510  Speech Stop Time:  1600     Speech Total Time (min):  50 min    Billable Minutes: 50 minutes    Lay Vasquez CCC-SLP  11/10/2019

## 2019-11-10 NOTE — NURSING
Received from ER per stretcher accompanied by family. Assisted to the unit bed. Vital signs taken. Assessment done with TIFFANIE Perez from ER working today. Family oriented about unit policies and protocols and reminded that patient has no ordered diet yet. No complaint made.

## 2019-11-10 NOTE — ASSESSMENT & PLAN NOTE
-pt recently treated for aspiration PNA and discharged to [facility] on Augmentin   -pt failed MBSS and refused PEG on last admission- pureed diet with honey thick liquids recommended   -IV antibiotics continued   -no leukocytosis or fever noted   -blood culture results pending   -aspiration precautions

## 2019-11-11 PROBLEM — E87.0 HYPERNATREMIA: Status: ACTIVE | Noted: 2019-11-11

## 2019-11-11 PROBLEM — R13.10 DYSPHAGIA: Status: ACTIVE | Noted: 2019-11-11

## 2019-11-11 PROBLEM — D53.9 MACROCYTIC ANEMIA: Status: ACTIVE | Noted: 2019-11-11

## 2019-11-11 PROBLEM — E86.0 DEHYDRATION: Status: ACTIVE | Noted: 2019-11-11

## 2019-11-11 LAB
ALBUMIN SERPL BCP-MCNC: 2.4 G/DL (ref 3.5–5.2)
ALP SERPL-CCNC: 172 U/L (ref 55–135)
ALT SERPL W/O P-5'-P-CCNC: 15 U/L (ref 10–44)
ANION GAP SERPL CALC-SCNC: 6 MMOL/L (ref 8–16)
ANION GAP SERPL CALC-SCNC: 9 MMOL/L (ref 8–16)
AST SERPL-CCNC: 26 U/L (ref 10–40)
BASOPHILS # BLD AUTO: 0.03 K/UL (ref 0–0.2)
BASOPHILS NFR BLD: 0.3 % (ref 0–1.9)
BILIRUB SERPL-MCNC: 1.2 MG/DL (ref 0.1–1)
BUN SERPL-MCNC: 49 MG/DL (ref 8–23)
BUN SERPL-MCNC: 50 MG/DL (ref 8–23)
CALCIUM SERPL-MCNC: 8.6 MG/DL (ref 8.7–10.5)
CALCIUM SERPL-MCNC: 8.8 MG/DL (ref 8.7–10.5)
CHLORIDE SERPL-SCNC: 117 MMOL/L (ref 95–110)
CHLORIDE SERPL-SCNC: 117 MMOL/L (ref 95–110)
CO2 SERPL-SCNC: 28 MMOL/L (ref 23–29)
CO2 SERPL-SCNC: 29 MMOL/L (ref 23–29)
CREAT SERPL-MCNC: 1.1 MG/DL (ref 0.5–1.4)
CREAT SERPL-MCNC: 1.1 MG/DL (ref 0.5–1.4)
DIFFERENTIAL METHOD: ABNORMAL
EOSINOPHIL # BLD AUTO: 0.1 K/UL (ref 0–0.5)
EOSINOPHIL NFR BLD: 1.3 % (ref 0–8)
ERYTHROCYTE [DISTWIDTH] IN BLOOD BY AUTOMATED COUNT: 14.5 % (ref 11.5–14.5)
EST. GFR  (AFRICAN AMERICAN): >60 ML/MIN/1.73 M^2
EST. GFR  (AFRICAN AMERICAN): >60 ML/MIN/1.73 M^2
EST. GFR  (NON AFRICAN AMERICAN): >60 ML/MIN/1.73 M^2
EST. GFR  (NON AFRICAN AMERICAN): >60 ML/MIN/1.73 M^2
GLUCOSE SERPL-MCNC: 132 MG/DL (ref 70–110)
GLUCOSE SERPL-MCNC: 89 MG/DL (ref 70–110)
HCT VFR BLD AUTO: 40.9 % (ref 40–54)
HCT VFR BLD AUTO: 42.1 % (ref 40–54)
HGB BLD-MCNC: 12.6 G/DL (ref 14–18)
HGB BLD-MCNC: 13 G/DL (ref 14–18)
IMM GRANULOCYTES # BLD AUTO: 0.04 K/UL (ref 0–0.04)
IMM GRANULOCYTES NFR BLD AUTO: 0.4 % (ref 0–0.5)
INR PPP: 3.9 (ref 0.8–1.2)
LYMPHOCYTES # BLD AUTO: 0.8 K/UL (ref 1–4.8)
LYMPHOCYTES NFR BLD: 9 % (ref 18–48)
MCH RBC QN AUTO: 32.9 PG (ref 27–31)
MCHC RBC AUTO-ENTMCNC: 30.8 G/DL (ref 32–36)
MCV RBC AUTO: 107 FL (ref 82–98)
MONOCYTES # BLD AUTO: 0.7 K/UL (ref 0.3–1)
MONOCYTES NFR BLD: 8 % (ref 4–15)
NEUTROPHILS # BLD AUTO: 7.3 K/UL (ref 1.8–7.7)
NEUTROPHILS NFR BLD: 81 % (ref 38–73)
NRBC BLD-RTO: 0 /100 WBC
PLATELET # BLD AUTO: 197 K/UL (ref 150–350)
PMV BLD AUTO: 10 FL (ref 9.2–12.9)
POTASSIUM SERPL-SCNC: 4 MMOL/L (ref 3.5–5.1)
POTASSIUM SERPL-SCNC: 4.4 MMOL/L (ref 3.5–5.1)
PROT SERPL-MCNC: 6.3 G/DL (ref 6–8.4)
PROTHROMBIN TIME: 40.5 SEC (ref 9–12.5)
RBC # BLD AUTO: 3.83 M/UL (ref 4.6–6.2)
SODIUM SERPL-SCNC: 151 MMOL/L (ref 136–145)
SODIUM SERPL-SCNC: 155 MMOL/L (ref 136–145)
WBC # BLD AUTO: 8.97 K/UL (ref 3.9–12.7)

## 2019-11-11 PROCEDURE — 99900035 HC TECH TIME PER 15 MIN (STAT)

## 2019-11-11 PROCEDURE — 85025 COMPLETE CBC W/AUTO DIFF WBC: CPT

## 2019-11-11 PROCEDURE — 97166 OT EVAL MOD COMPLEX 45 MIN: CPT | Performed by: PHYSICAL THERAPIST

## 2019-11-11 PROCEDURE — 27000221 HC OXYGEN, UP TO 24 HOURS

## 2019-11-11 PROCEDURE — 25000003 PHARM REV CODE 250: Performed by: NURSE PRACTITIONER

## 2019-11-11 PROCEDURE — 82607 VITAMIN B-12: CPT

## 2019-11-11 PROCEDURE — 85610 PROTHROMBIN TIME: CPT

## 2019-11-11 PROCEDURE — 94761 N-INVAS EAR/PLS OXIMETRY MLT: CPT

## 2019-11-11 PROCEDURE — 25000242 PHARM REV CODE 250 ALT 637 W/ HCPCS: Performed by: NURSE PRACTITIONER

## 2019-11-11 PROCEDURE — 63600175 PHARM REV CODE 636 W HCPCS: Performed by: NURSE PRACTITIONER

## 2019-11-11 PROCEDURE — 85014 HEMATOCRIT: CPT

## 2019-11-11 PROCEDURE — 80048 BASIC METABOLIC PNL TOTAL CA: CPT

## 2019-11-11 PROCEDURE — 25000003 PHARM REV CODE 250: Performed by: INTERNAL MEDICINE

## 2019-11-11 PROCEDURE — 97162 PT EVAL MOD COMPLEX 30 MIN: CPT

## 2019-11-11 PROCEDURE — 94640 AIRWAY INHALATION TREATMENT: CPT

## 2019-11-11 PROCEDURE — 97530 THERAPEUTIC ACTIVITIES: CPT

## 2019-11-11 PROCEDURE — 85018 HEMOGLOBIN: CPT

## 2019-11-11 PROCEDURE — 97530 THERAPEUTIC ACTIVITIES: CPT | Performed by: PHYSICAL THERAPIST

## 2019-11-11 PROCEDURE — 82746 ASSAY OF FOLIC ACID SERUM: CPT

## 2019-11-11 PROCEDURE — 99900038 HC OT GENERIC THERAPY SCREENING (STAT): Performed by: PHYSICAL THERAPIST

## 2019-11-11 PROCEDURE — 92526 ORAL FUNCTION THERAPY: CPT

## 2019-11-11 PROCEDURE — 80053 COMPREHEN METABOLIC PANEL: CPT

## 2019-11-11 PROCEDURE — 21400001 HC TELEMETRY ROOM

## 2019-11-11 RX ORDER — CYANOCOBALAMIN 1000 UG/ML
1000 INJECTION, SOLUTION INTRAMUSCULAR; SUBCUTANEOUS ONCE
Status: COMPLETED | OUTPATIENT
Start: 2019-11-11 | End: 2019-11-11

## 2019-11-11 RX ORDER — CARBIDOPA AND LEVODOPA 25; 100 MG/1; MG/1
2 TABLET ORAL 3 TIMES DAILY
Status: DISCONTINUED | OUTPATIENT
Start: 2019-11-11 | End: 2019-11-16

## 2019-11-11 RX ORDER — LISINOPRIL 20 MG/1
20 TABLET ORAL DAILY
Status: DISCONTINUED | OUTPATIENT
Start: 2019-11-12 | End: 2019-11-11

## 2019-11-11 RX ORDER — DEXTROSE MONOHYDRATE 50 MG/ML
INJECTION, SOLUTION INTRAVENOUS CONTINUOUS
Status: DISCONTINUED | OUTPATIENT
Start: 2019-11-11 | End: 2019-11-16

## 2019-11-11 RX ORDER — CARVEDILOL 6.25 MG/1
6.25 TABLET ORAL 2 TIMES DAILY WITH MEALS
Status: DISCONTINUED | OUTPATIENT
Start: 2019-11-11 | End: 2019-11-16

## 2019-11-11 RX ORDER — IPRATROPIUM BROMIDE AND ALBUTEROL SULFATE 2.5; .5 MG/3ML; MG/3ML
3 SOLUTION RESPIRATORY (INHALATION)
Status: DISCONTINUED | OUTPATIENT
Start: 2019-11-11 | End: 2019-11-16

## 2019-11-11 RX ORDER — FAMOTIDINE 20 MG/1
20 TABLET, FILM COATED ORAL DAILY
Status: DISCONTINUED | OUTPATIENT
Start: 2019-11-12 | End: 2019-11-15

## 2019-11-11 RX ORDER — BRIMONIDINE TARTRATE 1.5 MG/ML
1 SOLUTION/ DROPS OPHTHALMIC 3 TIMES DAILY
Status: DISCONTINUED | OUTPATIENT
Start: 2019-11-11 | End: 2019-11-16

## 2019-11-11 RX ADMIN — Medication 2 SPRAY: at 09:11

## 2019-11-11 RX ADMIN — CARBIDOPA AND LEVODOPA 2 TABLET: 25; 100 TABLET ORAL at 03:11

## 2019-11-11 RX ADMIN — CEFTRIAXONE 1 G: 1 INJECTION, SOLUTION INTRAVENOUS at 01:11

## 2019-11-11 RX ADMIN — DEXTROSE: 5 SOLUTION INTRAVENOUS at 10:11

## 2019-11-11 RX ADMIN — IPRATROPIUM BROMIDE AND ALBUTEROL SULFATE 3 ML: .5; 3 SOLUTION RESPIRATORY (INHALATION) at 04:11

## 2019-11-11 RX ADMIN — FAMOTIDINE 20 MG: 20 TABLET ORAL at 09:11

## 2019-11-11 RX ADMIN — CARVEDILOL 6.25 MG: 6.25 TABLET, FILM COATED ORAL at 05:11

## 2019-11-11 RX ADMIN — AZITHROMYCIN MONOHYDRATE 500 MG: 500 INJECTION, POWDER, LYOPHILIZED, FOR SOLUTION INTRAVENOUS at 03:11

## 2019-11-11 RX ADMIN — CARBIDOPA AND LEVODOPA 2 TABLET: 25; 100 TABLET ORAL at 09:11

## 2019-11-11 RX ADMIN — BRIMONIDINE TARTRATE 1 DROP: 1.5 SOLUTION OPHTHALMIC at 09:11

## 2019-11-11 RX ADMIN — Medication 2 SPRAY: at 01:11

## 2019-11-11 RX ADMIN — IPRATROPIUM BROMIDE AND ALBUTEROL SULFATE 3 ML: .5; 3 SOLUTION RESPIRATORY (INHALATION) at 07:11

## 2019-11-11 RX ADMIN — IPRATROPIUM BROMIDE AND ALBUTEROL SULFATE 3 ML: .5; 3 SOLUTION RESPIRATORY (INHALATION) at 08:11

## 2019-11-11 RX ADMIN — CYANOCOBALAMIN 1000 MCG: 1000 INJECTION, SOLUTION INTRAMUSCULAR; SUBCUTANEOUS at 04:11

## 2019-11-11 NOTE — ASSESSMENT & PLAN NOTE
- UA consistent with infectious process   - BC show NGTD  - UC pending  - Plan to DC zan in AM  - Continue IV ABX  - Use cultures to guide therapy

## 2019-11-11 NOTE — PROGRESS NOTES
[facility] Hospital Medicine  Progress Note    Patient Name: Shaq Huffman  MRN: [id number]  Patient Class: IP- Inpatient   Admission Date: 11/10/2019  Length of Stay: 0 days  Attending Physician: Josesito Franco MD  Primary Care Provider: Provider Notinsystem        Subjective:     Principal Problem:Pneumonia of right lower lobe due to infectious organism        HPI:   Shaq Huffman is a 86 y.o. male patient with a h/o Parkinson's dz, COPD, and Atrial fibrillation who presents to the Emergency Department for evaluation of worsening confusion which onset gradually several days ago. Symptoms are constant and moderate in severity. No mitigating or exacerbating factors reported. Associated sxs include generalized weakness, cough  (productive), decreased appetite, malaise, and dark urine. Patient denies any fever, chills, abd pain, SOB, CP, frequency/urgency, and all other sxs at this time. No prior Tx reported. No further complaints or concerns at this time. Pt was hospitalized at [facility] from 10/29/19-11/7/19 and treated for acute respiratory failure and aspiration pneumonia.  Pt failed MBSS during recent admission and refused PEG placement at that time.  Pt was discharged to [facility] on Augmentin. Pt is a full code and Shabbir Huffman (son) at [phone] and Alina Atwood (significant other) at [phone] are the surrogate decision makers.  ER work up showed: Na 151, Cl 117, BUN 32, Alk Phos 181, Bili 1.7, and Albumin 2.5.   Chest xray showed increasing patchy density at the right base suspicious for pneumonia with stable consolidation medial right base.  CT of head showed no acute intracranial abnormality with atrophy present and old lacunar insult suspected on the right.  UA positive for infectious process.  Hospital Medicine contacted for admission to Observation Unit.     Overview/Hospital Course:  On 11/10 patient was placed in OBS secondary to Aspiration PNA, UTI, AMS, and Coumadin  toxicity with an initial INR of 7.0.  CT head showed no acute intracranial abnormality.  Atrophy is present.  Old lacunar insult suspected on the right.  Patient was recently hospitalized here for aspiration PNA requiring intubation and mechanical ventilation at that time.  After stabilization patient was discharged to [facility] for continued PT/OT/ST.  The patient was evaluated by ST on his last admission who recommended strict NPO given continued risk for aspiration pneumonia.  A PEG tube was recommended at that time, however, patient adamantly refused, and wanted to continue eating by mouth having understood the risk of oral intake.  BC were drawn and patient was started on IV antibiotics.  He was also given Vitamin K given active nose bleeding with elevated INR.    As of 11/11 Multiple acute events noted overnight including placement of an NGT.  Patient with worsening respiratory distress requiring increased amounts of oxygen, currently on ventimask with 15L O2.  Nose bleeding ceased after administration of vitamin K and INR has improved to 3.9.  Per d/w patient's son who is present at the , he is now a DNR.  Son agrees to conservative treatment of UTI and pneumonia however he does not want any resuscitative measures including no CPR, no emergency drugs, no defibrillation, and no intubation/mechanical ventilation.  Of note, son reports patient was recently diagnosed with Parkinson's within the past year and his significant other reports associated difficulty swallowing for the past 2-3 months.  Prior to his most recent hospitalization here patient was an active 86-year-old who traveled here from [place].  ST following and recommended strict NPO status.  Son states if patient stabilizes he would reconsider PEG placement.  Registered dietitian consulted for tube feeding recommendations and patient will be started on Isosource 1.5 at 60 cc/hour with 100 cc of free water q 4 hours.  Will also start gentle IVFs  with D5W at 50 cc/hour.  BC show NGTD.  UC pending.  Will plan to DC zuluaga cath in AM.  PT/OT following and recommended SNF versus rehab once medically stable.  Patient is currently in guarded condition.     Interval History: Multiple acute events noted overnight including placement of an NGT.  Patient with worsening respiratory distress requiring increased amounts of oxygen, currently on ventimask with 15L O2.  Nose bleeding ceased after administration of vitamin K and INR has improved to 3.9.  Per d/w patient's son who is present at the , he is now a DNR.  Son agrees to conservative treatment of UTI and pneumonia however he does not want any resuscitative measures including no CPR, no emergency drugs, no defibrillation, and no intubation/mechanical ventilation.  Of note, son reports patient was recently diagnosed with Parkinson's within the past year and his significant other reports associated difficulty swallowing for the past 2-3 months.  Prior to his most recent hospitalization here patient was an active 86-year-old who traveled here from [place].  ST following and recommended strict NPO status.  Son states if patient stabilizes he would reconsider PEG placement.  Registered dietitian consulted for tube feeding recommendations and patient will be started on Isosource 1.5 at 60 cc/hour with 100 cc of free water q 4 hours.  Will also start gentle IVFs with D5W at 50 cc/hour.  BC show NGTD.  UC pending.  Will plan to DC zuluaga cath in AM.  PT/OT following and recommended SNF versus rehab once medically stable.  Patient is currently in guarded condition.     Review of Systems   Unable to perform ROS: Mental status change     Objective:     Vital Signs (Most Recent):  Temp: 97.3 °F (36.3 °C) (11/11/19 0703)  Pulse: 82 (11/11/19 1310)  Resp: (!) 24 (11/11/19 0853)  BP: (!) 93/54(MD informed) (11/11/19 0929)  SpO2: 96 % (11/11/19 1200) Vital Signs (24h Range):  Temp:  [97.3 °F (36.3 °C)-97.9 °F (36.6 °C)] 97.3 °F  (36.3 °C)  Pulse:  [] 82  Resp:  [18-36] 24  SpO2:  [87 %-100 %] 96 %  BP: ()/() 93/54     Weight: 68.5 kg (151 lb 0.2 oz)  Body mass index is 19.39 kg/m².    Intake/Output Summary (Last 24 hours) at 11/11/2019 1420  Last data filed at 11/11/2019 0929  Gross per 24 hour   Intake 300 ml   Output 1050 ml   Net -750 ml      Physical Exam   Constitutional: He appears lethargic. He appears cachectic. He appears toxic. He appears ill.   HENT:   Head: Normocephalic.   Nose: Nose normal.   Mouth/Throat: Mucous membranes are dry. He has dentures.   Eyes: Conjunctivae are normal.   Neck: Normal range of motion. Neck supple.   Cardiovascular: Normal rate and intact distal pulses. An irregularly irregular rhythm present.   No murmur heard.  Pulmonary/Chest: Accessory muscle usage present. No stridor. Tachypnea noted. He is in respiratory distress. He has decreased breath sounds in the right middle field and the right lower field. He has no wheezes. He has rhonchi in the left lower field. He has no rales.   Coarse throughout with scattered wheezing and rhonchi to all lobes.  Currently on VentiMask at 15L with use of accessory muscles noted.  + tachypnea.   Abdominal: Soft. Bowel sounds are normal. He exhibits no distension. There is no tenderness.   Genitourinary:   Genitourinary Comments: Vallejo catheter present    Musculoskeletal:   Generalized weakness    Neurological: He appears lethargic. GCS eye subscore is 3. GCS verbal subscore is 4. GCS motor subscore is 6.   Pt oriented to person.  Pt reoriented to time and situation.    Skin: Skin is warm and dry.   Skin is thin with skin tears present to BUE   Psychiatric: He has a normal mood and affect.   Nursing note reviewed.      Significant Labs:   Blood Culture:   Recent Labs   Lab 11/10/19  0753 11/10/19  0756   LABBLOO No Growth to date No Growth to date     CBC:   Recent Labs   Lab 11/10/19  0748 11/10/19  1756 11/11/19  0159 11/11/19  0528   WBC 9.50  --    --  8.97   HGB 13.6* 13.2* 13.0* 12.6*   HCT 44.0 42.5 42.1 40.9     --   --  197     CMP:   Recent Labs   Lab 11/10/19  0748 11/11/19  0528   * 155*   K 4.2 4.4   * 117*   CO2 26 29   GLU 82 89   BUN 32* 50*   CREATININE 0.9 1.1   CALCIUM 9.0 8.8   PROT 6.4 6.3   ALBUMIN 2.5* 2.4*   BILITOT 1.7* 1.2*   ALKPHOS 181* 172*   AST 26 26   ALT <5* 15   ANIONGAP 8 9   EGFRNONAA >60 >60     Coagulation:   Recent Labs   Lab 11/11/19  0528   INR 3.9*       Significant Imaging: I have reviewed all pertinent imaging results/findings within the past 24 hours.      Assessment/Plan:      * Pneumonia of right lower lobe due to infectious organism  - Placed in OBS and transitioned to inpatient  - Recently treated for aspiration PNA and discharged to [facility] on Augmentin   - Failed MBSS and refused PEG on last admission with pureed diet and honey thick liquids recommended   - BC show NGTD  - Continue IV ABX  - Supplemental O2  - Scheduled nebs  - Strict NPO  - Supportive care  - Patient is a DNR      Acute respiratory failure with hypoxia  - Multifactorial given aspiration PNA and severely deconditioned state  - Continue supplemental O2  - Per son he is a DNR and does not want intubation/mechanical ventilation  - Treat PNA  - Scheduled nebs  - Supportive care  - Condition guarded      UTI (urinary tract infection)  - UA consistent with infectious process   - BC show NGTD  - UC pending  - Plan to DC zuluaga in AM  - Continue IV ABX  - Use cultures to guide therapy      Altered mental status  - In the setting of UTI and PNA  - CT of head showed no acute intracranial abnormality and atrophy is present with old lacunar insult suspected on the right.  - neuro checks   - Supportive care      Supratherapeutic INR  - INR improved to 3.9 with no active bleeding s/p Vitamin K  - Continue to hold Coumadin for now  - PharmD following to resume when safe      Dysphagia  - Failed MBSS and refused PEG on last admission with  pureed diet and honey thick liquids recommended   - Now with recurrent PNA, aware of the risks associated with continued oral intake   - ST following  - Strict NPO  - Given NGT, will start on TF  - Dietician consulted for TF recs  - HOB elevated >30 AAT  - Will re-address possibility of PEG placement pending medical stability and clinical course      Hypernatremia  - Na elevated to 155 and likely secondary to dehydration/decreased oral intake  - Start on free water flushes, 100 cc q 4 hours  - Start gentle IVFs with D5W at 50 cc/hour  - Serial BMP to avoid precipitous drop      Dehydration  - Start TF per dietician recs  - Free water flushes, 100 cc q 4 hours  - Gentle IVFs  - Monitor      Atrial fibrillation  - Currently rate controlled   - Continue home Coreg   - Hold Coumadin given supratherapeutic INR  - PharmD following      Malnutrition of moderate degree  - Dietician following  - Start TF and free water flushes per recs      Physical deconditioning  - pt discharged to [facility] on 11/7/19   - PT/OT consulted and recommended SNF versus Rehab placement pending medical stability      Parkinson disease  - Continue home Sinemet       Macrocytic anemia  - B12 and Folate pending  - Will give B12 1000 mcg sq x 1  - Daily CBC      VTE Risk Mitigation (From admission, onward)         Ordered     warfarin (COUMADIN) tablet 1 mg PLACEHOLDER DOSE ONLY  Daily      11/10/19 0988                      Hilda Bailey, CHIDI, ACNP-BC  Department of Hospital Medicine   [facility]

## 2019-11-11 NOTE — PT/OT/SLP PROGRESS
Occupational Therapy      Patient Name:  Shaq Huffman   MRN:  [id number]    OT attempted eval at 8:40am, pt with respiratory at this time. Attempted again at 9:50am, pt with MD and NP. OT eval initiated through chart review, will attempt to complete at later time.     Kalyn Jose, PT/OT  11/11/2019

## 2019-11-11 NOTE — NURSING
Pt pulling at NG tube and taking off oxygen. Sitter at bedside to more closely monitor pt. Will continue to monitor.

## 2019-11-11 NOTE — ASSESSMENT & PLAN NOTE
- pt discharged to [facility] on 11/7/19   - PT/OT consulted and recommended SNF versus Rehab placement pending medical stability

## 2019-11-11 NOTE — CONSULTS
"  [facility]  Adult Nutrition  Consult Note    SUMMARY     Recommendations    Recommendation/Intervention:   1. Recommend Isosource 1.5 @ 60 ml/hr  - to provide 2160kcals, 98g protein, 1100 ml fluid. H2O flush 100ml q4hr.    Goals:   1.>75% of EEN, EPN by RD follow up.   Nutrition Goal Status: new    Reason for Assessment    Reason For Assessment: consult  Diagnosis: pulmonary disease  Relevant Medical History: COPD, A fib  Interdisciplinary Rounds: did not attend  General Information Comments: Pt h/o Parkinson's dz, COPD, and Atrial fibrillation. Pt now has NG tube and awaiting TF recommendation.  NFPE: Unable to assess-RD covering remotely.  Nutrition Discharge Planning: To soon to determine.     Nutrition Risk Screen    Nutrition Risk Screen: dysphagia or difficulty swallowing    Nutrition/Diet History    Spiritual, Cultural Beliefs, Zoroastrianism Practices, Values that Affect Care: no    Anthropometrics    Temp: 97.3 °F (36.3 °C)  Height: 6' 2" (188 cm)  Height (inches): 74 in  Weight Method: Bed Scale  Weight: 68.5 kg (151 lb 0.2 oz)  Weight (lb): 151.02 lb  Ideal Body Weight (IBW), Male: 190 lb  % Ideal Body Weight, Male (lb): 79.48 lb  BMI (Calculated): 19.4  BMI Grade: 18.5-24.9 - normal     Lab/Procedures/Meds    Pertinent Labs Reviewed: reviewed  Pertinent Labs Comments: Na 155, Chloride 117, BUN 50  Pertinent Medications Reviewed: reviewed  Pertinent Medications Comments: Lisinopril    Estimated/Assessed Needs    Weight Used For Calorie Calculations: 83 kg (182 lb 15.7 oz)  Energy Calorie Requirements (kcal): 2758-0962 kcals/day(25-30 kcal/kg IBW)     Protein Requirements: 83..76 g/day(1.0-1.5 g/kg IBW)  Weight Used For Protein Calculations: 83 kg (182 lb 15.7 oz)  Fluid Requirements (mL): Per MD  Estimated Fluid Requirement Method: RDA Method  RDA Method (mL): 2075    Nutrition Prescription Ordered    Current Diet Order: NPO    Evaluation of Received Nutrient/Fluid Intake    Energy " Calories Required: not meeting needs  Protein Required: not meeting needs  Fluid Required: meeting needs  % Intake of Estimated Energy Needs: 0 %  % Meal Intake: NPO    Nutrition Risk    Level of Risk/Frequency of Follow-up: moderate     Assessment and Plan    Nutrition Problem  Inadequate energy intake     Related to (etiology):   Inability to consume sufficient energy     Signs and Symptoms (as evidenced by):   NPO     Interventions/Recommendations (treatment strategy):  Collaboration of nutrition care w/ other providers   Enteral Nutrition     Nutrition Diagnosis Status:   New     Monitor and Evaluation    Food and Nutrient Intake: energy intake, enteral nutrition intake  Food and Nutrient Adminstration: diet order  Knowledge/Beliefs/Attitudes: food and nutrition knowledge/skill  Physical Activity and Function: nutrition-related ADLs and IADLs  Anthropometric Measurements: weight  Biochemical Data, Medical Tests and Procedures: electrolyte and renal panel, inflammatory profile, lipid profile, gastrointestinal profile, glucose/endocrine profile  Nutrition-Focused Physical Findings: overall appearance     Malnutrition Assessment     Unable to assess-RD covering remotely.    Nutrition Follow-Up    RD Follow-up?: Yes

## 2019-11-11 NOTE — PLAN OF CARE
Pt AAO to person, place, and situation; disoriented to time. VSS. Pt remained free of falls this shift. Medications administered as ordered. Pt is Afib on monitor. PICC intact, saline locked. Oxygen therapy continued. Sitter at bedside. Hourly rounding completed. Pt instructed to call for assistance. POC reviewed. Pt verbalized understanding. Will continue to monitor.

## 2019-11-11 NOTE — PROGRESS NOTES
Clinical Pharmacy Progress Note: Coumadin Dosing and Monitoring     Goal INR: 2-3   Indication: Atrial fibrillation    Lab Results   Component Value Date    INR 3.9 (H) 11/11/2019    INR 7.0 (HH) 11/10/2019    INR 3.9 (H) 11/07/2019     Patient has not yet been educated by pharmacist this admission.   Drug interactions: Azithromycin and Ceftriaxone may increase the risk of bleeding.    Plan:   - Due to supra-therapeutic INR, HOLD warfarin again today.  - Warfarin place gracia order to remain in [facility] for pharmacy follow-up.   - Pharmacy will continue to monitor daily PT/INR. Dose adjustments will be made accordingly.      Thank you for allowing us to participate in this patient's care.      Vivian Richards PharmD 11/11/2019 3:28 PM

## 2019-11-11 NOTE — ASSESSMENT & PLAN NOTE
- Currently rate controlled   - Continue home Coreg   - Hold Coumadin given supratherapeutic INR  - PharmD following

## 2019-11-11 NOTE — NURSING
Pt's nose is actively bleeding. Respiratory called to change nasal cannula to ventimask. Karla Baxter NP notified, ordered Afrin nasal spray and oral vitamin K, NG tube to be placed to administer vitamin K because pt failed swallow study. VSS. Will continue to monitor.

## 2019-11-11 NOTE — NURSING
NG tube placed by house supervisor, WILL Billings. Vitamin K administered. Pt's nose intermittently bleeding. Jasper used to suction blood from pt's mouth. Dr. Castellon notified, no new orders at this time. Will continue to monitor.

## 2019-11-11 NOTE — SUBJECTIVE & OBJECTIVE
Interval History: Multiple acute events noted overnight including placement of an NGT.  Patient with worsening respiratory distress requiring increased amounts of oxygen, currently on ventimask with 15L O2.  Nose bleeding ceased after administration of vitamin K and INR has improved to 3.9.  Per d/w patient's son who is present at the , he is now a DNR.  Son agrees to conservative treatment of UTI and pneumonia however he does not want any resuscitative measures including no CPR, no emergency drugs, no defibrillation, and no intubation/mechanical ventilation.  Of note, son reports patient was recently diagnosed with Parkinson's within the past year and his significant other reports associated difficulty swallowing for the past 2-3 months.  Prior to his most recent hospitalization here patient was an active 86-year-old who traveled here from [place].  ST following and recommended strict NPO status.  Son states if patient stabilizes he would reconsider PEG placement.  Registered dietitian consulted for tube feeding recommendations and patient will be started on Isosource 1.5 at 60 cc/hour with 100 cc of free water q 4 hours.  Will also start gentle IVFs with D5W at 50 cc/hour.  BC show NGTD.  UC pending.  Will plan to DC zuluaga cath in AM.  PT/OT following and recommended SNF versus rehab once medically stable.  Patient is currently in guarded condition.     Review of Systems   Unable to perform ROS: Mental status change     Objective:     Vital Signs (Most Recent):  Temp: 97.3 °F (36.3 °C) (11/11/19 0703)  Pulse: 82 (11/11/19 1310)  Resp: (!) 24 (11/11/19 0853)  BP: (!) 93/54(MD informed) (11/11/19 0929)  SpO2: 96 % (11/11/19 1200) Vital Signs (24h Range):  Temp:  [97.3 °F (36.3 °C)-97.9 °F (36.6 °C)] 97.3 °F (36.3 °C)  Pulse:  [] 82  Resp:  [18-36] 24  SpO2:  [87 %-100 %] 96 %  BP: ()/() 93/54     Weight: 68.5 kg (151 lb 0.2 oz)  Body mass index is 19.39 kg/m².    Intake/Output Summary (Last 24  hours) at 11/11/2019 1420  Last data filed at 11/11/2019 0929  Gross per 24 hour   Intake 300 ml   Output 1050 ml   Net -750 ml      Physical Exam   Constitutional: He appears lethargic. He appears cachectic. He appears toxic. He appears ill.   HENT:   Head: Normocephalic.   Nose: Nose normal.   Mouth/Throat: Mucous membranes are dry. He has dentures.   Eyes: Conjunctivae are normal.   Neck: Normal range of motion. Neck supple.   Cardiovascular: Normal rate and intact distal pulses. An irregularly irregular rhythm present.   No murmur heard.  Pulmonary/Chest: Accessory muscle usage present. No stridor. Tachypnea noted. He is in respiratory distress. He has decreased breath sounds in the right middle field and the right lower field. He has no wheezes. He has rhonchi in the left lower field. He has no rales.   Coarse throughout with scattered wheezing and rhonchi to all lobes.  Currently on VentiMask at 15L with use of accessory muscles noted.  + tachypnea.   Abdominal: Soft. Bowel sounds are normal. He exhibits no distension. There is no tenderness.   Genitourinary:   Genitourinary Comments: Vallejo catheter present    Musculoskeletal:   Generalized weakness    Neurological: He appears lethargic. GCS eye subscore is 3. GCS verbal subscore is 4. GCS motor subscore is 6.   Pt oriented to person.  Pt reoriented to time and situation.    Skin: Skin is warm and dry.   Skin is thin with skin tears present to BUE   Psychiatric: He has a normal mood and affect.   Nursing note reviewed.      Significant Labs:   Blood Culture:   Recent Labs   Lab 11/10/19  0753 11/10/19  0756   LABBLOO No Growth to date No Growth to date     CBC:   Recent Labs   Lab 11/10/19  0748 11/10/19  1756 11/11/19  0159 11/11/19  0528   WBC 9.50  --   --  8.97   HGB 13.6* 13.2* 13.0* 12.6*   HCT 44.0 42.5 42.1 40.9     --   --  197     CMP:   Recent Labs   Lab 11/10/19  0748 11/11/19  0528   * 155*   K 4.2 4.4   * 117*   CO2 26 29   GLU  82 89   BUN 32* 50*   CREATININE 0.9 1.1   CALCIUM 9.0 8.8   PROT 6.4 6.3   ALBUMIN 2.5* 2.4*   BILITOT 1.7* 1.2*   ALKPHOS 181* 172*   AST 26 26   ALT <5* 15   ANIONGAP 8 9   EGFRNONAA >60 >60     Coagulation:   Recent Labs   Lab 11/11/19  0528   INR 3.9*       Significant Imaging: I have reviewed all pertinent imaging results/findings within the past 24 hours.

## 2019-11-11 NOTE — CHAPLAIN
Initial visit with patient and family.  Provided ministries of listening and presence.  Presented a small flag to pt and thanked him for his service to our country.  Pt currently had no other spiritual needs and I will follow up as needed.    Chaplain Ty Garg M.Div., [facility]

## 2019-11-11 NOTE — HOSPITAL COURSE
On 11/10 patient was placed in OBS secondary to Aspiration PNA, UTI, AMS, and Coumadin toxicity with an initial INR of 7.0.  CT head showed no acute intracranial abnormality.  Atrophy is present.  Old lacunar insult suspected on the right.  Patient was recently hospitalized here for aspiration PNA requiring intubation and mechanical ventilation at that time.  After stabilization patient was discharged to [facility] for continued PT/OT/ST.  The patient was evaluated by ST on his last admission who recommended strict NPO given continued risk for aspiration pneumonia.  A PEG tube was recommended at that time, however, patient adamantly refused, and wanted to continue eating by mouth having understood the risk of oral intake.  BC were drawn and patient was started on IV antibiotics.  He was also given Vitamin K given active nose bleeding with elevated INR.    As of 11/11 Multiple acute events noted overnight including placement of an NGT.  Patient with worsening respiratory distress requiring increased amounts of oxygen, currently on ventimask with 15L O2.  Nose bleeding ceased after administration of vitamin K and INR has improved to 3.9.  Per d/w patient's son who is present at the , he is now a DNR.  Son agrees to conservative treatment of UTI and pneumonia however he does not want any resuscitative measures including no CPR, no emergency drugs, no defibrillation, and no intubation/mechanical ventilation.  Of note, son reports patient was recently diagnosed with Parkinson's within the past year and his significant other reports associated difficulty swallowing for the past 2-3 months.  Prior to his most recent hospitalization here patient was an active 86-year-old who traveled here from [place].  ST following and recommended strict NPO status.  Son states if patient stabilizes he would reconsider PEG placement.  Registered dietitian consulted for tube feeding recommendations and patient will be started on Isosource  1.5 at 60 cc/hour with 100 cc of free water q 4 hours.  Will also start gentle IVFs with D5W at 50 cc/hour.  BC show NGTD.  UC pending.  Will plan to DC zuluaga cath in AM.  PT/OT following and recommended SNF versus rehab once medically stable.  Patient is currently in guarded condition.     As of 11/12 Patient's respiratory status has improved slightly from yesterday.  Still requiring high flow oxygen.  D/W RT who will attempt to wean as tolerated to NC.  Na remains elevated, will increase free H2O flushes to 200 cc q 4 hours and continue D5W IVFs.  BC and UC shows NGTD.  Will continue Rocephin and Azithromycin for PNA.  PT/OT/ST continues to follow.  Patient is severely deconditioned and remains in guarded condition.      As of 11/13 Patient is much more alert today and answering questions appropriately.  ST continues to follow and recommends strict NPO.  Discussed treatment options at length with patient and his son who is present at the BS, and patient is currently requesting PEG placement if possible.  Will consult GI for evaluation.  Continue IV ABX for PNA.  Plan to hold AC for now given possibility of PEG placement.  Patient and son agree to transitioning Coumadin to Eliquis after PEG placement.  Cardiology consult in AM for procedure clearance.    On 11/14/19, pt alert and answering questions appropriately when questioned.  GI consulted with evaluation for PEG placement completed and cardiac clearance completed.  Placement of PEG recommended with moderate cardiac risk assessed.  PEG placement with GI vs IR discussed.  IR consulted to evaluate for placement.  Will await recommendations. Pt continues to experience productive cough and difficulty with expectoration.  Pt encouraged to clear secretions and Yankauer at bedside.  Current plan of care continued.      On 11/15/19, pt evaluated by IR for PEG placement with procedure planned for 11/18/19. Coumadin to be held for approximately 5 days prior to procedure.   NGT remains in place with pt tolerating tube feeding.  Plan of care discussed with family at bedside and understanding verbalized.  Current plan of care contiuned.      11/16- pt known to me from last admission. Chart reviewed, he had a Rapid Response called this am, he was not responding momentarily but VSS, afebrile, O2 Sats 92-93% on 2 L. BS normal. He is DNR. He had obvious chest gurgling and had difficulty expectorating it. deep ENT suction performed- lots of old blood mixed with mucus. He became responsive but looked very frail and cachectic, moribund. D/w pts' family- Ms Lind and his daughter Karla and updated them about his condition. They understand and just wished to keep him comfortable and do not want any further Tx and agreed with hospice and have signed up with [facility]. NGT removed and he appears more relaxed but constantly sleepy. Family at bedside. Will d/c to hospice in am.  11/17- pt remains somnolent with waxing and waning alertness, with little responsiveness, severely emaciated, cachectic. Chest gurgles present, prognosis very poor. Family at bedside and ready for him to be discharged to hospice. He was seen and examined and deemed appropriate for discharge to [facility] today.

## 2019-11-11 NOTE — PLAN OF CARE
SW attempted to meet with patient, pt currently not at bedside. Will f/u with pt.        11/11/19 8472   Discharge Assessment   Assessment Type Discharge Planning Assessment

## 2019-11-11 NOTE — ASSESSMENT & PLAN NOTE
- Multifactorial given aspiration PNA and severely deconditioned state  - Continue supplemental O2  - Per son he is a DNR and does not want intubation/mechanical ventilation  - Treat PNA  - Scheduled nebs  - Supportive care  - Condition guarded

## 2019-11-11 NOTE — ASSESSMENT & PLAN NOTE
- Placed in OBS and transitioned to inpatient  - Recently treated for aspiration PNA and discharged to [facility] on Augmentin   - Failed MBSS and refused PEG on last admission with pureed diet and honey thick liquids recommended   - BC show NGTD  - Continue IV ABX  - Supplemental O2  - Scheduled nebs  - Strict NPO  - Supportive care  - Patient is a DNR

## 2019-11-11 NOTE — ASSESSMENT & PLAN NOTE
- In the setting of UTI and PNA  - CT of head showed no acute intracranial abnormality and atrophy is present with old lacunar insult suspected on the right.  - neuro checks   - Supportive care

## 2019-11-11 NOTE — ASSESSMENT & PLAN NOTE
- Failed MBSS and refused PEG on last admission with pureed diet and honey thick liquids recommended   - Now with recurrent PNA, aware of the risks associated with continued oral intake   - ST following  - Strict NPO  - Given NGT, will start on TF  - Dietician consulted for TF recs  - HOB elevated >30 AAT  - Will re-address possibility of PEG placement pending medical stability and clinical course

## 2019-11-11 NOTE — NURSING
Karla Baxter NP notified that the pt's nose is continuing to bleed. Clots suctioned from pt's throat. Ordered H&H and to give another dose of Afrin nasal spray. Will continue to monitor.

## 2019-11-11 NOTE — ASSESSMENT & PLAN NOTE
- Na elevated to 155 and likely secondary to dehydration/decreased oral intake  - Start on free water flushes, 100 cc q 4 hours  - Start gentle IVFs with D5W at 50 cc/hour  - Serial BMP to avoid precipitous drop

## 2019-11-11 NOTE — PLAN OF CARE
Recommendations    Recommendation/Intervention:   1. Recommend Isosource 1.5 @ 60 ml/hr  - to provide 2160kcals, 98g protein, 1100 ml fluid. H2O flush 100ml q4hr.    Goals:   1.>75% of EEN, EPN by RD follow up.   Nutrition Goal Status: new

## 2019-11-11 NOTE — ASSESSMENT & PLAN NOTE
- INR improved to 3.9 with no active bleeding s/p Vitamin K  - Continue to hold Coumadin for now  - PharmD following to resume when safe

## 2019-11-11 NOTE — PT/OT/SLP EVAL
Occupational Therapy   Evaluation    Name: Shaq Huffman  MRN: [id number]  Admitting Diagnosis:  Pneumonia of right lower lobe due to infectious organism      Recommendations:     Discharge Recommendations: rehabilitation facility  Discharge Equipment Recommendations:  walker, rolling  Barriers to discharge:  None    Assessment:     Shaq Huffman is a 86 y.o. male with a medical diagnosis of Pneumonia of right lower lobe due to infectious organism.  He presents with impaired functional mobility and ADLs. Performance deficits affecting function: weakness, impaired endurance, gait instability, impaired functional mobilty, impaired self care skills, impaired balance, decreased lower extremity function, decreased upper extremity function, decreased coordination, decreased safety awareness.      Rehab Prognosis: Good; patient would benefit from acute skilled OT services to address these deficits and reach maximum level of function.       Plan:     Patient to be seen 3 x/week to address the above listed problems via self-care/home management, therapeutic activities, therapeutic exercises  · Plan of Care Expires: 11/18/19  · Plan of Care Reviewed with: patient, son, significant other    Subjective     Chief Complaint: weakness  Patient/Family Comments/goals: to get stronger    Occupational Profile:  Living Environment: lives with girlfriend in 1 story with 2 steps to the porch then 2 steps into the house  Previous level of function: (I) with ADLs and ambulated at time with SPC  Roles and Routines: Drives  Equipment Used at Home:  cane, straight  Assistance upon Discharge: Family    Pain/Comfort:  · Pain Rating 1: 0/10    Patients cultural, spiritual, Orthodoxy conflicts given the current situation: no    Objective:     Communicated with: Nurse Mathews and epic chart review prior to session.  Patient found supine with NG tube, telemetry, peripheral IV, oxygen upon OT entry to room.    General Precautions: Standard,  aspiration, fall   Orthopedic Precautions:N/A   Braces: N/A     Occupational Performance:    Bed Mobility:    · Patient completed Rolling/Turning to Left with  moderate assistance  · Patient completed Rolling/Turning to Right with moderate assistance  · Patient completed Scooting/Bridging with maximal assistance  · Patient completed Supine to Sit with moderate assistance  · Patient completed Sit to Supine with moderate assistance    Functional Mobility/Transfers:  · Patient completed Sit <> Stand Transfer with minimum assistance  with  rolling walker   · Functional Mobility: Few steps forward and back, then side steps with Min A using RW    Activities of Daily Living:  · Upper Body Dressing: moderate assistance .  · Lower Body Dressing: maximal assistance .  · Toileting: total assistance zan    Cognitive/Visual Perceptual:  Cognitive/Psychosocial Skills:     -       Oriented to: Person, Place, Time and Situation   -       Follows Commands/attention:Follows two-step commands  -       Communication: clear/fluent  -       Memory: No Deficits noted  -       Safety awareness/insight to disability: impaired   Visual/Perceptual:      -Intact .    Physical Exam:  Dominant hand:    -       right  Upper Extremity Range of Motion:     -       Right Upper Extremity: WFL except at shoulder  -       Left Upper Extremity: WFL except at shoulder  Upper Extremity Strength:    -       Right Upper Extremity: 4/5 grossly  -       Left Upper Extremity: 4/5 grossly   Strength:    -       Right Upper Extremity: WFL  -       Left Upper Extremity: WFL    AMPAC 6 Click ADL:  AMPAC Total Score: 12    Treatment & Education:  OT Eval completed as listed above. Pt and family educated in role of OT and POC.   Education:    Patient left HOB elevated with all lines intact, call button in reach, Nurse Bisi notified and family present    GOALS:   Multidisciplinary Problems     Occupational Therapy Goals        Problem: Occupational Therapy  Goal    Goal Priority Disciplines Outcome Interventions   Occupational Therapy Goal     OT, PT/OT     Description:  LTGs to be met by 11/18/19  1. Pt will perform LE dressing with Min A  2. Pt will perform UE dressing with SBA  3. Pt will perform toilet t/f with SBA  4. Pt will perform (B) UE ROM exercises 1 x 20 reps                    History:     Past Medical History:   Diagnosis Date    A-fib     COPD (chronic obstructive pulmonary disease)        History reviewed. No pertinent surgical history.    Time Tracking:     OT Date of Treatment: 11/11/19  OT Start Time: 1015  OT Stop Time: 1040  OT Total Time (min): 25 min    Billable Minutes:Evaluation 15 min  Therapeutic Activity 10 min    Kalyn Jose, PT/OT  11/11/2019

## 2019-11-11 NOTE — PLAN OF CARE
11/11/19 1236   Final Note   Assessment Type Final Discharge Note   Anticipated Discharge Disposition Rehab   Right Care Referral Info   Post Acute Recommendation IRF   Facility Name [facility]

## 2019-11-11 NOTE — PLAN OF CARE
Care plan reviewed with patient and family. Able to swallow crushed pill with apple sauce. Able to pinch nose and sit upright during nose-bleeding episode. No plan for any intervention as of this time--to closely monitor for recurrent nosebleed. Vital signs stable. Able to rest most of the time. Speech clarity and alertness improved since admission. Awaiting lab result for the latest H&H. No other complaints made.

## 2019-11-11 NOTE — PLAN OF CARE
SW attempted to meet with patient again, sitter at bedside stated the family went to eat and pt not able to answer anything. Will f/u with pt to complete discharge assessment.

## 2019-11-12 PROBLEM — N39.0 UTI (URINARY TRACT INFECTION): Status: RESOLVED | Noted: 2019-11-10 | Resolved: 2019-11-12

## 2019-11-12 LAB
ALBUMIN SERPL BCP-MCNC: 2.2 G/DL (ref 3.5–5.2)
ALP SERPL-CCNC: 161 U/L (ref 55–135)
ALT SERPL W/O P-5'-P-CCNC: 5 U/L (ref 10–44)
ANION GAP SERPL CALC-SCNC: 5 MMOL/L (ref 8–16)
ANION GAP SERPL CALC-SCNC: 8 MMOL/L (ref 8–16)
ANION GAP SERPL CALC-SCNC: 8 MMOL/L (ref 8–16)
AST SERPL-CCNC: 31 U/L (ref 10–40)
BACTERIA UR CULT: NO GROWTH
BASOPHILS # BLD AUTO: 0.03 K/UL (ref 0–0.2)
BASOPHILS NFR BLD: 0.3 % (ref 0–1.9)
BILIRUB SERPL-MCNC: 1 MG/DL (ref 0.1–1)
BUN SERPL-MCNC: 44 MG/DL (ref 8–23)
BUN SERPL-MCNC: 44 MG/DL (ref 8–23)
BUN SERPL-MCNC: 45 MG/DL (ref 8–23)
CALCIUM SERPL-MCNC: 8.4 MG/DL (ref 8.7–10.5)
CALCIUM SERPL-MCNC: 8.4 MG/DL (ref 8.7–10.5)
CALCIUM SERPL-MCNC: 8.5 MG/DL (ref 8.7–10.5)
CHLORIDE SERPL-SCNC: 114 MMOL/L (ref 95–110)
CHLORIDE SERPL-SCNC: 114 MMOL/L (ref 95–110)
CHLORIDE SERPL-SCNC: 115 MMOL/L (ref 95–110)
CO2 SERPL-SCNC: 30 MMOL/L (ref 23–29)
CO2 SERPL-SCNC: 30 MMOL/L (ref 23–29)
CO2 SERPL-SCNC: 31 MMOL/L (ref 23–29)
CREAT SERPL-MCNC: 0.9 MG/DL (ref 0.5–1.4)
CREAT SERPL-MCNC: 0.9 MG/DL (ref 0.5–1.4)
CREAT SERPL-MCNC: 1 MG/DL (ref 0.5–1.4)
DIFFERENTIAL METHOD: ABNORMAL
EOSINOPHIL # BLD AUTO: 0.3 K/UL (ref 0–0.5)
EOSINOPHIL NFR BLD: 3.5 % (ref 0–8)
ERYTHROCYTE [DISTWIDTH] IN BLOOD BY AUTOMATED COUNT: 14.3 % (ref 11.5–14.5)
EST. GFR  (AFRICAN AMERICAN): >60 ML/MIN/1.73 M^2
EST. GFR  (NON AFRICAN AMERICAN): >60 ML/MIN/1.73 M^2
FOLATE SERPL-MCNC: 7.7 NG/ML (ref 4–24)
GLUCOSE SERPL-MCNC: 135 MG/DL (ref 70–110)
GLUCOSE SERPL-MCNC: 135 MG/DL (ref 70–110)
GLUCOSE SERPL-MCNC: 149 MG/DL (ref 70–110)
HCT VFR BLD AUTO: 38.2 % (ref 40–54)
HGB BLD-MCNC: 11.2 G/DL (ref 14–18)
IMM GRANULOCYTES # BLD AUTO: 0.05 K/UL (ref 0–0.04)
IMM GRANULOCYTES NFR BLD AUTO: 0.6 % (ref 0–0.5)
INR PPP: 1.5 (ref 0.8–1.2)
LYMPHOCYTES # BLD AUTO: 0.8 K/UL (ref 1–4.8)
LYMPHOCYTES NFR BLD: 9.2 % (ref 18–48)
MCH RBC QN AUTO: 31.8 PG (ref 27–31)
MCHC RBC AUTO-ENTMCNC: 29.3 G/DL (ref 32–36)
MCV RBC AUTO: 109 FL (ref 82–98)
MONOCYTES # BLD AUTO: 0.7 K/UL (ref 0.3–1)
MONOCYTES NFR BLD: 7.9 % (ref 4–15)
NEUTROPHILS # BLD AUTO: 7.1 K/UL (ref 1.8–7.7)
NEUTROPHILS NFR BLD: 78.5 % (ref 38–73)
NRBC BLD-RTO: 0 /100 WBC
PLATELET # BLD AUTO: 164 K/UL (ref 150–350)
PMV BLD AUTO: 10.3 FL (ref 9.2–12.9)
POTASSIUM SERPL-SCNC: 3.8 MMOL/L (ref 3.5–5.1)
POTASSIUM SERPL-SCNC: 4 MMOL/L (ref 3.5–5.1)
POTASSIUM SERPL-SCNC: 4 MMOL/L (ref 3.5–5.1)
PROT SERPL-MCNC: 5.6 G/DL (ref 6–8.4)
PROTHROMBIN TIME: 15.9 SEC (ref 9–12.5)
RBC # BLD AUTO: 3.52 M/UL (ref 4.6–6.2)
SODIUM SERPL-SCNC: 151 MMOL/L (ref 136–145)
SODIUM SERPL-SCNC: 152 MMOL/L (ref 136–145)
SODIUM SERPL-SCNC: 152 MMOL/L (ref 136–145)
VIT B12 SERPL-MCNC: 1763 PG/ML (ref 210–950)
WBC # BLD AUTO: 9.02 K/UL (ref 3.9–12.7)

## 2019-11-12 PROCEDURE — 80053 COMPREHEN METABOLIC PANEL: CPT

## 2019-11-12 PROCEDURE — 25000003 PHARM REV CODE 250: Performed by: INTERNAL MEDICINE

## 2019-11-12 PROCEDURE — 85610 PROTHROMBIN TIME: CPT

## 2019-11-12 PROCEDURE — 99900035 HC TECH TIME PER 15 MIN (STAT)

## 2019-11-12 PROCEDURE — 27100092 HC HIGH FLOW DELIVERY CANNULA

## 2019-11-12 PROCEDURE — 25000242 PHARM REV CODE 250 ALT 637 W/ HCPCS: Performed by: NURSE PRACTITIONER

## 2019-11-12 PROCEDURE — 97530 THERAPEUTIC ACTIVITIES: CPT

## 2019-11-12 PROCEDURE — 94761 N-INVAS EAR/PLS OXIMETRY MLT: CPT

## 2019-11-12 PROCEDURE — 25000003 PHARM REV CODE 250: Performed by: NURSE PRACTITIONER

## 2019-11-12 PROCEDURE — 80048 BASIC METABOLIC PNL TOTAL CA: CPT

## 2019-11-12 PROCEDURE — 27100171 HC OXYGEN HIGH FLOW UP TO 24 HOURS

## 2019-11-12 PROCEDURE — 94640 AIRWAY INHALATION TREATMENT: CPT

## 2019-11-12 PROCEDURE — 27000221 HC OXYGEN, UP TO 24 HOURS

## 2019-11-12 PROCEDURE — 85025 COMPLETE CBC W/AUTO DIFF WBC: CPT

## 2019-11-12 PROCEDURE — 97110 THERAPEUTIC EXERCISES: CPT | Performed by: PHYSICAL THERAPIST

## 2019-11-12 PROCEDURE — 21400001 HC TELEMETRY ROOM

## 2019-11-12 PROCEDURE — 97110 THERAPEUTIC EXERCISES: CPT

## 2019-11-12 PROCEDURE — 63600175 PHARM REV CODE 636 W HCPCS: Performed by: NURSE PRACTITIONER

## 2019-11-12 PROCEDURE — 92507 TX SP LANG VOICE COMM INDIV: CPT

## 2019-11-12 RX ORDER — WARFARIN 1 MG/1
1 TABLET ORAL DAILY
Status: DISCONTINUED | OUTPATIENT
Start: 2019-11-12 | End: 2019-11-13

## 2019-11-12 RX ADMIN — CARVEDILOL 6.25 MG: 6.25 TABLET, FILM COATED ORAL at 10:11

## 2019-11-12 RX ADMIN — DEXTROSE: 5 SOLUTION INTRAVENOUS at 07:11

## 2019-11-12 RX ADMIN — AZITHROMYCIN MONOHYDRATE 500 MG: 500 INJECTION, POWDER, LYOPHILIZED, FOR SOLUTION INTRAVENOUS at 03:11

## 2019-11-12 RX ADMIN — CARBIDOPA AND LEVODOPA 2 TABLET: 25; 100 TABLET ORAL at 10:11

## 2019-11-12 RX ADMIN — BRIMONIDINE TARTRATE 1 DROP: 1.5 SOLUTION OPHTHALMIC at 10:11

## 2019-11-12 RX ADMIN — CARBIDOPA AND LEVODOPA 2 TABLET: 25; 100 TABLET ORAL at 03:11

## 2019-11-12 RX ADMIN — CEFTRIAXONE 1 G: 1 INJECTION, SOLUTION INTRAVENOUS at 03:11

## 2019-11-12 RX ADMIN — CARBIDOPA AND LEVODOPA 2 TABLET: 25; 100 TABLET ORAL at 08:11

## 2019-11-12 RX ADMIN — IPRATROPIUM BROMIDE AND ALBUTEROL SULFATE 3 ML: .5; 3 SOLUTION RESPIRATORY (INHALATION) at 08:11

## 2019-11-12 RX ADMIN — BRIMONIDINE TARTRATE 1 DROP: 1.5 SOLUTION OPHTHALMIC at 08:11

## 2019-11-12 RX ADMIN — CEFTRIAXONE 1 G: 1 INJECTION, SOLUTION INTRAVENOUS at 01:11

## 2019-11-12 RX ADMIN — IPRATROPIUM BROMIDE AND ALBUTEROL SULFATE 3 ML: .5; 3 SOLUTION RESPIRATORY (INHALATION) at 07:11

## 2019-11-12 RX ADMIN — Medication 2 SPRAY: at 10:11

## 2019-11-12 RX ADMIN — FAMOTIDINE 20 MG: 20 TABLET ORAL at 10:11

## 2019-11-12 RX ADMIN — DEXTROSE: 5 SOLUTION INTRAVENOUS at 11:11

## 2019-11-12 RX ADMIN — IPRATROPIUM BROMIDE AND ALBUTEROL SULFATE 3 ML: .5; 3 SOLUTION RESPIRATORY (INHALATION) at 01:11

## 2019-11-12 RX ADMIN — BRIMONIDINE TARTRATE 1 DROP: 1.5 SOLUTION OPHTHALMIC at 03:11

## 2019-11-12 RX ADMIN — WARFARIN SODIUM 1 MG: 1 TABLET ORAL at 06:11

## 2019-11-12 NOTE — ASSESSMENT & PLAN NOTE
- Continue TF per dietician recs  - Free water flushes, 200 cc q 4 hours  - Gentle IVFs  - Monitor

## 2019-11-12 NOTE — ASSESSMENT & PLAN NOTE
- INR improved to 1.5 (7.0 on admit)  - No further bleeding since Vitamin k given  - PharmD following  - Resume Coumadin today  - Will discuss transitioning to Eliquis with family as this may be the safer option

## 2019-11-12 NOTE — ASSESSMENT & PLAN NOTE
- Placed in OBS and transitioned to inpatient  - Recently treated for aspiration PNA and discharged to [facility] on Augmentin   - Failed MBSS and refused PEG on last admission with pureed diet and honey thick liquids recommended   - BC show NGTD  - Continue IV ABX  - Supplemental O2  - Scheduled nebs  - Strict NPO  - Supportive care  - Patient is a DNR

## 2019-11-12 NOTE — PLAN OF CARE
ST provided education to pt's son on dysphagia, aspiration, PEG feedings, importance of oral care, and ST POC.  Currently, pt is requiring a venti-mask and producing a weak cough.  He is short of breath and did not produce any speech.  He is unable to participate in active  dysphagia ex's.

## 2019-11-12 NOTE — PLAN OF CARE
POC reviewed, verbalized understanding. Pt remained free from falls, fall precautions in place. Pt is a fib on monitor. VSS. No other c/o at this time.  PICC intact, infusing. NGT in place, with tube feeding going at 50 ml/hr. Goal rate 60 ml/hr. Call bell and personal belongings within reach. Hourly rounding complete. Reminded to call for assistance. Will continue to monitor.

## 2019-11-12 NOTE — ASSESSMENT & PLAN NOTE
- Multifactorial given aspiration PNA and severely deconditioned state  - Continue supplemental O2, will attempt to wean as tolerated  - Per son he is a DNR and does not want intubation/mechanical ventilation  - Treat PNA  - Scheduled nebs  - Supportive care  - Condition guarded

## 2019-11-12 NOTE — CARE UPDATE
Patient changed to 6L NC. Spo2 95%. Right nare cleaned of old blood from NG tube in that nare. Notified patient's nurse and nurse practitioner of change. Patient tolerating well so far.

## 2019-11-12 NOTE — PLAN OF CARE
Care plan reviewed with patient and family. BP improved. Seen by PT today--marched in place at the bedside. Still on Venti mask. Oral care done. No episode of nose bleeding today. Started on bladder training but no sensation to void--still [place] in. Started on tube feeding-for close monitoring and to increase as tolerated. With family and sitter at the bedside the whole shift. Few episodes of attempting to pull out tubes. No verbalization but able to follow instructions with moaning and nodding. No other complaints made.

## 2019-11-12 NOTE — PT/OT/SLP PROGRESS
"Occupational Therapy  Treatment    Shaq Huffman   MRN: [id number]   Admitting Diagnosis: Pneumonia of right lower lobe due to infectious organism    OT Date of Treatment: 11/12/19   OT Start Time: 1100  OT Stop Time: 1125  OT Total Time (min): 25 min    Billable Minutes:  Therapeutic Exercise 25 min    General Precautions: Standard, aspiration, fall  Orthopedic Precautions: N/A  Braces: N/A         Subjective:  Communicated with Nurse Talley and epic chart review prior to session.  Pt found supine in bed and only agreeable to bed exercises at this time.   Pain/Comfort  Pain Rating 1: 0/10  Pain Rating Post-Intervention 1: 0/10    Objective:  Patient found with: oxygen, peripheral IV, NG tube, telemetry, zuluaga catheter     Functional Mobility:   Therapeutic Activities and Exercises:  Pt performed (B) UE AROM exercises 1 x 10 reps in long sitting: bicep curls, chest press, shoulder flexion; (B) LE AROM 1 x 10 reps: ankle pumps, heel slides, quad sets. Pt refused any OOB activity at this time.      AM-PAC 6 CLICK ADL   How much help from another person does this patient currently need?   1 = Unable, Total/Dependent Assistance  2 = A lot, Maximum/Moderate Assistance  3 = A little, Minimum/Contact Guard/Supervision  4 = None, Modified [place]/Independent    Putting on and taking off regular lower body clothing? : 2  Bathing (including washing, rinsing, drying)?: 2  Toileting, which includes using toilet, bedpan, or urinal? : 2  Putting on and taking off regular upper body clothing?: 2  Taking care of personal grooming such as brushing teeth?: 2  Eating meals?: 2  Daily Activity Total Score: 12     AM-PAC Raw Score CMS "G-Code Modifier Level of Impairment Assistance   6 % Total / Unable   7 - 8 CM 80 - 100% Maximal Assist   9-13 CL 60 - 80% Moderate Assist   14 - 19 CK 40 - 60% Moderate Assist   20 - 22 CJ 20 - 40% Minimal Assist   23 CI 1-20% SBA / CGA   24 CH 0% Independent/ Mod I       Patient left HOB " elevated with all lines intact, call button in reach, Nurse Mayank notified and Family present    ASSESSMENT:  Shaq Huffman is a 86 y.o. male with a medical diagnosis of Pneumonia of right lower lobe due to infectious organism and presents with impaired functional mobility and ADLs. Pt will benefit from continued skilled OT in order to address impairments.     Rehab identified problem list/impairments: Rehab identified problem list/impairments: weakness, impaired endurance, gait instability, impaired functional mobilty, impaired self care skills, impaired balance, decreased lower extremity function, decreased upper extremity function, decreased coordination, decreased safety awareness    Rehab potential is fair.    Activity tolerance: Poor    Discharge recommendations: Discharge Facility/Level of Care Needs: rehabilitation facility     Barriers to discharge: Barriers to Discharge: None    Equipment recommendations: walker, rolling     GOALS:   Multidisciplinary Problems     Occupational Therapy Goals        Problem: Occupational Therapy Goal    Goal Priority Disciplines Outcome Interventions   Occupational Therapy Goal     OT, PT/OT Ongoing, Progressing    Description:  LTGs to be met by 11/18/19  1. Pt will perform LE dressing with Min A  2. Pt will perform UE dressing with SBA  3. Pt will perform toilet t/f with SBA  4. Pt will perform (B) UE ROM exercises 1 x 20 reps                    Plan:  Patient to be seen 3 x/week to address the above listed problems via self-care/home management, therapeutic activities, therapeutic exercises  Plan of Care expires: 11/18/19  Plan of Care reviewed with: patient, significant other, son    OT G-codes  Functional Assessment Tool Used: [facility]  Score: 12    Kalyn Jose, PT/OT  11/12/2019

## 2019-11-12 NOTE — PLAN OF CARE
Ongoing (interventions implemented as appropriate)  Pt not oriented to anything.  VSS  Pt remained afebrile throughout this shif  Pt remained free of falls this shift  Plan of care reviewed. Family verbalizes understanding.  Patient Afib on monitor.   Bed low, side rails up x 2, wheels locked, call light in reach.   Hourly rounding completed.   NG tube - Tube feeding @ 60ml/hr, tolerating well.  Will continue to monitor.

## 2019-11-12 NOTE — SUBJECTIVE & OBJECTIVE
Interval History:  No acute events overnight.  Currently resting comfortably in bed with son and significant other at .  Patient's respiratory status has improved slightly from yesterday.  Still requiring high flow oxygen.  D/W RT who will attempt to wean as tolerated to NC.  Na remains elevated, will increase free H2O flushes to 200 cc q 4 hours and continue D5W IVFs.  BC and UC shows NGTD.  Will continue Rocephin and Azithromycin for PNA.  PT/OT/ST continues to follow.  Patient is severely deconditioned and remains in guarded condition.      Review of Systems   Unable to perform ROS: Mental status change     Objective:     Vital Signs (Most Recent):  Temp: 96.2 °F (35.7 °C) (11/12/19 1417)  Pulse: 80 (11/12/19 1417)  Resp: 20 (11/12/19 1417)  BP: 96/63 (11/12/19 1417)  SpO2: 96 % (11/12/19 1302) Vital Signs (24h Range):  Temp:  [96.2 °F (35.7 °C)-98.6 °F (37 °C)] 96.2 °F (35.7 °C)  Pulse:  [71-97] 80  Resp:  [18-22] 20  SpO2:  [94 %-99 %] 96 %  BP: ()/(53-89) 96/63     Weight: 68.8 kg (151 lb 10.8 oz)  Body mass index is 19.47 kg/m².    Intake/Output Summary (Last 24 hours) at 11/12/2019 1545  Last data filed at 11/12/2019 1200  Gross per 24 hour   Intake 1015.83 ml   Output 800 ml   Net 215.83 ml      Physical Exam   Constitutional: He appears lethargic. He appears cachectic. He appears toxic. He appears ill.   HENT:   Head: Normocephalic.   Nose: Nose normal.   Mouth/Throat: Mucous membranes are dry. He has dentures.   Eyes: Conjunctivae are normal.   Neck: Normal range of motion. Neck supple.   Cardiovascular: Normal rate and intact distal pulses. An irregularly irregular rhythm present.   No murmur heard.  Pulmonary/Chest: Effort normal. No stridor. Tachypnea noted. No respiratory distress. He has decreased breath sounds in the right middle field and the right lower field. He has no wheezes. He has rhonchi in the left lower field. He has no rales.   Coarse throughout with scattered wheezing and rhonchi  to all lobes.  Currently on VentiMask at 15L.  Tachypnea has improved with no use of accessory muscles today.   Abdominal: Soft. Bowel sounds are normal. He exhibits no distension. There is no tenderness.   Genitourinary:   Genitourinary Comments: [place] catheter present    Musculoskeletal:   Generalized weakness    Neurological: He appears lethargic. GCS eye subscore is 3. GCS verbal subscore is 4. GCS motor subscore is 6.   Pt oriented to person.  Pt reoriented to time and situation.    Skin: Skin is warm and dry.   Skin is thin with skin tears present to E   Psychiatric: He has a normal mood and affect.   Nursing note reviewed.      Significant Labs:   Blood Culture: No results for input(s): LABBLOO in the last 48 hours.  CBC:   Recent Labs   Lab 11/11/19  0159 11/11/19  0528 11/12/19  0647   WBC  --  8.97 9.02   HGB 13.0* 12.6* 11.2*   HCT 42.1 40.9 38.2*   PLT  --  197 164     CMP:   Recent Labs   Lab 11/11/19  0528 11/11/19  1920 11/12/19  0100 11/12/19  0647   * 151* 151* 152*  152*   K 4.4 4.0 3.8 4.0  4.0   * 117* 115* 114*  114*   CO2 29 28 31* 30*  30*   GLU 89 132* 149* 135*  135*   BUN 50* 49* 45* 44*  44*   CREATININE 1.1 1.1 1.0 0.9  0.9   CALCIUM 8.8 8.6* 8.5* 8.4*  8.4*   PROT 6.3  --   --  5.6*   ALBUMIN 2.4*  --   --  2.2*   BILITOT 1.2*  --   --  1.0   ALKPHOS 172*  --   --  161*   AST 26  --   --  31   ALT 15  --   --  5*   ANIONGAP 9 6* 5* 8  8   EGFRNONAA >60 >60 >60 >60  >60     Coagulation:   Recent Labs   Lab 11/12/19  0647   INR 1.5*     Urine Culture: No results for input(s): LABURIN in the last 48 hours.    Significant Imaging: I have reviewed all pertinent imaging results/findings within the past 24 hours.

## 2019-11-12 NOTE — ASSESSMENT & PLAN NOTE
- Na elevated to 152 and likely secondary to dehydration/decreased oral intake  - Increase free water flushes to 200 cc q 4 hours  - Continue gentle IVFs with D5W at 50 cc/hour  - Serial BMP to avoid precipitous drop

## 2019-11-12 NOTE — CONSULTS
Discharge plans are pending hospital progress.  Patient needs to be medically stable to fly home to [place] on a commercial flight (paid for by his travel insurance).  May need to return to [place] before returning home.

## 2019-11-12 NOTE — ASSESSMENT & PLAN NOTE
- pt discharged to [facility] on 11/7/19   - PT/OT consulted and recommended Rehab placement pending course and medical stability

## 2019-11-12 NOTE — PLAN OF CARE
Patient improved to transfer OOB to chair; mod a bed mobility and min/mod A for sit to stand and pivot steps to chair; rec snf

## 2019-11-12 NOTE — ASSESSMENT & PLAN NOTE
- In the setting of PNA  - CT of head showed no acute intracranial abnormality and atrophy is present with old lacunar insult suspected on the right.  - neuro checks   - Supportive care

## 2019-11-12 NOTE — PROGRESS NOTES
[facility] Hospital Medicine  Progress Note    Patient Name: Shaq Huffman  MRN: [id number]  Patient Class: IP- Inpatient   Admission Date: 11/10/2019  Length of Stay: 1 days  Attending Physician: Josesito Franco MD  Primary Care Provider: Provider Notinsystem        Subjective:     Principal Problem:Pneumonia of right lower lobe due to infectious organism        HPI:   Shaq Huffman is a 86 y.o. male patient with a h/o Parkinson's dz, COPD, and Atrial fibrillation who presents to the Emergency Department for evaluation of worsening confusion which onset gradually several days ago. Symptoms are constant and moderate in severity. No mitigating or exacerbating factors reported. Associated sxs include generalized weakness, cough  (productive), decreased appetite, malaise, and dark urine. Patient denies any fever, chills, abd pain, SOB, CP, frequency/urgency, and all other sxs at this time. No prior Tx reported. No further complaints or concerns at this time. Pt was hospitalized at [facility] from 10/29/19-11/7/19 and treated for acute respiratory failure and aspiration pneumonia.  Pt failed MBSS during recent admission and refused PEG placement at that time.  Pt was discharged to [facility] on Augmentin. Pt is a full code and Shabbir Huffman (son) at [phone] and Alina Atwood (significant other) at [phone] are the surrogate decision makers.  ER work up showed: Na 151, Cl 117, BUN 32, Alk Phos 181, Bili 1.7, and Albumin 2.5.   Chest xray showed increasing patchy density at the right base suspicious for pneumonia with stable consolidation medial right base.  CT of head showed no acute intracranial abnormality with atrophy present and old lacunar insult suspected on the right.  UA positive for infectious process.  Hospital Medicine contacted for admission to Observation Unit.     Overview/Hospital Course:  On 11/10 patient was placed in OBS secondary to Aspiration PNA, UTI, AMS, and Coumadin  toxicity with an initial INR of 7.0.  CT head showed no acute intracranial abnormality.  Atrophy is present.  Old lacunar insult suspected on the right.  Patient was recently hospitalized here for aspiration PNA requiring intubation and mechanical ventilation at that time.  After stabilization patient was discharged to [facility] for continued PT/OT/ST.  The patient was evaluated by ST on his last admission who recommended strict NPO given continued risk for aspiration pneumonia.  A PEG tube was recommended at that time, however, patient adamantly refused, and wanted to continue eating by mouth having understood the risk of oral intake.  BC were drawn and patient was started on IV antibiotics.  He was also given Vitamin K given active nose bleeding with elevated INR.    As of 11/11 Multiple acute events noted overnight including placement of an NGT.  Patient with worsening respiratory distress requiring increased amounts of oxygen, currently on ventimask with 15L O2.  Nose bleeding ceased after administration of vitamin K and INR has improved to 3.9.  Per d/w patient's son who is present at the , he is now a DNR.  Son agrees to conservative treatment of UTI and pneumonia however he does not want any resuscitative measures including no CPR, no emergency drugs, no defibrillation, and no intubation/mechanical ventilation.  Of note, son reports patient was recently diagnosed with Parkinson's within the past year and his significant other reports associated difficulty swallowing for the past 2-3 months.  Prior to his most recent hospitalization here patient was an active 86-year-old who traveled here from [place].  ST following and recommended strict NPO status.  Son states if patient stabilizes he would reconsider PEG placement.  Registered dietitian consulted for tube feeding recommendations and patient will be started on Isosource 1.5 at 60 cc/hour with 100 cc of free water q 4 hours.  Will also start gentle IVFs  with D5W at 50 cc/hour.  BC show NGTD.  UC pending.  Will plan to DC zuluaga cath in AM.  PT/OT following and recommended SNF versus rehab once medically stable.  Patient is currently in guarded condition.     As of 11/12 Patient's respiratory status has improved slightly from yesterday.  Still requiring high flow oxygen.  D/W RT who will attempt to wean as tolerated to NC.  Na remains elevated, will increase free H2O flushes to 200 cc q 4 hours and continue D5W IVFs.  BC and UC shows NGTD.  Will continue Rocephin and Azithromycin for PNA.  PT/OT/ST continues to follow.  Patient is severely deconditioned and remains in guarded condition.      Interval History:  No acute events overnight.  Currently resting comfortably in bed with son and significant other at .  Patient's respiratory status has improved slightly from yesterday.  Still requiring high flow oxygen.  D/W RT who will attempt to wean as tolerated to NC.  Na remains elevated, will increase free H2O flushes to 200 cc q 4 hours and continue D5W IVFs.  BC and UC shows NGTD.  Will continue Rocephin and Azithromycin for PNA.  PT/OT/ST continues to follow.  Patient is severely deconditioned and remains in guarded condition.      Review of Systems   Unable to perform ROS: Mental status change     Objective:     Vital Signs (Most Recent):  Temp: 96.2 °F (35.7 °C) (11/12/19 1417)  Pulse: 80 (11/12/19 1417)  Resp: 20 (11/12/19 1417)  BP: 96/63 (11/12/19 1417)  SpO2: 96 % (11/12/19 1302) Vital Signs (24h Range):  Temp:  [96.2 °F (35.7 °C)-98.6 °F (37 °C)] 96.2 °F (35.7 °C)  Pulse:  [71-97] 80  Resp:  [18-22] 20  SpO2:  [94 %-99 %] 96 %  BP: ()/(53-89) 96/63     Weight: 68.8 kg (151 lb 10.8 oz)  Body mass index is 19.47 kg/m².    Intake/Output Summary (Last 24 hours) at 11/12/2019 1545  Last data filed at 11/12/2019 1200  Gross per 24 hour   Intake 1015.83 ml   Output 800 ml   Net 215.83 ml      Physical Exam   Constitutional: He appears lethargic. He appears  cachectic. He appears toxic. He appears ill.   HENT:   Head: Normocephalic.   Nose: Nose normal.   Mouth/Throat: Mucous membranes are dry. He has dentures.   Eyes: Conjunctivae are normal.   Neck: Normal range of motion. Neck supple.   Cardiovascular: Normal rate and intact distal pulses. An irregularly irregular rhythm present.   No murmur heard.  Pulmonary/Chest: Effort normal. No stridor. Tachypnea noted. No respiratory distress. He has decreased breath sounds in the right middle field and the right lower field. He has no wheezes. He has rhonchi in the left lower field. He has no rales.   Coarse throughout with scattered wheezing and rhonchi to all lobes.  Currently on VentiMask at 15L.  Tachypnea has improved with no use of accessory muscles today.   Abdominal: Soft. Bowel sounds are normal. He exhibits no distension. There is no tenderness.   Genitourinary:   Genitourinary Comments: Vallejo catheter present    Musculoskeletal:   Generalized weakness    Neurological: He appears lethargic. GCS eye subscore is 3. GCS verbal subscore is 4. GCS motor subscore is 6.   Pt oriented to person.  Pt reoriented to time and situation.    Skin: Skin is warm and dry.   Skin is thin with skin tears present to BUE   Psychiatric: He has a normal mood and affect.   Nursing note reviewed.      Significant Labs:   Blood Culture: No results for input(s): LABBLOO in the last 48 hours.  CBC:   Recent Labs   Lab 11/11/19  0159 11/11/19  0528 11/12/19  0647   WBC  --  8.97 9.02   HGB 13.0* 12.6* 11.2*   HCT 42.1 40.9 38.2*   PLT  --  197 164     CMP:   Recent Labs   Lab 11/11/19  0528 11/11/19  1920 11/12/19  0100 11/12/19  0647   * 151* 151* 152*  152*   K 4.4 4.0 3.8 4.0  4.0   * 117* 115* 114*  114*   CO2 29 28 31* 30*  30*   GLU 89 132* 149* 135*  135*   BUN 50* 49* 45* 44*  44*   CREATININE 1.1 1.1 1.0 0.9  0.9   CALCIUM 8.8 8.6* 8.5* 8.4*  8.4*   PROT 6.3  --   --  5.6*   ALBUMIN 2.4*  --   --  2.2*   BILITOT  1.2*  --   --  1.0   ALKPHOS 172*  --   --  161*   AST 26  --   --  31   ALT 15  --   --  5*   ANIONGAP 9 6* 5* 8  8   EGFRNONAA >60 >60 >60 >60  >60     Coagulation:   Recent Labs   Lab 11/12/19  0647   INR 1.5*     Urine Culture: No results for input(s): LABURIN in the last 48 hours.    Significant Imaging: I have reviewed all pertinent imaging results/findings within the past 24 hours.      Assessment/Plan:      * Pneumonia of right lower lobe due to infectious organism  - Placed in OBS and transitioned to inpatient  - Recently treated for aspiration PNA and discharged to [facility] on Augmentin   - Failed MBSS and refused PEG on last admission with pureed diet and honey thick liquids recommended   - BC show NGTD  - Continue IV ABX  - Supplemental O2  - Scheduled nebs  - Strict NPO  - Supportive care  - Patient is a DNR      Acute respiratory failure with hypoxia  - Multifactorial given aspiration PNA and severely deconditioned state  - Continue supplemental O2, will attempt to wean as tolerated  - Per son he is a DNR and does not want intubation/mechanical ventilation  - Treat PNA  - Scheduled nebs  - Supportive care  - Condition guarded      Altered mental status  - In the setting of PNA  - CT of head showed no acute intracranial abnormality and atrophy is present with old lacunar insult suspected on the right.  - neuro checks   - Supportive care      Supratherapeutic INR  - INR improved to 1.5 (7.0 on admit)  - No further bleeding since Vitamin k given  - PharmD following  - Resume Coumadin today  - Will discuss transitioning to Eliquis with family as this may be the safer option      Dysphagia  - Failed MBSS and refused PEG on last admission with pureed diet and honey thick liquids recommended   - Now with recurrent PNA, aware of the risks associated with continued oral intake   - ST following  - Strict NPO  - Continue TF via NGT  - Dietician consulted for TF recs  - HOB elevated >30 AAT  - Will re-address  possibility of PEG placement pending medical stability and clinical course      Hypernatremia  - Na elevated to 152 and likely secondary to dehydration/decreased oral intake  - Increase free water flushes to 200 cc q 4 hours  - Continue gentle IVFs with D5W at 50 cc/hour  - Serial BMP to avoid precipitous drop      Dehydration  - Continue TF per dietician recs  - Free water flushes, 200 cc q 4 hours  - Gentle IVFs  - Monitor      Atrial fibrillation  - Currently rate controlled   - Continue home Coreg   - Resume Coumadin today  - PharmD following for dosing  - Will discuss transitioning to Eliquis with family as this may be the safer option    Malnutrition of moderate degree  - Dietician following  - Continue TF and free water flushes per recs      Physical deconditioning  - pt discharged to [facility] on 11/7/19   - PT/OT consulted and recommended Rehab placement pending course and medical stability      Parkinson disease  - Continue home Sinemet       Macrocytic anemia  - B12 1763   - Folate 7.7  - Daily CBC        VTE Risk Mitigation (From admission, onward)         Ordered     warfarin (COUMADIN) tablet 1 mg  Daily      11/12/19 1482                      Hilda Bailey, CHIDI, ACNP-BC  Department of Hospital Medicine   [facility]

## 2019-11-12 NOTE — PROGRESS NOTES
Clinical Pharmacy Progress Note: Coumadin Dosing and Monitoring     Goal INR: 2-3   Indication: atrial fibrillation  Lab Results   Component Value Date    INR 1.5 (H) 11/12/2019    INR 3.9 (H) 11/11/2019    INR 7.0 (HH) 11/10/2019     Patient has not yet been educated by pharmacist this admission.   Drug interactions: Azithromycin and Ceftriaxone may increase the risk of bleeding.    Plan:   - Resume Warfarin conservatively at a dose of 1 mg daily.  - Defer to primary team regarding whether heparin bridge is indicated for anti-coagulation (discussed with Hilda Chen NP).   - Pharmacy will continue to monitor daily PT/INR. Dose adjustments will be made accordingly.      Thank you for allowing us to participate in this patient's care.      Vivian Richards PharmD 11/12/2019 2:38 PM

## 2019-11-12 NOTE — ASSESSMENT & PLAN NOTE
- Currently rate controlled   - Continue home Coreg   - Resume Coumadin today  - PharmD following for dosing  - Will discuss transitioning to Eliquis with family as this may be the safer option

## 2019-11-12 NOTE — PT/OT/SLP PROGRESS
Speech Language Pathology Treatment    Patient Name:  Shaq Huffman   MRN:  [id number]  Admitting Diagnosis: Pneumonia of right lower lobe due to infectious organism    Recommendations:                 General Recommendations:  Dysphagia therapy  Diet recommendations:  NPO, Liquid Diet Level: NPO   Aspiration Precautions: Frequent oral care   General Precautions: Standard, aspiration, fall, respiratory    Subjective     Pt was seen with his son at bedside.  Pt is not talking and requiring a venti-masks for O2 support.    Patient goals: none stated     Pain/Comfort:  · Pain Rating 1: 0/10    Objective:     Has the patient been evaluated by SLP for swallowing?   Yes  Keep patient NPO? Yes   Current Respiratory Status: Venti mask      ST provided education to pt's son on dysphagia, aspiration, PEG feedings, importance of oral care, and ST POC.  Currently, pt is requiring a venti-mask and producing a weak cough.  He is short of breath and did not produce any speech.  He is unable to participate in active ST dysphagia ex's.       Assessment:     Shaq Huffman is a 86 y.o. male with an SLP diagnosis of Dysphagia.  He presents with aspiration pneumonia, severe dysphagia, and decreased respiratory status.  Per pt's son, the pt is agreeable to PEG once he can tolerate procedure.  Pt will benefit from ST for dysphagia therapy.     Goals:   Multidisciplinary Problems     SLP Goals        Problem: SLP Goal    Goal Priority Disciplines Outcome   SLP Goal     SLP Ongoing, Progressing   Description:  LTG:  Pt will tolerate least restrictive diet consistency safely and efficiently.       ST. Oral motor tasks x 20 with cues             2. Oral Stim x20 with 100% timely laryngeal excursions             3. TBR and LE x20              4. Repeat MBSS if indicated                    Plan:     · Patient to be seen:  3 x/week   · Plan of Care expires:     · Plan of Care reviewed with:  patient, son, friend   · SLP Follow-Up:  Yes       Time Tracking:     SLP Treatment Date:   11/12/19  Speech Start Time:  1030  Speech Stop Time:  1045     Speech Total Time (min):  15 min    Billable Minutes: Speech Therapy Individual 15    Barbara Neville CCC-SLP  11/12/2019

## 2019-11-12 NOTE — ASSESSMENT & PLAN NOTE
- Failed MBSS and refused PEG on last admission with pureed diet and honey thick liquids recommended   - Now with recurrent PNA, aware of the risks associated with continued oral intake   - ST following  - Strict NPO  - Continue TF via NGT  - Dietician consulted for TF recs  - HOB elevated >30 AAT  - Will re-address possibility of PEG placement pending medical stability and clinical course

## 2019-11-12 NOTE — PT/OT/SLP PROGRESS
Physical Therapy  Treatment    Shaq Huffman   MRN: [id number]   Admitting Diagnosis: Pneumonia of right lower lobe due to infectious organism       PT Start Time: 1440     PT Stop Time: 1505    PT Total Time (min): 25 min       Billable Minutes:  Therapeutic Activity 15 and Therapeutic Exercise 10    Treatment Type: Treatment  PT/PTA: PT             General Precautions: Standard, aspiration, fall, respiratory  Orthopedic Precautions: N/A   Braces: N/A         Subjective:  Communicated with WILL Talley prior to session.      Pain/Comfort  Pain Rating 1: 0/10    Objective:   Patient found with: oxygen, peripheral IV, NG tube, telemetry, zuluaga catheter    Functional Mobility:  Bed Mobility: supine to/from sit mod A with cues to coord ue/le/trunk movements, plus use of bedrail       Transfers: sit to/from stand with handheld A min/mod A for balance       Gait: 4 pivot steps handheld A min/mod A for balance and safety       Stairs: unable      Balance:   Static Sit: sba  Dynamic Sit: cga  Static Stand: min a with hadnheld A  Dynamic stand: min/mod A handheld A    Therapeutic Activities and Exercises:  PT reviewed patient/sitter on POC, le rom to do in bed to prep mobility, safety/fall precautions with mobility and to sit up in chair x 30 minutes.    AM-PAC 6 CLICK MOBILITY  How much help from another person does this patient currently need?   1 = Unable, Total/Dependent Assistance  2 = A lot, Maximum/Moderate Assistance  3 = A little, Minimum/Contact Guard/Supervision  4 = None, Modified [place]/Independent    Turning over in bed (including adjusting bedclothes, sheets and blankets)?: 3  Sitting down on and standing up from a chair with arms (e.g., wheelchair, bedside commode, etc.): 3  Moving from lying on back to sitting on the side of the bed?: 2  Moving to and from a bed to a chair (including a wheelchair)?: 3  Need to walk in hospital room?: 2  Climbing 3-5 steps with a railing?: 1  Basic Mobility Total Score:  14    AM-PAC Raw Score CMS G-Code Modifier Level of Impairment Assistance   6 % Total / Unable   7 - 9 CM 80 - 100% Maximal Assist   10 - 14 CL 60 - 80% Moderate Assist   15 - 19 CK 40 - 60% Moderate Assist   20 - 22 CJ 20 - 40% Minimal Assist   23 CI 1-20% SBA / CGA   24 CH 0% Independent/ Mod I     Patient left up in chair with all lines intact, call button in reach, chair alarm on, RN notified and sitter present.    Assessment:  Shaq Huffman is a 86 y.o. male with a medical diagnosis of Pneumonia of right lower lobe due to infectious organism and presents with impaired mobility.    Rehab identified problem list/impairments: Rehab identified problem list/impairments: weakness, impaired self care skills, impaired balance, decreased coordination, decreased safety awareness, impaired functional mobilty, impaired endurance, gait instability, decreased lower extremity function, impaired cardiopulmonary response to activity    Rehab potential is fair.    Activity tolerance: Fair    Discharge recommendations: Discharge Facility/Level of Care Needs: nursing facility, skilled     Barriers to discharge:      Equipment recommendations: Equipment Needed After Discharge: walker, rolling(other needs tbd by next level of care)     GOALS:   Multidisciplinary Problems     Physical Therapy Goals        Problem: Physical Therapy Goal    Goal Priority Disciplines Outcome Goal Variances Interventions   Physical Therapy Goal     PT, PT/OT Ongoing, Progressing     Description:  In one wek(11/18)pt will:  1. Go sit to/from supine with CGA  2. Go sit to stand with CGA  3. Ambulate with RW 50 ft with Melani  4. SPT with Melani                    PLAN:    Patient to be seen 5 x/week  to address the above listed problems via gait training, therapeutic exercises, therapeutic activities  Plan of Care expires: 11/19/19  Plan of Care reviewed with: patient, other (see comments)(sitter)         Maninder Moran, PT  11/12/2019

## 2019-11-13 PROBLEM — R79.1 SUPRATHERAPEUTIC INR: Status: RESOLVED | Noted: 2019-11-10 | Resolved: 2019-11-13

## 2019-11-13 PROBLEM — I50.22 SYSTOLIC CHF, CHRONIC: Status: ACTIVE | Noted: 2019-11-13

## 2019-11-13 LAB
ANION GAP SERPL CALC-SCNC: 12 MMOL/L (ref 8–16)
ANION GAP SERPL CALC-SCNC: 4 MMOL/L (ref 8–16)
ANION GAP SERPL CALC-SCNC: 5 MMOL/L (ref 8–16)
ANION GAP SERPL CALC-SCNC: 6 MMOL/L (ref 8–16)
BASOPHILS # BLD AUTO: 0.04 K/UL (ref 0–0.2)
BASOPHILS NFR BLD: 0.5 % (ref 0–1.9)
BUN SERPL-MCNC: 33 MG/DL (ref 8–23)
BUN SERPL-MCNC: 34 MG/DL (ref 8–23)
BUN SERPL-MCNC: 34 MG/DL (ref 8–23)
BUN SERPL-MCNC: 36 MG/DL (ref 8–23)
CALCIUM SERPL-MCNC: 7.5 MG/DL (ref 8.7–10.5)
CALCIUM SERPL-MCNC: 8.2 MG/DL (ref 8.7–10.5)
CALCIUM SERPL-MCNC: 8.4 MG/DL (ref 8.7–10.5)
CALCIUM SERPL-MCNC: 8.6 MG/DL (ref 8.7–10.5)
CHLORIDE SERPL-SCNC: 102 MMOL/L (ref 95–110)
CHLORIDE SERPL-SCNC: 110 MMOL/L (ref 95–110)
CHLORIDE SERPL-SCNC: 111 MMOL/L (ref 95–110)
CHLORIDE SERPL-SCNC: 111 MMOL/L (ref 95–110)
CO2 SERPL-SCNC: 28 MMOL/L (ref 23–29)
CO2 SERPL-SCNC: 30 MMOL/L (ref 23–29)
CO2 SERPL-SCNC: 30 MMOL/L (ref 23–29)
CO2 SERPL-SCNC: 31 MMOL/L (ref 23–29)
CREAT SERPL-MCNC: 0.8 MG/DL (ref 0.5–1.4)
CREAT SERPL-MCNC: 0.9 MG/DL (ref 0.5–1.4)
DIFFERENTIAL METHOD: ABNORMAL
EOSINOPHIL # BLD AUTO: 0.4 K/UL (ref 0–0.5)
EOSINOPHIL NFR BLD: 4.3 % (ref 0–8)
ERYTHROCYTE [DISTWIDTH] IN BLOOD BY AUTOMATED COUNT: 13.9 % (ref 11.5–14.5)
EST. GFR  (AFRICAN AMERICAN): >60 ML/MIN/1.73 M^2
EST. GFR  (NON AFRICAN AMERICAN): >60 ML/MIN/1.73 M^2
GLUCOSE SERPL-MCNC: 119 MG/DL (ref 70–110)
GLUCOSE SERPL-MCNC: 120 MG/DL (ref 70–110)
GLUCOSE SERPL-MCNC: 132 MG/DL (ref 70–110)
GLUCOSE SERPL-MCNC: 414 MG/DL (ref 70–110)
HCT VFR BLD AUTO: 36.5 % (ref 40–54)
HGB BLD-MCNC: 11 G/DL (ref 14–18)
IMM GRANULOCYTES # BLD AUTO: 0.04 K/UL (ref 0–0.04)
IMM GRANULOCYTES NFR BLD AUTO: 0.5 % (ref 0–0.5)
INR PPP: 1.3 (ref 0.8–1.2)
LYMPHOCYTES # BLD AUTO: 0.7 K/UL (ref 1–4.8)
LYMPHOCYTES NFR BLD: 8.2 % (ref 18–48)
MCH RBC QN AUTO: 32.4 PG (ref 27–31)
MCHC RBC AUTO-ENTMCNC: 30.1 G/DL (ref 32–36)
MCV RBC AUTO: 108 FL (ref 82–98)
MONOCYTES # BLD AUTO: 0.8 K/UL (ref 0.3–1)
MONOCYTES NFR BLD: 8.5 % (ref 4–15)
NEUTROPHILS # BLD AUTO: 6.9 K/UL (ref 1.8–7.7)
NEUTROPHILS NFR BLD: 78 % (ref 38–73)
NRBC BLD-RTO: 0 /100 WBC
PLATELET # BLD AUTO: 181 K/UL (ref 150–350)
PMV BLD AUTO: 10.3 FL (ref 9.2–12.9)
POCT GLUCOSE: 106 MG/DL (ref 70–110)
POCT GLUCOSE: 128 MG/DL (ref 70–110)
POCT GLUCOSE: 142 MG/DL (ref 70–110)
POCT GLUCOSE: 94 MG/DL (ref 70–110)
POTASSIUM SERPL-SCNC: 3.5 MMOL/L (ref 3.5–5.1)
POTASSIUM SERPL-SCNC: 4.2 MMOL/L (ref 3.5–5.1)
POTASSIUM SERPL-SCNC: 4.2 MMOL/L (ref 3.5–5.1)
POTASSIUM SERPL-SCNC: 4.3 MMOL/L (ref 3.5–5.1)
PROTHROMBIN TIME: 13.5 SEC (ref 9–12.5)
RBC # BLD AUTO: 3.39 M/UL (ref 4.6–6.2)
SODIUM SERPL-SCNC: 142 MMOL/L (ref 136–145)
SODIUM SERPL-SCNC: 145 MMOL/L (ref 136–145)
SODIUM SERPL-SCNC: 146 MMOL/L (ref 136–145)
SODIUM SERPL-SCNC: 147 MMOL/L (ref 136–145)
WBC # BLD AUTO: 8.78 K/UL (ref 3.9–12.7)

## 2019-11-13 PROCEDURE — 21400001 HC TELEMETRY ROOM

## 2019-11-13 PROCEDURE — 99900035 HC TECH TIME PER 15 MIN (STAT)

## 2019-11-13 PROCEDURE — 80048 BASIC METABOLIC PNL TOTAL CA: CPT | Mod: 91

## 2019-11-13 PROCEDURE — 36415 COLL VENOUS BLD VENIPUNCTURE: CPT

## 2019-11-13 PROCEDURE — 92526 ORAL FUNCTION THERAPY: CPT

## 2019-11-13 PROCEDURE — 99222 PR INITIAL HOSPITAL CARE,LEVL II: ICD-10-PCS | Mod: ,,, | Performed by: PHYSICIAN ASSISTANT

## 2019-11-13 PROCEDURE — 85610 PROTHROMBIN TIME: CPT

## 2019-11-13 PROCEDURE — 80048 BASIC METABOLIC PNL TOTAL CA: CPT

## 2019-11-13 PROCEDURE — 94640 AIRWAY INHALATION TREATMENT: CPT

## 2019-11-13 PROCEDURE — 63600175 PHARM REV CODE 636 W HCPCS: Performed by: NURSE PRACTITIONER

## 2019-11-13 PROCEDURE — 94761 N-INVAS EAR/PLS OXIMETRY MLT: CPT

## 2019-11-13 PROCEDURE — 63600175 PHARM REV CODE 636 W HCPCS: Mod: JG | Performed by: NURSE PRACTITIONER

## 2019-11-13 PROCEDURE — 85025 COMPLETE CBC W/AUTO DIFF WBC: CPT

## 2019-11-13 PROCEDURE — 25000003 PHARM REV CODE 250: Performed by: NURSE PRACTITIONER

## 2019-11-13 PROCEDURE — 99222 1ST HOSP IP/OBS MODERATE 55: CPT | Mod: ,,, | Performed by: PHYSICIAN ASSISTANT

## 2019-11-13 PROCEDURE — 97116 GAIT TRAINING THERAPY: CPT

## 2019-11-13 PROCEDURE — 25000242 PHARM REV CODE 250 ALT 637 W/ HCPCS: Performed by: NURSE PRACTITIONER

## 2019-11-13 PROCEDURE — 97530 THERAPEUTIC ACTIVITIES: CPT

## 2019-11-13 PROCEDURE — 27000221 HC OXYGEN, UP TO 24 HOURS

## 2019-11-13 RX ORDER — GLUCAGON 1 MG
1 KIT INJECTION
Status: DISCONTINUED | OUTPATIENT
Start: 2019-11-13 | End: 2019-11-16

## 2019-11-13 RX ORDER — INSULIN ASPART 100 [IU]/ML
1-10 INJECTION, SOLUTION INTRAVENOUS; SUBCUTANEOUS EVERY 6 HOURS PRN
Status: DISCONTINUED | OUTPATIENT
Start: 2019-11-13 | End: 2019-11-16

## 2019-11-13 RX ADMIN — CARVEDILOL 6.25 MG: 6.25 TABLET, FILM COATED ORAL at 08:11

## 2019-11-13 RX ADMIN — CARBIDOPA AND LEVODOPA 2 TABLET: 25; 100 TABLET ORAL at 08:11

## 2019-11-13 RX ADMIN — Medication 2 SPRAY: at 08:11

## 2019-11-13 RX ADMIN — IPRATROPIUM BROMIDE AND ALBUTEROL SULFATE 3 ML: .5; 3 SOLUTION RESPIRATORY (INHALATION) at 07:11

## 2019-11-13 RX ADMIN — ALTEPLASE 2 MG: 2.2 INJECTION, POWDER, LYOPHILIZED, FOR SOLUTION INTRAVENOUS at 08:11

## 2019-11-13 RX ADMIN — AZITHROMYCIN MONOHYDRATE 500 MG: 500 INJECTION, POWDER, LYOPHILIZED, FOR SOLUTION INTRAVENOUS at 04:11

## 2019-11-13 RX ADMIN — FAMOTIDINE 20 MG: 20 TABLET ORAL at 08:11

## 2019-11-13 RX ADMIN — IPRATROPIUM BROMIDE AND ALBUTEROL SULFATE 3 ML: .5; 3 SOLUTION RESPIRATORY (INHALATION) at 01:11

## 2019-11-13 RX ADMIN — IPRATROPIUM BROMIDE AND ALBUTEROL SULFATE 3 ML: .5; 3 SOLUTION RESPIRATORY (INHALATION) at 08:11

## 2019-11-13 RX ADMIN — BRIMONIDINE TARTRATE 1 DROP: 1.5 SOLUTION OPHTHALMIC at 04:11

## 2019-11-13 RX ADMIN — BRIMONIDINE TARTRATE 1 DROP: 1.5 SOLUTION OPHTHALMIC at 08:11

## 2019-11-13 RX ADMIN — CEFTRIAXONE 1 G: 1 INJECTION, SOLUTION INTRAVENOUS at 01:11

## 2019-11-13 RX ADMIN — CEFTRIAXONE 1 G: 1 INJECTION, SOLUTION INTRAVENOUS at 12:11

## 2019-11-13 RX ADMIN — CARBIDOPA AND LEVODOPA 2 TABLET: 25; 100 TABLET ORAL at 04:11

## 2019-11-13 RX ADMIN — CARVEDILOL 6.25 MG: 6.25 TABLET, FILM COATED ORAL at 04:11

## 2019-11-13 NOTE — PHYSICIAN QUERY
PT Name: Shaq Huffman  MR #: [id number]    Physician Query Form - Atrial Fibrillation Specificity     CDS/: GERI Brumfield, RN, CDS               Contact information:     This form is a permanent document in the medical record.     Query Date: November 13, 2019    By submitting this query, we are merely seeking further clarification of documentation. Please utilize your independent clinical judgment when addressing the question(s) below.    The medical record contains the following:   Indicators     Supporting Clinical Findings Location in Medical Record   X Atrial Fibrillation  Atrial fibrillation    MARCO ANTONIO MCCAIN DNP, JUAN J/ Dr. Franco, 11/12   X EKG results  EKG impression: Atrial fibrillation  Incomplete right bundle branch block  Left anterior fascicular block  Septal infarct ,age undetermined    EKG 11/10   X Medication  Warfarin 2.5mg daily (home medication)   Coreg 6.25mg one twice a day (home medication)    H&P, KENIA Peterson NP/ Dr. Franco, 11/12   X Treatment -Currently rate controlled   - Continue home Coreg   - Resume Coumadin today       -Will discuss transitioning to Eliquis     MARCO ANTONIO MCCAIN DNP, JUAN J/ Dr. Franco, 11/12    Other         Provider, please further specify the Atrial Fibrillation diagnosis.    [ X ] Chronic   [  ] Other (please specify):   [  ] Clinically Undetermined       Please document in your progress notes daily for the duration of treatment until resolved, and include in your discharge summary.

## 2019-11-13 NOTE — PROGRESS NOTES
[facility] Hospital Medicine  Progress Note    Patient Name: Shaq Huffman  MRN: [id number]  Patient Class: IP- Inpatient   Admission Date: 11/10/2019  Length of Stay: 2 days  Attending Physician: Josesito Franco MD  Primary Care Provider: Provider Notinsystem        Subjective:     Principal Problem:Pneumonia of right lower lobe due to infectious organism        HPI:   Shaq Huffman is a 86 y.o. male patient with a h/o Parkinson's dz, COPD, and Atrial fibrillation who presents to the Emergency Department for evaluation of worsening confusion which onset gradually several days ago. Symptoms are constant and moderate in severity. No mitigating or exacerbating factors reported. Associated sxs include generalized weakness, cough  (productive), decreased appetite, malaise, and dark urine. Patient denies any fever, chills, abd pain, SOB, CP, frequency/urgency, and all other sxs at this time. No prior Tx reported. No further complaints or concerns at this time. Pt was hospitalized at [facility] from 10/29/19-11/7/19 and treated for acute respiratory failure and aspiration pneumonia.  Pt failed MBSS during recent admission and refused PEG placement at that time.  Pt was discharged to [facility] on Augmentin. Pt is a full code and Shabbir Huffman (son) at [phone] and Alina Atwood (significant other) at [phone] are the surrogate decision makers.  ER work up showed: Na 151, Cl 117, BUN 32, Alk Phos 181, Bili 1.7, and Albumin 2.5.   Chest xray showed increasing patchy density at the right base suspicious for pneumonia with stable consolidation medial right base.  CT of head showed no acute intracranial abnormality with atrophy present and old lacunar insult suspected on the right.  UA positive for infectious process.  Hospital Medicine contacted for admission to Observation Unit.     Overview/Hospital Course:  On 11/10 patient was placed in OBS secondary to Aspiration PNA, UTI, AMS, and Coumadin  toxicity with an initial INR of 7.0.  CT head showed no acute intracranial abnormality.  Atrophy is present.  Old lacunar insult suspected on the right.  Patient was recently hospitalized here for aspiration PNA requiring intubation and mechanical ventilation at that time.  After stabilization patient was discharged to [facility] for continued PT/OT/ST.  The patient was evaluated by ST on his last admission who recommended strict NPO given continued risk for aspiration pneumonia.  A PEG tube was recommended at that time, however, patient adamantly refused, and wanted to continue eating by mouth having understood the risk of oral intake.  BC were drawn and patient was started on IV antibiotics.  He was also given Vitamin K given active nose bleeding with elevated INR.    As of 11/11 Multiple acute events noted overnight including placement of an NGT.  Patient with worsening respiratory distress requiring increased amounts of oxygen, currently on ventimask with 15L O2.  Nose bleeding ceased after administration of vitamin K and INR has improved to 3.9.  Per d/w patient's son who is present at the , he is now a DNR.  Son agrees to conservative treatment of UTI and pneumonia however he does not want any resuscitative measures including no CPR, no emergency drugs, no defibrillation, and no intubation/mechanical ventilation.  Of note, son reports patient was recently diagnosed with Parkinson's within the past year and his significant other reports associated difficulty swallowing for the past 2-3 months.  Prior to his most recent hospitalization here patient was an active 86-year-old who traveled here from [place].  ST following and recommended strict NPO status.  Son states if patient stabilizes he would reconsider PEG placement.  Registered dietitian consulted for tube feeding recommendations and patient will be started on Isosource 1.5 at 60 cc/hour with 100 cc of free water q 4 hours.  Will also start gentle IVFs  with D5W at 50 cc/hour.  BC show NGTD.  UC pending.  Will plan to DC zuluaga cath in AM.  PT/OT following and recommended SNF versus rehab once medically stable.  Patient is currently in guarded condition.     As of 11/12 Patient's respiratory status has improved slightly from yesterday.  Still requiring high flow oxygen.  D/W RT who will attempt to wean as tolerated to NC.  Na remains elevated, will increase free H2O flushes to 200 cc q 4 hours and continue D5W IVFs.  BC and UC shows NGTD.  Will continue Rocephin and Azithromycin for PNA.  PT/OT/ST continues to follow.  Patient is severely deconditioned and remains in guarded condition.      As of 11/13 Patient is much more alert today and answering questions appropriately.  ST continues to follow and recommends strict NPO.  Discussed treatment options at length with patient and his son who is present at the , and patient is currently requesting PEG placement if possible.  Will consult GI for evaluation.  Continue IV ABX for PNA.  Plan to hold AC for now given possibility of PEG placement.  Patient and son agree to transitioning Coumadin to Eliquis after PEG placement.  Cardiology consult in AM for procedure clearance.    Interval History:  No acute events overnight.  Resting comfortably in bed with family at bedside.   Patient is much more alert today and answering questions appropriately.  ST continues to follow and recommends strict NPO.  Discussed treatment options at length with patient and his son who is present at the , and patient is currently requesting PEG placement if possible.  Will consult GI for evaluation.  Continue IV ABX for PNA.  Plan to hold AC for now given possibility of PEG placement.  Patient and son agree to transitioning Coumadin to Eliquis after PEG placement. Cardiology consult in AM for procedure clearance.      Review of Systems   Unable to perform ROS: Mental status change     Objective:     Vital Signs (Most Recent):  Temp: 97.5 °F  (36.4 °C) (11/13/19 1522)  Pulse: 89 (11/13/19 1522)  Resp: 19 (11/13/19 1522)  BP: 126/67 (11/13/19 1522)  SpO2: 95 % (11/13/19 1522) Vital Signs (24h Range):  Temp:  [96.1 °F (35.6 °C)-97.5 °F (36.4 °C)] 97.5 °F (36.4 °C)  Pulse:  [59-89] 89  Resp:  [17-24] 19  SpO2:  [93 %-99 %] 95 %  BP: ()/(55-68) 126/67       Weight: 68.8 kg (151 lb 10.8 oz)  Body mass index is 19.47 kg/m².    Intake/Output Summary (Last 24 hours) at 11/13/2019 1543  Last data filed at 11/13/2019 0800  Gross per 24 hour   Intake 2516 ml   Output 975 ml   Net 1541 ml      Physical Exam   Constitutional: He appears cachectic. He appears toxic. He appears ill.   HENT:   Head: Normocephalic.   Nose: Nose normal.   Mouth/Throat: Mucous membranes are dry. He has dentures.   Eyes: Conjunctivae are normal.   Neck: Normal range of motion. Neck supple.   Cardiovascular: Normal rate and intact distal pulses. An irregularly irregular rhythm present.   No murmur heard.  Pulmonary/Chest: Effort normal and breath sounds normal. No accessory muscle usage or stridor. No tachypnea. No respiratory distress. He has no wheezes. He has no rales.   Coarse throughout with scattered wheezing and rhonchi to all lobes.  Comfortable on 6L NC.  Loose, non-productive cough noted.   Abdominal: Soft. Bowel sounds are normal. He exhibits no distension. There is no tenderness.   Genitourinary:   Genitourinary Comments: Vallejo catheter present    Musculoskeletal:   Generalized weakness; LUE PICC site WNL with DSG CDI.   Neurological: He is alert. GCS eye subscore is 3. GCS verbal subscore is 4. GCS motor subscore is 6.   Awake and alert, oriented to person and place.   Skin: Skin is warm and dry.   Skin is thin with skin tears present to BUE   Psychiatric: He has a normal mood and affect.   Nursing note reviewed.      Significant Labs:   Blood Culture: No results for input(s): LABBLOO in the last 48 hours.  BMP:   Recent Labs   Lab 11/13/19  1325   *   *   K  "4.3   *   CO2 31*   BUN 34*   CREATININE 0.8   CALCIUM 8.6*     CBC:   Recent Labs   Lab 11/12/19  0647 11/13/19  0640   WBC 9.02 8.78   HGB 11.2* 11.0*   HCT 38.2* 36.5*    181     Urine Culture: No results for input(s): LABURIN in the last 48 hours.    Significant Imaging: I have reviewed all pertinent imaging results/findings within the past 24 hours.      Assessment/Plan:      * Pneumonia of right lower lobe due to infectious organism  - Placed in OBS and transitioned to inpatient  - Recently treated for aspiration PNA and discharged to [facility] on Augmentin   - Failed MBSS and refused PEG on last admission with pureed diet and honey thick liquids recommended   - BC show NGTD  - Continue IV ABX  - Supplemental O2  - Scheduled nebs  - Strict NPO  - Supportive care  - Patient is a DNR      Acute respiratory failure with hypoxia  - Multifactorial given aspiration PNA and severely deconditioned state  - Continue supplemental O2, will attempt to wean as tolerated  - Per son he is a DNR and does not want intubation/mechanical ventilation  - Treat PNA  - Scheduled nebs  - Supportive care  - Condition guarded      Altered mental status  - In the setting of PNA  - CT of head showed no acute intracranial abnormality and atrophy is present with old lacunar insult suspected on the right.  - Improving  - neuro checks   - Supportive care      Dysphagia  - Failed MBSS and refused PEG on last admission with pureed diet and honey thick liquids recommended   - Now with recurrent PNA, aware of the risks associated with continued oral intake and "wanted to try food" upon last DC  - ST following  - Strict NPO  - Continue TF via NGT  - Dietician consulted for TF recs  - HOB elevated >30 AAT  - Patient would like to proceed with PEG placement  - GI consulted for evaluation  - Hold AC for now pending ability to place PEG      Hypernatremia  - Slowly improving; likely secondary to dehydration/decreased oral intake  - " Continue free water flushes at 200 cc q 4 hours  - Continue gentle IVFs with D5W at 50 cc/hour  - Serial BMP to avoid precipitous drop      Dehydration  - Continue TF per dietician recs  - Free water flushes, 200 cc q 4 hours  - Gentle IVFs  - Monitor      Atrial fibrillation  - Currently rate controlled   - Continue home Coreg   - Detailed discussion held with patient and son who agree with stopping Coumadin and starting on Eliquis for CVA prophylaxis   - Hold AC for now given potential placement of PEG tube  - Will need to start Eliquis post PEG placement    Malnutrition of moderate degree  - Dietician following  - Continue TF and free water flushes per recs      Physical deconditioning  - pt discharged to [facility] on 11/7/19   - PT/OT consulted and recommended Rehab placement pending course and medical stability  - CM following for DC planning      Parkinson disease  - Continue home Sinemet   - Supportive care      Macrocytic anemia  - B12 [id number]   - Folate 7.7  - Hgb stable  - Daily CBC      Systolic CHF, chronic  - No known history prior to hospitalization in October  - ECHO on 10/30 showed EF of 35-40% with grade 3 diastolic dysfunction, and pulmonary hypertension with an estimated PA systolic pressure is greater than 52 mmHg.   - Currently appears compensated  - Continue BB  - Hold home Lasix given dehydration with need for gentle IVFs  - Cardiology consult in AM as patient will need clearance for PEG placement  - Tele monitoring      VTE Risk Mitigation (From admission, onward)    None   AC on hold pending possible PEG placement.             Hilda Bailey, DNP, ACNP-BC  Department of Hospital Medicine   [facility]

## 2019-11-13 NOTE — ASSESSMENT & PLAN NOTE
- No known history prior to hospitalization in October  - ECHO on 10/30 showed EF of 35-40% with grade 3 diastolic dysfunction, and pulmonary hypertension with an estimated PA systolic pressure is greater than 52 mmHg.   - Currently appears compensated  - Continue BB  - Hold home Lasix given dehydration with need for gentle IVFs  - Cardiology consult in AM as patient will need clearance for PEG placement  - Tele monitoring

## 2019-11-13 NOTE — ASSESSMENT & PLAN NOTE
- Slowly improving; likely secondary to dehydration/decreased oral intake  - Continue free water flushes at 200 cc q 4 hours  - Continue gentle IVFs with D5W at 50 cc/hour  - Serial BMP to avoid precipitous drop

## 2019-11-13 NOTE — PROGRESS NOTES
Clinical Pharmacy Sign-Off Note: Coumadin Dosing and Monitoring     Therapy with warfarin complete and/or consult discontinued by provider.  Pharmacy will sign off, please re-consult as needed.    Thank you for allowing us to participate in this patient's care.     Vivian Richards PharmZAHEER 11/13/2019 12:24 PM

## 2019-11-13 NOTE — PT/OT/SLP PROGRESS
Physical Therapy  Treatment    Shaq Huffman   MRN: [id number]   Admitting Diagnosis: Pneumonia of right lower lobe due to infectious organism    PT Received On: 11/13/19  PT Start Time: 1330     PT Stop Time: 1355    PT Total Time (min): 25 min       Billable Minutes:  Gait Training 8 and Therapeutic Activity 17    Treatment Type: Treatment  PT/PTA: PTA     PTA Visit Number: 1       General Precautions: Standard, aspiration, NPO, respiratory  Orthopedic Precautions: N/A   Braces: N/A         Subjective:  Communicated with epic and nurse: Mayank prior to session.  Pt was agreeable to tx. One on one sitter in room and girlfriend.     Pain/Comfort  Pain Rating 1: 0/10    Objective:   Patient found with: bed alarm, peripheral IV, NG tube, oxygen, zuluaga catheter    Functional Mobility:  Bed Mobility:     Supine to Sit: min a   Sit to supine: min a   Seated scooting : cga  ( to the edge of bed)     Transfers:    Sit to stand: min a  (to a RW)   Sit to stand: min a    Gait:     Took 3-4 steps fwd (away from the bed) then back. [place] too weak and sob to go  further. Kyphotic posture. Shuffled steps. Decreased balance.         Therapeutic Activities and Exercises:  Sat eob with F static balance x 10 min. Worked on static standing with the RW with CGA.     AM-PAC 6 CLICK MOBILITY  How much help from another person does this patient currently need?   1 = Unable, Total/Dependent Assistance  2 = A lot, Maximum/Moderate Assistance  3 = A little, Minimum/Contact Guard/Supervision  4 = None, Modified Morgan/Independent    Turning over in bed (including adjusting bedclothes, sheets and blankets)?: 3  Sitting down on and standing up from a chair with arms (e.g., wheelchair, bedside commode, etc.): 3  Moving from lying on back to sitting on the side of the bed?: 2  Moving to and from a bed to a chair (including a wheelchair)?: 3  Need to walk in hospital room?: 3  Climbing 3-5 steps with a railing?: 1  Basic Mobility Total  Score: 15    AM-PAC Raw Score CMS G-Code Modifier Level of Impairment Assistance   6 % Total / Unable   7 - 9 CM 80 - 100% Maximal Assist   10 - 14 CL 60 - 80% Moderate Assist   15 - 19 CK 40 - 60% Moderate Assist   20 - 22 CJ 20 - 40% Minimal Assist   23 CI 1-20% SBA / CGA   24 CH 0% Independent/ Mod I     Patient left HOB elevated with all lines intact, call button in reach and sitter present.    Assessment:  Shaq Huffman is a 86 y.o. male with a medical diagnosis of Pneumonia of right lower lobe due to infectious organism and presents with impaired activity tolerance, still has the NG tube but will get a PEG soon.  Continues to be cooperative and motivated. Great potential to make practical and functional gains. Currently not tolerating a lot of activity.     Rehab identified problem list/impairments: Rehab identified problem list/impairments: weakness, impaired endurance, impaired self care skills, impaired functional mobilty, gait instability, impaired balance, decreased coordination, decreased safety awareness, impaired fine motor, impaired cardiopulmonary response to activity    Rehab potential is fair.    Activity tolerance: Fair    Discharge recommendations: Discharge Facility/Level of Care Needs: nursing facility, skilled     Barriers to discharge:      Equipment recommendations: Equipment Needed After Discharge: walker, rolling     GOALS:   Multidisciplinary Problems     Physical Therapy Goals        Problem: Physical Therapy Goal    Goal Priority Disciplines Outcome Goal Variances Interventions   Physical Therapy Goal     PT, PT/OT Ongoing, Progressing     Description:  In one wek(11/18)pt will:  1. Go sit to/from supine with CGA  2. Go sit to stand with CGA  3. Ambulate with RW 50 ft with Melani  4. SPT with Melani                    PLAN:    Patient to be seen 5 x/week  to address the above listed problems via gait training, therapeutic activities, therapeutic exercises  Plan of Care expires:  11/19/19  Plan of Care reviewed with: patient         Yenifer Smallwood, PTA  11/13/2019

## 2019-11-13 NOTE — SUBJECTIVE & OBJECTIVE
Interval History:  No acute events overnight.  Resting comfortably in bed with family at bedside.   Patient is much more alert today and answering questions appropriately.  ST continues to follow and recommends strict NPO.  Discussed treatment options at length with patient and his son who is present at the BS, and patient is currently requesting PEG placement if possible.  Will consult GI for evaluation.  Continue IV ABX for PNA.  Plan to hold AC for now given possibility of PEG placement.  Patient and son agree to transitioning Coumadin to Eliquis after PEG placement. Cardiology consult in AM for procedure clearance.      Review of Systems   Unable to perform ROS: Mental status change     Objective:     Vital Signs (Most Recent):  Temp: 97.5 °F (36.4 °C) (11/13/19 1522)  Pulse: 89 (11/13/19 1522)  Resp: 19 (11/13/19 1522)  BP: 126/67 (11/13/19 1522)  SpO2: 95 % (11/13/19 1522) Vital Signs (24h Range):  Temp:  [96.1 °F (35.6 °C)-97.5 °F (36.4 °C)] 97.5 °F (36.4 °C)  Pulse:  [59-89] 89  Resp:  [17-24] 19  SpO2:  [93 %-99 %] 95 %  BP: ()/(55-68) 126/67       Weight: 68.8 kg (151 lb 10.8 oz)  Body mass index is 19.47 kg/m².    Intake/Output Summary (Last 24 hours) at 11/13/2019 1543  Last data filed at 11/13/2019 0800  Gross per 24 hour   Intake 2516 ml   Output 975 ml   Net 1541 ml      Physical Exam   Constitutional: He appears cachectic. He appears toxic. He appears ill.   HENT:   Head: Normocephalic.   Nose: Nose normal.   Mouth/Throat: Mucous membranes are dry. He has dentures.   Eyes: Conjunctivae are normal.   Neck: Normal range of motion. Neck supple.   Cardiovascular: Normal rate and intact distal pulses. An irregularly irregular rhythm present.   No murmur heard.  Pulmonary/Chest: Effort normal and breath sounds normal. No accessory muscle usage or stridor. No tachypnea. No respiratory distress. He has no wheezes. He has no rales.   Coarse throughout with scattered wheezing and rhonchi to all lobes.   Comfortable on 6L NC.  Loose, non-productive cough noted.   Abdominal: Soft. Bowel sounds are normal. He exhibits no distension. There is no tenderness.   Genitourinary:   Genitourinary Comments: Vallejo catheter present    Musculoskeletal:   Generalized weakness; LUE PICC site WNL with DSG CDI.   Neurological: He is alert. GCS eye subscore is 3. GCS verbal subscore is 4. GCS motor subscore is 6.   Awake and alert, oriented to person and place.   Skin: Skin is warm and dry.   Skin is thin with skin tears present to BUE   Psychiatric: He has a normal mood and affect.   Nursing note reviewed.      Significant Labs:   Blood Culture: No results for input(s): LABBLOO in the last 48 hours.  BMP:   Recent Labs   Lab 11/13/19  1325   *   *   K 4.3   *   CO2 31*   BUN 34*   CREATININE 0.8   CALCIUM 8.6*     CBC:   Recent Labs   Lab 11/12/19  0647 11/13/19  0640   WBC 9.02 8.78   HGB 11.2* 11.0*   HCT 38.2* 36.5*    181     Urine Culture: No results for input(s): LABURIN in the last 48 hours.    Significant Imaging: I have reviewed all pertinent imaging results/findings within the past 24 hours.

## 2019-11-13 NOTE — SUBJECTIVE & OBJECTIVE
Past Medical History:   Diagnosis Date    A-fib     COPD (chronic obstructive pulmonary disease)     UTI (urinary tract infection) 11/10/2019       History reviewed. No pertinent surgical history.    Review of patient's allergies indicates:  No Known Allergies  Family History     None        Tobacco Use    Smoking status: Former Smoker   Substance and Sexual Activity    Alcohol use: Not on file    Drug use: Not on file    Sexual activity: Not on file     Review of Systems   Constitutional: Positive for fatigue.   HENT: Positive for trouble swallowing (chronic over past few months).    Respiratory: Positive for cough and shortness of breath.    Cardiovascular: Negative for chest pain.   Gastrointestinal: Negative for abdominal pain, blood in stool, constipation, diarrhea, nausea and vomiting.   Genitourinary: Positive for decreased urine volume.        Urine output decreased   Skin: Negative for rash and wound.   Neurological: Positive for weakness.   Psychiatric/Behavioral: Negative for agitation. The patient is not nervous/anxious.    Obtained from patient and wife.  Objective:     Vital Signs (Most Recent):  Temp: 97 °F (36.1 °C) (11/13/19 1238)  Pulse: 78 (11/13/19 1301)  Resp: (!) 24 (11/13/19 1300)  BP: 105/68 (11/13/19 1238)  SpO2: 96 % (11/13/19 1300) Vital Signs (24h Range):  Temp:  [96.1 °F (35.6 °C)-97 °F (36.1 °C)] 97 °F (36.1 °C)  Pulse:  [59-89] 78  Resp:  [17-24] 24  SpO2:  [93 %-99 %] 96 %  BP: ()/(55-68) 105/68     Weight: 68.8 kg (151 lb 10.8 oz) (11/13/19 0656)  Body mass index is 19.47 kg/m².      Intake/Output Summary (Last 24 hours) at 11/13/2019 1503  Last data filed at 11/13/2019 0800  Gross per 24 hour   Intake 2516 ml   Output 975 ml   Net 1541 ml       Lines/Drains/Airways     Peripherally Inserted Central Catheter Line                 PICC Double Lumen 10/30/19 0445 left brachial 14 days          Drain                 Urethral Catheter 10/29/19 1440 16 Fr. 15 days          NG/OG Tube 11/10/19 2300 Right nostril 2 days          Peripheral Intravenous Line                 Peripheral IV - Single Lumen 11/10/19 0755 20 G Right Wrist 3 days                Physical Exam   Constitutional: He appears cachectic. He has a sickly appearance. He appears ill. Nasal cannula in place.   HENT:   Head: Normocephalic and atraumatic.   Right Ear: External ear normal.   Left Ear: External ear normal.   Nose: Nose normal.   Eyes: Conjunctivae are normal. Right eye exhibits no discharge. Left eye exhibits no discharge.   Neck: Normal range of motion.   Cardiovascular: Normal rate, regular rhythm, normal heart sounds and intact distal pulses.   Pulmonary/Chest: He is in respiratory distress (mild distress). He has wheezes (inspiratory wheezins noted to bilateral lung bases). He exhibits no tenderness.   Short episode of apnea during auscultation. Approximately 5 seconds. Began talking loudly and patient resumed respirations. Was responsive. Notified nurse.   Abdominal: Soft. Bowel sounds are normal. He exhibits no distension. There is no tenderness. There is no guarding.   Neurological:   Answering some questions but not all.   Skin: Skin is warm and dry. Capillary refill takes 2 to 3 seconds. He is not diaphoretic.       Significant Labs:  CBC:   Recent Labs   Lab 11/12/19  0647 11/13/19  0640   WBC 9.02 8.78   HGB 11.2* 11.0*   HCT 38.2* 36.5*    181     CMP:   Recent Labs   Lab 11/12/19  0647  11/13/19  1325   *  135*   < > 119*   CALCIUM 8.4*  8.4*   < > 8.6*   ALBUMIN 2.2*  --   --    PROT 5.6*  --   --    *  152*   < > 147*   K 4.0  4.0   < > 4.3   CO2 30*  30*   < > 31*   *  114*   < > 111*   BUN 44*  44*   < > 34*   CREATININE 0.9  0.9   < > 0.8   ALKPHOS 161*  --   --    ALT 5*  --   --    AST 31  --   --    BILITOT 1.0  --   --     < > = values in this interval not displayed.     Coagulation:   Recent Labs   Lab 11/13/19  0640   INR 1.3*       Significant  Imaging:  Imaging results within the past 24 hours have been reviewed.

## 2019-11-13 NOTE — PHYSICIAN QUERY
PT Name: Shaq Huffman  MR #: [id number]    Physician Query Form - Cause and Effect Relationship Clarification      CDS/: GERI Brumfield, RN, CDS               Contact information:[email or web address]     This form is a permanent document in the medical record.     Query Date: November 13, 2019    By submitting this query, we are merely seeking further clarification of documentation. Please utilize your independent clinical judgment when addressing the question(s) below.    The Medical record contains the following:  Supporting Clinical Findings   Location in record    prolonged urinary catheter with nitrite positive urine                         Zuluaga catheter present              UTI (urinary tract infection)  - UA consistent with infectious process            Plan to DC zuluaga in AM                                                                                 ED note, Dr. Breann Jr., 11/10     H&P, KENIA Peterson NP/ Dr. Franco, 11/10     , MARCO ANTONIO Bailey DNP, ACNP-BC/ Dr. Franco, 11/11         Provider, please clarify if there is any correlation between UTI and Indwelling urinary catheter.           Are the conditions:      [  ] Due to or associated with each other   [  ] Unrelated to each other   [  ] Other (Please Specify): _________________________   [ x ] Clinically Undetermined

## 2019-11-13 NOTE — HPI
Patient is an 87yo male with history of Afib, Parkinsons, and COPD. Patient was admitted to [facility] 10/29 with aspiration penumonia after flying to  from [place] for a river cruise. He had been struggling with dysphagia for some time, but symptoms worsened after arriving in [place]. He denied PEG tube placement at this time. He was stabilized and discharged on 11/7 to [place] where he remained for 3 days until he returned to our ER on 11/10 for altered mental status. Patient had UTI and pneumonia and was placed on Rocephin and Azithromycin. Patient was on VentiMask, but currently on 6L nasal cannula. He is now requesting PEG tube placement. He is currently getting NGT feeds which he is tolerating well. Initial INR this visit was 7.0, now 1.3. Coumadin for Afib has been held. Patient denies any pain, but complains of fatigue.

## 2019-11-13 NOTE — ASSESSMENT & PLAN NOTE
-Patient now requesting PEG tube placement. GI willing to place with clearance of patient for sedation by HM and discussion of risk vs. benefit with patient and family.  -Possibility of IR placement.  -GI will continue to follow.  -Continue with NGT feeds until PEG placement.

## 2019-11-13 NOTE — PLAN OF CARE
"Plan reviewed. Verbalized understanding. Pt became more alert and verbally responsive during shift.  Pt only disoriented to place. Pt stated "I need to get out of here so I can go die". On call team made aware of this discussion with pt. No falls noted. Siderails up x2, bed low/locked, call light within easy reach. HR 83 afib on monitor. VSS. IV/PICC patent and intact. Hourly rounding complete. Reminded to call for assistance. Will continue to monitor.   "

## 2019-11-13 NOTE — PHYSICIAN QUERY
PT Name: Shaq Huffman  MR #: [id number]     CDS/: GERI Brumfield, RN, CDS               Contact information:[email or web address]  This form is a permanent document in the medical record.     Query Date: November 13, 2019    Physician Query - Neurological Condition Clarification    By submitting this query, we are merely seeking further clarification of documentation to reflect the severity of illness of your patient. Please utilize your independent clinical judgment when addressing the question(s) below.    The Medical record reflects the following:     Indicators   Supporting Clinical Findings Location in Medical Record   X AMS, Confusion,  LOC, etc.   worsening confusion which onset gradually several days ago     Positive for confusion.      Altered mental status  -in the setting of UTI and Parkinsons dx     Unable to perform ROS: Mental status change      He appears lethargic  H&P, KENIA Peterson NP/ Dr. Franco, 11/10               , MARCO ANTONIO Bailey DNP, JUAN J/ Dr. Franco, 11/11   X Acute / Chronic Illness  UTI, Pneumonia, Atrial fibrillation, Parkinson disease, Coumadin toxicity      Acute respiratory failure with hypoxia, Macroctic anemia, Hypernatremia, Dehydration    H&P, KENIA Peterson NP/ Dr. Franco, 11/10     , MARCO ANTONIO Bailey DNP, JUAN J/ Dr. Franco, 11/11   X Radiology Findings  CT of head showed no acute intracranial abnormality and atrophy is present with old lacunar insult suspected on the right.    H&PKENIA NP/ Dr. Franco, 11/10   X Electrolyte Imbalance  Hypernatremia  - Na elevated to 155 and likely secondary to dehydration/decreased oral intake    H&P, KENIA Peterson NP/ Dr. Franco, 11/10    Medication     X Treatment          Ceftriaxone 1 g IV every 12 hours     Vancomycin 2 g IV x 1 in ED     Zosyn 4.5 g IV x 1 in ED     sodium chloride 0.9% 500ml IV in ED     Azithromycin 500mg IV every 24 hours     - neuro checks   - Supportive care  MAR 11/10     MAR 11/10     MAR 11/10      MAR 11/10     MAR 11/11     MARCO ANTONIO CMCAIN, CHIDI, [facility]-BC/ Dr. Franco, 11/11    Other       Encephalopathy- is a general term for any diffuse disease of the brain that alters brain function or structure. Treatment of the cognitive dysfunction varies but is ultimately dependent on the treatment of the underlying condition.    Major Symptoms of Encephalopathy - Decreased level of consciousness, fluctuating alertness/concentration, confusion, agitation, lethargy, somnolence, drowsiness, obtundation, stupor, or coma.         References: National Institutes of Healths (NIH) National [place] of Neurological Disorders and Strokes;  HCPro 2016; Advisory Board     Clinical Guidelines:   These guidelines will set system standards to assist providers in managing, documentation, and coding of encephalopathy. The intent of this document is to serve as a system guideline, not replace the providers clinical judgment:  Provider, please specify the diagnosis or diagnoses associated with above clinical findings.      [  X ] Metabolic Encephalopathy - Due to electrolye imbalance, metabolic derangements, or infections processes, includes Septic Encephalopathy   [   ] Other Encephalopathy - Includes uremic encephalopathy   [   ] Unspecified Encephalopathy      [   ] Other Neurological Condition-  Includes Post-ictal altered mental status. (please specify condition): _________   [   ]  Clinically Undetermined     Please document in your progress notes daily for the duration of treatment until resolved, and include in your discharge summary.

## 2019-11-13 NOTE — ASSESSMENT & PLAN NOTE
- Currently rate controlled   - Continue home Coreg   - Detailed discussion held with patient and son who agree with stopping Coumadin and starting on Eliquis for CVA prophylaxis   - Hold AC for now given potential placement of PEG tube  - Will need to start Eliquis post PEG placement

## 2019-11-13 NOTE — ASSESSMENT & PLAN NOTE
"- Failed MBSS and refused PEG on last admission with pureed diet and honey thick liquids recommended   - Now with recurrent PNA, aware of the risks associated with continued oral intake and "wanted to try food" upon last DC  - ST following  - Strict NPO  - Continue TF via NGT  - Dietician consulted for TF recs  - HOB elevated >30 AAT  - Patient would like to proceed with PEG placement  - GI consulted for evaluation  - Hold AC for now pending ability to place PEG    "

## 2019-11-13 NOTE — CONSULTS
[facility]  Gastroenterology  Consult Note    Patient Name: Shaq Huffman  MRN: [id number]  Admission Date: 11/10/2019  Hospital Length of Stay: 2 days  Code Status: DNR   Attending Provider: Josesito Franco MD   Consulting Provider: Shelly Christensen PA-C  Primary Care Physician: Provider Notinsystem  Principal Problem:Pneumonia of right lower lobe due to infectious organism    Inpatient consult to Gastroenterology  Consult performed by: Shelly Christensen PA-C  Consult ordered by: Hilda Bailey NP  Reason for consult: PEG placement        Subjective:     HPI:  Patient is an 87yo male with history of Afib, Parkinsons, and COPD. Patient was admitted to [facility] 10/29 with aspiration penumonia after flying to  from [place] for a river cruise. He had been struggling with dysphagia for some time, but symptoms worsened after arriving in [place]. He denied PEG tube placement at this time. He was stabilized and discharged on 11/7 to [place] where he remained for 3 days until he returned to our ER on 11/10 for altered mental status. Patient had UTI and pneumonia and was placed on Rocephin and Azithromycin. Patient was on VentiMask, but currently on 6L nasal cannula. He is now requesting PEG tube placement. He is currently getting NGT feeds which he is tolerating well. Initial INR this visit was 7.0, now 1.3. Coumadin for Afib has been held. Patient denies any pain, but complains of fatigue.     Past Medical History:   Diagnosis Date    A-fib     COPD (chronic obstructive pulmonary disease)     UTI (urinary tract infection) 11/10/2019       History reviewed. No pertinent surgical history.    Review of patient's allergies indicates:  No Known Allergies  Family History     None        Tobacco Use    Smoking status: Former Smoker   Substance and Sexual Activity    Alcohol use: Not on file    Drug use: Not on file    Sexual activity: Not on file     Review of Systems   Constitutional:  Positive for fatigue.   HENT: Positive for trouble swallowing (chronic over past few months).    Respiratory: Positive for cough and shortness of breath.    Cardiovascular: Negative for chest pain.   Gastrointestinal: Negative for abdominal pain, blood in stool, constipation, diarrhea, nausea and vomiting.   Genitourinary: Positive for decreased urine volume.        Urine output decreased   Skin: Negative for rash and wound.   Neurological: Positive for weakness.   Psychiatric/Behavioral: Negative for agitation. The patient is not nervous/anxious.    Obtained from patient and wife.  Objective:     Vital Signs (Most Recent):  Temp: 97 °F (36.1 °C) (11/13/19 1238)  Pulse: 78 (11/13/19 1301)  Resp: (!) 24 (11/13/19 1300)  BP: 105/68 (11/13/19 1238)  SpO2: 96 % (11/13/19 1300) Vital Signs (24h Range):  Temp:  [96.1 °F (35.6 °C)-97 °F (36.1 °C)] 97 °F (36.1 °C)  Pulse:  [59-89] 78  Resp:  [17-24] 24  SpO2:  [93 %-99 %] 96 %  BP: ()/(55-68) 105/68     Weight: 68.8 kg (151 lb 10.8 oz) (11/13/19 0656)  Body mass index is 19.47 kg/m².      Intake/Output Summary (Last 24 hours) at 11/13/2019 1503  Last data filed at 11/13/2019 0800  Gross per 24 hour   Intake 2516 ml   Output 975 ml   Net 1541 ml       Lines/Drains/Airways     Peripherally Inserted Central Catheter Line                 PICC Double Lumen 10/30/19 0445 left brachial 14 days          Drain                 Urethral Catheter 10/29/19 1440 16 Fr. 15 days         NG/OG Tube 11/10/19 2300 Right nostril 2 days          Peripheral Intravenous Line                 Peripheral IV - Single Lumen 11/10/19 0755 20 G Right Wrist 3 days                Physical Exam   Constitutional: He appears cachectic. He has a sickly appearance. He appears ill. Nasal cannula in place.   HENT:   Head: Normocephalic and atraumatic.   Right Ear: External ear normal.   Left Ear: External ear normal.   Nose: Nose normal.   Eyes: Conjunctivae are normal. Right eye exhibits no discharge.  Left eye exhibits no discharge.   Neck: Normal range of motion.   Cardiovascular: Normal rate, regular rhythm, normal heart sounds and intact distal pulses.   Pulmonary/Chest: He is in respiratory distress (mild distress). He has wheezes (inspiratory wheezins noted to bilateral lung bases). He exhibits no tenderness.   Short episode of apnea during auscultation. Approximately 5 seconds. Began talking loudly and patient resumed respirations. Was responsive. Notified nurse.   Abdominal: Soft. Bowel sounds are normal. He exhibits no distension. There is no tenderness. There is no guarding.   Neurological:   Answering some questions but not all.   Skin: Skin is warm and dry. Capillary refill takes 2 to 3 seconds. He is not diaphoretic.       Significant Labs:  CBC:   Recent Labs   Lab 11/12/19  0647 11/13/19  0640   WBC 9.02 8.78   HGB 11.2* 11.0*   HCT 38.2* 36.5*    181     CMP:   Recent Labs   Lab 11/12/19  0647  11/13/19  1325   *  135*   < > 119*   CALCIUM 8.4*  8.4*   < > 8.6*   ALBUMIN 2.2*  --   --    PROT 5.6*  --   --    *  152*   < > 147*   K 4.0  4.0   < > 4.3   CO2 30*  30*   < > 31*   *  114*   < > 111*   BUN 44*  44*   < > 34*   CREATININE 0.9  0.9   < > 0.8   ALKPHOS 161*  --   --    ALT 5*  --   --    AST 31  --   --    BILITOT 1.0  --   --     < > = values in this interval not displayed.     Coagulation:   Recent Labs   Lab 11/13/19  0640   INR 1.3*       Significant Imaging:  Imaging results within the past 24 hours have been reviewed.    Assessment/Plan:     * Pneumonia of right lower lobe due to infectious organism  -Continue with abx per     Supratherapeutic INR  -Continue to monitor INR.  -Most recent INR down to 1.3    Malnutrition of moderate degree  -Patient now requesting PEG tube placement. GI willing to place with clearance of patient for sedation by  and discussion of risk vs. benefit with patient and family.  -Possibility of IR placement.  -GI will  continue to follow.  -Continue with NGT feeds until PEG placement.    Parkinson disease  -per         Thank you for your consult. I will follow-up with patient. Please contact us if you have any additional questions.    Shelly Christensen PA-C  Gastroenterology  [facility]

## 2019-11-13 NOTE — ASSESSMENT & PLAN NOTE
- Placed in OBS and transitioned to inpatient  - Recently treated for aspiration PNA and discharged to [facility] on Augmentin   - Failed MBSS and refused PEG on last admission with pureed diet and honey thick liquids recommended   - BC show NGTD  - Continue IV ABX  - Supplemental O2  - Scheduled nebs  - Strict NPO  - Supportive care  - Patient is a DNR

## 2019-11-13 NOTE — PT/OT/SLP PROGRESS
Speech Language Pathology Treatment    Patient Name:  Shaq Huffman   MRN:  [id number]  Admitting Diagnosis: Pneumonia of right lower lobe due to infectious organism    Recommendations:                 General Recommendations:  Dysphagia therapy  Diet recommendations:  NPO, Liquid Diet Level: NPO   Aspiration Precautions:    General Precautions: Standard, aspiration, fall, respiratory  Communication strategies:  provide increased time to answer    Subjective     Pt much improved today. He is more alert and responsive with good recall.  Patient goals: to go home and eat    Pain/Comfort:  · Pain Rating 1: 0/10  · Pain Rating Post-Intervention 1: 0/10    Objective:     Has the patient been evaluated by SLP for swallowing?   Yes  Keep patient NPO? Yes   Current Respiratory Status: nasal cannula     Pt completed oral motor, tongue base retraction, laryngeal elevation and adduction ex x 20 each with min cues to improve speech and swallow function. He tolerated oral stim via toothette. Pt with markedly improved speech and loudness at end of session. Pt and fly educated on purpose of ex and POC if PEG is placed. He expressed understanding and had no further questions.       Assessment:     Shaq Huffman is a 86 y.o. male with an SLP diagnosis of Dysphagia.  He presents with improved level of alertness and strength today. Recommend he remain NPO with ongoing speech therapy for dysphagia.    Goals:   Multidisciplinary Problems     SLP Goals        Problem: SLP Goal    Goal Priority Disciplines Outcome   SLP Goal     SLP Ongoing, Progressing   Description:  LTG:  Pt will tolerate least restrictive diet consistency safely and efficiently.       ST. Oral motor tasks x 20 with cues             2. Oral Stim x20 with 100% timely laryngeal excursions             3. TBR and LE x20              4. Repeat MBSS if indicated                    Plan:     · Patient to be seen:  3 x/week   · Plan of Care expires:     · Plan of Care  reviewed with:  patient   · SLP Follow-Up:  Yes       Discharge recommendations:      Barriers to Discharge:  None    Time Tracking:     SLP Treatment Date:   11/13/19  Speech Start Time:  0901  Speech Stop Time:  0925     Speech Total Time (min):  24 min    Billable Minutes: Treatment Swallowing Dysfunction 24    Danielle Willis CCC-SLP  11/13/2019

## 2019-11-13 NOTE — PLAN OF CARE
Pt required MIN A with bed moblity and sit to stand. Took a few steps away from the bed with a RW, min a.

## 2019-11-13 NOTE — ASSESSMENT & PLAN NOTE
- In the setting of PNA  - CT of head showed no acute intracranial abnormality and atrophy is present with old lacunar insult suspected on the right.  - Improving  - neuro checks   - Supportive care

## 2019-11-13 NOTE — PLAN OF CARE
Ongoing (interventions implemented as appropriate)  Pt oriented to self.  VSS  Pt remained afebrile throughout this shift.  Pt remained free of falls this shift.   Plan of care reviewed. Family verbalizes understanding.   Pt moving/turing independent. Frequent weight shifting encouraged.  Patient Afib on monitor.   Bed low, side rails up x 2, wheels locked, call light in reach.   Hourly rounding completed.   Will continue to monitor.

## 2019-11-13 NOTE — ASSESSMENT & PLAN NOTE
- pt discharged to [facility] on 11/7/19   - PT/OT consulted and recommended Rehab placement pending course and medical stability  - CM following for DC planning

## 2019-11-14 LAB
ANION GAP SERPL CALC-SCNC: 3 MMOL/L (ref 8–16)
ANION GAP SERPL CALC-SCNC: 3 MMOL/L (ref 8–16)
ANION GAP SERPL CALC-SCNC: 4 MMOL/L (ref 8–16)
ANION GAP SERPL CALC-SCNC: 4 MMOL/L (ref 8–16)
BASOPHILS # BLD AUTO: 0.04 K/UL (ref 0–0.2)
BASOPHILS NFR BLD: 0.6 % (ref 0–1.9)
BUN SERPL-MCNC: 32 MG/DL (ref 8–23)
BUN SERPL-MCNC: 33 MG/DL (ref 8–23)
BUN SERPL-MCNC: 33 MG/DL (ref 8–23)
BUN SERPL-MCNC: 35 MG/DL (ref 8–23)
CALCIUM SERPL-MCNC: 8.2 MG/DL (ref 8.7–10.5)
CALCIUM SERPL-MCNC: 8.3 MG/DL (ref 8.7–10.5)
CALCIUM SERPL-MCNC: 8.5 MG/DL (ref 8.7–10.5)
CALCIUM SERPL-MCNC: 8.7 MG/DL (ref 8.7–10.5)
CHLORIDE SERPL-SCNC: 105 MMOL/L (ref 95–110)
CHLORIDE SERPL-SCNC: 107 MMOL/L (ref 95–110)
CHLORIDE SERPL-SCNC: 108 MMOL/L (ref 95–110)
CHLORIDE SERPL-SCNC: 109 MMOL/L (ref 95–110)
CO2 SERPL-SCNC: 31 MMOL/L (ref 23–29)
CO2 SERPL-SCNC: 34 MMOL/L (ref 23–29)
CREAT SERPL-MCNC: 0.7 MG/DL (ref 0.5–1.4)
CREAT SERPL-MCNC: 0.8 MG/DL (ref 0.5–1.4)
DIFFERENTIAL METHOD: ABNORMAL
EOSINOPHIL # BLD AUTO: 0.4 K/UL (ref 0–0.5)
EOSINOPHIL NFR BLD: 5 % (ref 0–8)
ERYTHROCYTE [DISTWIDTH] IN BLOOD BY AUTOMATED COUNT: 13.9 % (ref 11.5–14.5)
EST. GFR  (AFRICAN AMERICAN): >60 ML/MIN/1.73 M^2
EST. GFR  (NON AFRICAN AMERICAN): >60 ML/MIN/1.73 M^2
GLUCOSE SERPL-MCNC: 120 MG/DL (ref 70–110)
GLUCOSE SERPL-MCNC: 122 MG/DL (ref 70–110)
GLUCOSE SERPL-MCNC: 124 MG/DL (ref 70–110)
GLUCOSE SERPL-MCNC: 131 MG/DL (ref 70–110)
HCT VFR BLD AUTO: 35.4 % (ref 40–54)
HGB BLD-MCNC: 10.4 G/DL (ref 14–18)
IMM GRANULOCYTES # BLD AUTO: 0.04 K/UL (ref 0–0.04)
IMM GRANULOCYTES NFR BLD AUTO: 0.6 % (ref 0–0.5)
INR PPP: 1.2 (ref 0.8–1.2)
LYMPHOCYTES # BLD AUTO: 0.6 K/UL (ref 1–4.8)
LYMPHOCYTES NFR BLD: 8.8 % (ref 18–48)
MCH RBC QN AUTO: 32 PG (ref 27–31)
MCHC RBC AUTO-ENTMCNC: 29.4 G/DL (ref 32–36)
MCV RBC AUTO: 109 FL (ref 82–98)
MONOCYTES # BLD AUTO: 0.7 K/UL (ref 0.3–1)
MONOCYTES NFR BLD: 9 % (ref 4–15)
NEUTROPHILS # BLD AUTO: 5.5 K/UL (ref 1.8–7.7)
NEUTROPHILS NFR BLD: 76 % (ref 38–73)
NRBC BLD-RTO: 0 /100 WBC
PLATELET # BLD AUTO: 166 K/UL (ref 150–350)
PMV BLD AUTO: 10.2 FL (ref 9.2–12.9)
POCT GLUCOSE: 111 MG/DL (ref 70–110)
POCT GLUCOSE: 112 MG/DL (ref 70–110)
POCT GLUCOSE: 115 MG/DL (ref 70–110)
POCT GLUCOSE: 125 MG/DL (ref 70–110)
POTASSIUM SERPL-SCNC: 4.1 MMOL/L (ref 3.5–5.1)
POTASSIUM SERPL-SCNC: 4.4 MMOL/L (ref 3.5–5.1)
POTASSIUM SERPL-SCNC: 4.5 MMOL/L (ref 3.5–5.1)
POTASSIUM SERPL-SCNC: 5.2 MMOL/L (ref 3.5–5.1)
PROTHROMBIN TIME: 12.7 SEC (ref 9–12.5)
RBC # BLD AUTO: 3.25 M/UL (ref 4.6–6.2)
SODIUM SERPL-SCNC: 142 MMOL/L (ref 136–145)
SODIUM SERPL-SCNC: 142 MMOL/L (ref 136–145)
SODIUM SERPL-SCNC: 143 MMOL/L (ref 136–145)
SODIUM SERPL-SCNC: 143 MMOL/L (ref 136–145)
WBC # BLD AUTO: 7.26 K/UL (ref 3.9–12.7)

## 2019-11-14 PROCEDURE — 97803 MED NUTRITION INDIV SUBSEQ: CPT

## 2019-11-14 PROCEDURE — 25000242 PHARM REV CODE 250 ALT 637 W/ HCPCS: Performed by: NURSE PRACTITIONER

## 2019-11-14 PROCEDURE — 27000221 HC OXYGEN, UP TO 24 HOURS

## 2019-11-14 PROCEDURE — 85025 COMPLETE CBC W/AUTO DIFF WBC: CPT

## 2019-11-14 PROCEDURE — 85610 PROTHROMBIN TIME: CPT

## 2019-11-14 PROCEDURE — 92526 ORAL FUNCTION THERAPY: CPT

## 2019-11-14 PROCEDURE — 25000003 PHARM REV CODE 250: Performed by: NURSE PRACTITIONER

## 2019-11-14 PROCEDURE — 97530 THERAPEUTIC ACTIVITIES: CPT

## 2019-11-14 PROCEDURE — 99223 PR INITIAL HOSPITAL CARE,LEVL III: ICD-10-PCS | Mod: ,,, | Performed by: INTERNAL MEDICINE

## 2019-11-14 PROCEDURE — 80048 BASIC METABOLIC PNL TOTAL CA: CPT | Mod: 91

## 2019-11-14 PROCEDURE — 94761 N-INVAS EAR/PLS OXIMETRY MLT: CPT

## 2019-11-14 PROCEDURE — 99223 1ST HOSP IP/OBS HIGH 75: CPT | Mod: ,,, | Performed by: INTERNAL MEDICINE

## 2019-11-14 PROCEDURE — 25000003 PHARM REV CODE 250: Performed by: INTERNAL MEDICINE

## 2019-11-14 PROCEDURE — 63600175 PHARM REV CODE 636 W HCPCS: Performed by: NURSE PRACTITIONER

## 2019-11-14 PROCEDURE — 36415 COLL VENOUS BLD VENIPUNCTURE: CPT

## 2019-11-14 PROCEDURE — 94640 AIRWAY INHALATION TREATMENT: CPT

## 2019-11-14 PROCEDURE — 21400001 HC TELEMETRY ROOM

## 2019-11-14 PROCEDURE — 97116 GAIT TRAINING THERAPY: CPT

## 2019-11-14 RX ADMIN — CARBIDOPA AND LEVODOPA 2 TABLET: 25; 100 TABLET ORAL at 09:11

## 2019-11-14 RX ADMIN — BRIMONIDINE TARTRATE 1 DROP: 1.5 SOLUTION OPHTHALMIC at 09:11

## 2019-11-14 RX ADMIN — BRIMONIDINE TARTRATE 1 DROP: 1.5 SOLUTION OPHTHALMIC at 03:11

## 2019-11-14 RX ADMIN — CEFTRIAXONE 1 G: 1 INJECTION, SOLUTION INTRAVENOUS at 01:11

## 2019-11-14 RX ADMIN — CARBIDOPA AND LEVODOPA 2 TABLET: 25; 100 TABLET ORAL at 03:11

## 2019-11-14 RX ADMIN — DEXTROSE: 5 SOLUTION INTRAVENOUS at 06:11

## 2019-11-14 RX ADMIN — FAMOTIDINE 20 MG: 20 TABLET ORAL at 09:11

## 2019-11-14 RX ADMIN — IPRATROPIUM BROMIDE AND ALBUTEROL SULFATE 3 ML: .5; 3 SOLUTION RESPIRATORY (INHALATION) at 08:11

## 2019-11-14 RX ADMIN — CARVEDILOL 6.25 MG: 6.25 TABLET, FILM COATED ORAL at 05:11

## 2019-11-14 RX ADMIN — AZITHROMYCIN MONOHYDRATE 500 MG: 500 INJECTION, POWDER, LYOPHILIZED, FOR SOLUTION INTRAVENOUS at 03:11

## 2019-11-14 RX ADMIN — IPRATROPIUM BROMIDE AND ALBUTEROL SULFATE 3 ML: .5; 3 SOLUTION RESPIRATORY (INHALATION) at 07:11

## 2019-11-14 RX ADMIN — CARVEDILOL 6.25 MG: 6.25 TABLET, FILM COATED ORAL at 09:11

## 2019-11-14 RX ADMIN — IPRATROPIUM BROMIDE AND ALBUTEROL SULFATE 3 ML: .5; 3 SOLUTION RESPIRATORY (INHALATION) at 01:11

## 2019-11-14 RX ADMIN — IPRATROPIUM BROMIDE AND ALBUTEROL SULFATE 3 ML: .5; 3 SOLUTION RESPIRATORY (INHALATION) at 03:11

## 2019-11-14 NOTE — NURSING
"Pt pulled NG tube out a little and kangaroo pump giving "error clog" message. Tube feeding held. CXR ordered to confirm placement. MD notified.  "

## 2019-11-14 NOTE — SUBJECTIVE & OBJECTIVE
Interval History: pt sitting up in bed during exam. Productive wet cough noted with difficulty with expectoration noted.  Yankaur at bedside w/ suction to assist.  Pt evaluated by cardiology with PEG placement recommended with moderate risk.  PEG placement per GI vs IR discussed.  IR consulted to evaluate for PEG placement.  Current plan of care continued.      Review of Systems   Unable to perform ROS: Mental status change     Objective:     Vital Signs (Most Recent):  Temp: 98.2 °F (36.8 °C) (11/14/19 1408)  Pulse: 92 (11/14/19 1349)  Resp: 20 (11/14/19 1349)  BP: (!) 111/57 (11/14/19 1243)  SpO2: 96 % (11/14/19 1349) Vital Signs (24h Range):  Temp:  [97.3 °F (36.3 °C)-98.5 °F (36.9 °C)] 98.2 °F (36.8 °C)  Pulse:  [65-92] 92  Resp:  [18-22] 20  SpO2:  [94 %-97 %] 96 %  BP: ()/(55-67) 111/57     Weight: 71.1 kg (156 lb 12 oz)  Body mass index is 20.13 kg/m².    Intake/Output Summary (Last 24 hours) at 11/14/2019 1433  Last data filed at 11/14/2019 0601  Gross per 24 hour   Intake 1800 ml   Output 950 ml   Net 850 ml      Physical Exam   Constitutional: He appears cachectic. He appears toxic. He appears ill.   HENT:   Head: Normocephalic.   Nose: Nose normal.   Mouth/Throat: Mucous membranes are dry. He has dentures.   NGT in place   Eyes: Conjunctivae are normal.   Neck: Normal range of motion. Neck supple.   Cardiovascular: Normal rate and intact distal pulses. An irregularly irregular rhythm present.   No murmur heard.  Pulmonary/Chest: Effort normal and breath sounds normal. No accessory muscle usage or stridor. No tachypnea. No respiratory distress. He has no wheezes. He has no rales.   Coarse throughout with scattered wheezing and rhonchi to all lobes.  Comfortable on 6L NC.  Loose, non-productive cough noted.   Abdominal: Soft. Bowel sounds are normal. He exhibits no distension. There is no tenderness.   Genitourinary:   Genitourinary Comments: [place] catheter present    Musculoskeletal:   Generalized  weakness; LUE PICC site WNL with DSG CDI.   Neurological: He is alert. GCS eye subscore is 3. GCS verbal subscore is 4. GCS motor subscore is 6.   Awake and alert, oriented to person and place.   Skin: Skin is warm and dry.   Skin is thin with skin tears present to BUE   Psychiatric: He has a normal mood and affect.   Nursing note reviewed.      Significant Labs:   CBC:   Recent Labs   Lab 11/13/19  0640 11/14/19  0452   WBC 8.78 7.26   HGB 11.0* 10.4*   HCT 36.5* 35.4*    166     CMP:   Recent Labs   Lab 11/13/19  2330 11/14/19  0452 11/14/19  1133    143 142   K 4.1 4.4 4.5    108 107   CO2 31* 31* 31*   * 122* 120*   BUN 32* 33* 33*   CREATININE 0.8 0.8 0.7   CALCIUM 8.3* 8.5* 8.2*   ANIONGAP 3* 4* 4*   EGFRNONAA >60 >60 >60     Magnesium: No results for input(s): MG in the last 48 hours.    Significant Imaging:   Imaging Results          CT Head Without Contrast (Final result)  Result time 11/10/19 08:55:34    Final result by Giulia Suazo MD (Timothy) (11/10/19 08:55:34)                 Impression:      No acute intracranial abnormality.  Atrophy is present.  Old lacunar insult suspected on the right.    All CT scans at this facility use dose modulation, iterative reconstructions, and/or weight base dosing when appropriate to reduce radiation dose to as low as reasonably achievable.      Electronically signed by: Giulia Suazo MD  Date:    11/10/2019  Time:    08:55             Narrative:    EXAMINATION:  CT HEAD WITHOUT CONTRAST    CLINICAL HISTORY:  Confusion/delirium, altered LOC, unexplained;    COMPARISON:  None    FINDINGS:  No intracranial acute hemorrhage or acute focal brain parenchymal abnormality is identified.  Atrophy is present.  Small old lacunar insult suspected in the anterior limb of internal capsule on the right.  Calvarium is intact.                               X-Ray Chest AP Portable (Final result)  Result time 11/10/19 08:19:14    Final result by Giuila  (Rusty Suazo MD (11/10/19 08:19:14)                 Impression:      Increasing patchy density at the right base suspicious for pneumonia.  Stable consolidation medial right base.  Follow-up is recommended      Electronically signed by: Giulia Suazo MD  Date:    11/10/2019  Time:    08:19             Narrative:    EXAMINATION:  XR CHEST AP PORTABLE    CLINICAL HISTORY:  <Diagnosis>, Sepsis;    COMPARISON:  11/06/2019    FINDINGS:  Stable heart size.  Left arm PICC catheter is in stable position.  There is focal consolidation at medial right lung base.  There is increasing patchy infiltrate developing at the right base as well.  Findings suspicious for pneumonia.

## 2019-11-14 NOTE — SUBJECTIVE & OBJECTIVE
Past Medical History:   Diagnosis Date    A-fib     COPD (chronic obstructive pulmonary disease)     Supratherapeutic INR 11/10/2019    UTI (urinary tract infection) 11/10/2019       History reviewed. No pertinent surgical history.    Review of patient's allergies indicates:  No Known Allergies    No current facility-administered medications on file prior to encounter.      Current Outpatient Medications on File Prior to Encounter   Medication Sig    albuterol (PROVENTIL) 2.5 mg /3 mL (0.083 %) nebulizer solution Take 2.5 mg by nebulization every 6 (six) hours as needed for Wheezing. Rescue    brimonidine 0.15 % OPTH DROP (ALPHAGAN) 0.15 % ophthalmic solution 1 drop 3 (three) times daily.    brinzolamide/brimonidine tart (SIMBRINZA OPHT) Apply 1 drop to eye 2 (two) times daily. 1 DROP IN RIGHT  EYE    carbidopa-levodopa  mg (SINEMET)  mg per tablet Take 2 tablets by mouth 3 (three) times daily.    carvedilol (COREG) 6.25 MG tablet Take 1 tablet (6.25 mg total) by mouth 2 (two) times daily.    famotidine (PEPCID) 20 MG tablet Take 1 tablet (20 mg total) by mouth once daily.    fluticasone propionate (FLOVENT HFA) 110 mcg/actuation inhaler Inhale 1 puff into the lungs 2 (two) times daily. Controller    furosemide (LASIX) 20 MG tablet Take 1 tablet (20 mg total) by mouth daily as needed (SOB).    guaiFENesin (MUCINEX) 600 mg 12 hr tablet Take 1 tablet (600 mg total) by mouth 2 (two) times daily.    lisinopril (PRINIVIL,ZESTRIL) 20 MG tablet Take 1 tablet (20 mg total) by mouth once daily.    polyethylene glycol (GLYCOLAX) 17 gram PwPk Take 17 g by mouth once daily.    amoxicillin-clavulanate 875-125mg (AUGMENTIN) 875-125 mg per tablet Take 1 tablet by mouth every 12 (twelve) hours.    vit C/vit E ac/lut/copper/zinc (PRESERVISION LUTEIN ORAL) Take by mouth.    vitamin D (VITAMIN D3) 1000 units Tab Take 1,000 Units by mouth once daily.    warfarin (COUMADIN) 2.5 MG tablet Take 1 tablet (2.5  mg total) by mouth Daily.     Family History     None        Tobacco Use    Smoking status: Former Smoker   Substance and Sexual Activity    Alcohol use: Not on file    Drug use: Not on file    Sexual activity: Not on file     Review of Systems   Constitution: Positive for decreased appetite and malaise/fatigue.   HENT: Negative for hearing loss and hoarse voice.    Eyes: Negative for visual disturbance.   Cardiovascular: Positive for dyspnea on exertion, irregular heartbeat and leg swelling. Negative for chest pain, claudication, near-syncope, orthopnea, palpitations, paroxysmal nocturnal dyspnea and syncope.   Respiratory: Positive for cough. Negative for hemoptysis, shortness of breath, sleep disturbances due to breathing, snoring and wheezing.    Endocrine: Negative for cold intolerance and heat intolerance.   Hematologic/Lymphatic: Bruises/bleeds easily.   Skin: Negative for color change, dry skin and nail changes.   Musculoskeletal: Positive for arthritis and back pain. Negative for joint pain and myalgias.   Gastrointestinal: Negative for bloating, abdominal pain, constipation, nausea and vomiting.        Aspirating    Genitourinary: Positive for dysuria and frequency. Negative for flank pain, hematuria and hesitancy.   Neurological: Negative for headaches, light-headedness, loss of balance, numbness, paresthesias and weakness.   Psychiatric/Behavioral: Negative for altered mental status.   Allergic/Immunologic: Negative for environmental allergies.     Objective:     Vital Signs (Most Recent):  Temp: 97.3 °F (36.3 °C) (11/14/19 0408)  Pulse: 77 (11/14/19 0408)  Resp: 18 (11/14/19 0408)  BP: 106/63 (11/14/19 0408)  SpO2: (!) 94 % (11/14/19 0408) Vital Signs (24h Range):  Temp:  [97 °F (36.1 °C)-97.8 °F (36.6 °C)] 97.3 °F (36.3 °C)  Pulse:  [74-89] 77  Resp:  [18-24] 18  SpO2:  [94 %-97 %] 94 %  BP: ()/(55-68) 106/63     Weight: 71.1 kg (156 lb 12 oz)  Body mass index is 20.13 kg/m².    SpO2: (!) 94  %  O2 Device (Oxygen Therapy): nasal cannula w/ humidification      Intake/Output Summary (Last 24 hours) at 11/14/2019 0651  Last data filed at 11/13/2019 1700  Gross per 24 hour   Intake 1050 ml   Output 675 ml   Net 375 ml       Lines/Drains/Airways     Peripherally Inserted Central Catheter Line                 PICC Double Lumen 10/30/19 0445 left brachial 15 days          Drain                 Urethral Catheter 10/29/19 1440 16 Fr. 15 days         NG/OG Tube 11/10/19 2300 Right nostril 3 days          Peripheral Intravenous Line                 Peripheral IV - Single Lumen 11/10/19 0755 20 G Right Wrist 3 days                Physical Exam   Constitutional: He is oriented to person, place, and time. He appears well-developed and well-nourished. No distress.   HENT:   Head: Normocephalic and atraumatic.   Eyes: Pupils are equal, round, and reactive to light.   Neck: Normal range of motion and full passive range of motion without pain. Neck supple. No JVD present.   Cardiovascular: Normal rate, S1 normal, S2 normal and intact distal pulses. An irregularly irregular rhythm present. PMI is not displaced. Exam reveals no distant heart sounds.   No murmur heard.  Pulses:       Radial pulses are 2+ on the right side, and 2+ on the left side.        Dorsalis pedis pulses are 2+ on the right side, and 2+ on the left side.   Remains in AFIB today, rate controlled   Pulmonary/Chest: Effort normal. No accessory muscle usage. No respiratory distress. He has decreased breath sounds in the right lower field and the left lower field. He has no wheezes. He has no rales.   Abdominal: Soft. Bowel sounds are normal. He exhibits no distension. There is no tenderness.   Musculoskeletal: Normal range of motion. He exhibits no edema.        Right ankle: He exhibits no swelling.        Left ankle: He exhibits no swelling.   Neurological: He is alert and oriented to person, place, and time.   Skin: Skin is warm and dry. He is not  diaphoretic. No cyanosis. Nails show no clubbing.   Psychiatric: He has a normal mood and affect. His speech is normal and behavior is normal. Judgment and thought content normal. Cognition and memory are normal.   Nursing note and vitals reviewed.      Significant Labs:   All pertinent lab results from the last 24 hours have been reviewed. and   Recent Lab Results       11/14/19  0523   11/14/19  0452   11/13/19  2330   11/13/19  2044   11/13/19  1743        Anion Gap   4 3   4     Baso #   0.04           Basophil%   0.6           BUN, Bld   33 32   34     Calcium   8.5 8.3   8.2     Chloride   108 109   111     CO2   31 31   30     Creatinine   0.8 0.8   0.8     Differential Method   Automated           eGFR if    >60 >60   >60     eGFR if non    >60  Comment:  Calculation used to obtain the estimated glomerular filtration  rate (eGFR) is the CKD-EPI equation.    >60  Comment:  Calculation used to obtain the estimated glomerular filtration  rate (eGFR) is the CKD-EPI equation.      >60  Comment:  Calculation used to obtain the estimated glomerular filtration  rate (eGFR) is the CKD-EPI equation.        Eos #   0.4           Eosinophil%   5.0           Glucose   122 124   132     Gran # (ANC)   5.5           Gran%   76.0           Hematocrit   35.4           Hemoglobin   10.4           Immature Grans (Abs)   0.04  Comment:  Mild elevation in immature granulocytes is non specific and   can be seen in a variety of conditions including stress response,   acute inflammation, trauma and pregnancy. Correlation with other   laboratory and clinical findings is essential.             Immature Granulocytes   0.6           Coumadin Monitoring INR   1.2  Comment:  Coumadin Therapy:  2.0 - 3.0 for INR for all indicators except mechanical heart valves  and antiphospholipid syndromes which should use 2.5 - 3.5.             Lymph #   0.6           Lymph%   8.8           MCH   32.0           MCHC    29.4           MCV   109           Mono #   0.7           Mono%   9.0           MPV   10.2           nRBC   0           Platelets   166           POCT Glucose 111     94       Potassium   4.4 4.1   4.2     Protime   12.7           RBC   3.25           RDW   13.9           Sodium   143 143   145     WBC   7.26                            11/13/19  1325   11/13/19  1151        Anion Gap 5       Baso #         Basophil%         BUN, Bld 34       Calcium 8.6       Chloride 111       CO2 31       Creatinine 0.8       Differential Method         eGFR if  >60       eGFR if non  >60  Comment:  Calculation used to obtain the estimated glomerular filtration  rate (eGFR) is the CKD-EPI equation.          Eos #         Eosinophil%         Glucose 119       Gran # (ANC)         Gran%         Hematocrit         Hemoglobin         Immature Grans (Abs)         Immature Granulocytes         Coumadin Monitoring INR         Lymph #         Lymph%         MCH         MCHC         MCV         Mono #         Mono%         MPV         nRBC         Platelets         POCT Glucose   106     Potassium 4.3       Protime         RBC         RDW         Sodium 147       WBC               Significant Imaging: Echocardiogram: 2D echo with color flow doppler:     CONCLUSIONS     1 - Moderately depressed left ventricular systolic function (EF 35-40%).     2 - Restrictive LV filling pattern, indicating markedly elevated LAP (grade 3 diastolic dysfunction).     3 - Concentric hypertrophy.     4 - Right atrial enlargement.     5 - Right ventricular enlargement with moderately depressed systolic function.     6 - Pulmonary hypertension. The estimated PA systolic pressure is greater than 52 mmHg.     7 - Mild aortic stenosis, WAYNE = 1.35 cm2, AVAi = 0.66 cm2/m2, peak velocity = 2.49 m/s, mean gradient = 18 mmHg.     8 - Mild to moderate aortic regurgitation.     9 - Mild mitral regurgitation.     10 - Mild to moderate  tricuspid regurgitation.             This document has been electronically    SIGNED BY: Jg Cobian MD On: 10/30/2019 12:37        and X-Ray: CXR: X-Ray Chest 1 View (CXR):   Results for orders placed or performed during the hospital encounter of 11/10/19   X-Ray Chest 1 View    Narrative    EXAMINATION:  XR CHEST 1 VIEW    CLINICAL HISTORY:  NG tube placement; fitting and adjustment of nonvascular catheter    COMPARISON:  A study performed 1 hour earlier    FINDINGS:  There has been interval placement of a nasogastric tube with the tip in the mid stomach.  Visualized portions of the lungs are unchanged with mixed interstitial and alveolar opacity throughout the mid and lower right lung.  Hilar and mediastinal contours and osseous structures are unchanged.      Impression    1.  Adequate position of the recently placed nasogastric tube.      Electronically signed by: Joaquim Rose MD  Date:    11/10/2019  Time:    22:57    and X-Ray Chest PA and Lateral (CXR): No results found for this visit on 11/10/19.

## 2019-11-14 NOTE — PT/OT/SLP PROGRESS
Physical Therapy  Treatment    Shaq Huffman   MRN: [id number]   Admitting Diagnosis: Pneumonia of right lower lobe due to infectious organism    PT Received On: 11/14/19  PT Start Time: 1500     PT Stop Time: 1523    PT Total Time (min): 23 min       Billable Minutes:  Gait Training 15 and Therapeutic Activity 8    Treatment Type: Treatment  PT/PTA: PTA     PTA Visit Number: 2       General Precautions: Standard, aspiration, NPO, fall  Orthopedic Precautions: N/A   Braces:           Subjective:  Communicated with [facility] prior to session.  Pt agreed to tx. Sitter in room and girlfriend.     Pain/Comfort  Pain Rating 1: 0/10    Objective:   Patient found with: bed alarm, oxygen, NG tube, zuluaga catheter    Functional Mobility:  Bed Mobility:      MIN A with supine to sit  CGA  With seated scooting to the eob.     Transfers:      Sit to stand : CGA  Stand to sit: cga    Gait:    2-3 fwd with the rw then 2-3 feet bwd to the chair. Limited by multiple lines and multiple people In the room (space limited) but did get sob but less then yesterday. Kyphotic posture.         Therapeutic Activities and Exercises:  Worked on static standing with the RW with cga , to work on activity tolerance 1 min x 2 trials     AM-PAC 6 CLICK MOBILITY  How much help from another person does this patient currently need?   1 = Unable, Total/Dependent Assistance  2 = A lot, Maximum/Moderate Assistance  3 = A little, Minimum/Contact Guard/Supervision  4 = None, Modified [place]/Independent    Turning over in bed (including adjusting bedclothes, sheets and blankets)?: 3  Sitting down on and standing up from a chair with arms (e.g., wheelchair, bedside commode, etc.): 3  Moving from lying on back to sitting on the side of the bed?: 3  Moving to and from a bed to a chair (including a wheelchair)?: 3  Need to walk in hospital room?: 3  Climbing 3-5 steps with a railing?: 1  Basic Mobility Total Score: 16    AM-PAC Raw Score CMS G-Code Modifier  Level of Impairment Assistance   6 % Total / Unable   7 - 9 CM 80 - 100% Maximal Assist   10 - 14 CL 60 - 80% Moderate Assist   15 - 19 CK 40 - 60% Moderate Assist   20 - 22 CJ 20 - 40% Minimal Assist   23 CI 1-20% SBA / CGA   24 CH 0% Independent/ Mod I     Patient left up in chair with all lines intact, call button in reach, chair alarm on and sitter x 2 and girlfriend present.    Assessment:  Shaq Huffman is a 86 y.o. male with a medical diagnosis of Pneumonia of right lower lobe due to infectious organism and presents with less SOB today.  Continues to be highly cooperative and motivated.     Rehab identified problem list/impairments: Rehab identified problem list/impairments: weakness, impaired endurance, impaired self care skills, impaired functional mobilty, gait instability, impaired balance, decreased upper extremity function, decreased lower extremity function, decreased ROM, impaired skin, decreased safety awareness, impaired cardiopulmonary response to activity    Rehab potential is good.    Activity tolerance: Good    Discharge recommendations: Discharge Facility/Level of Care Needs: nursing facility, skilled     Barriers to discharge:      Equipment recommendations: Equipment Needed After Discharge: walker, rolling     GOALS:   Multidisciplinary Problems     Physical Therapy Goals        Problem: Physical Therapy Goal    Goal Priority Disciplines Outcome Goal Variances Interventions   Physical Therapy Goal     PT, PT/OT Ongoing, Progressing     Description:  In one wek(11/18)pt will:  1. Go sit to/from supine with CGA  2. Go sit to stand with CGA  3. Ambulate with RW 50 ft with Melani  4. SPT with Melani                    PLAN:    Patient to be seen 5 x/week  to address the above listed problems via gait training, therapeutic activities, therapeutic exercises  Plan of Care expires: 11/19/19  Plan of Care reviewed with: patient, significant other         Yenifer Smallwood, PTA  11/14/2019

## 2019-11-14 NOTE — ASSESSMENT & PLAN NOTE
- No known history prior to hospitalization in October  - ECHO on 10/30 showed EF of 35-40% with grade 3 diastolic dysfunction, and pulmonary hypertension with an estimated PA systolic pressure is greater than 52 mmHg.   - Currently appears compensated  - Continue BB  - Hold home Lasix given dehydration with need for gentle IVFs  - Cardiology consulted-clearance with moderate cardiac risk for PEG placement   - Tele monitoring

## 2019-11-14 NOTE — PT/OT/SLP PROGRESS
Occupational Therapy      Patient Name:  Shaq Huffman   MRN:  [id number]    OT attempted tx at 11:55am, pt's sitter reported that pt had just fallen asleep and had been trying to rest. OT will attempt tx at later time/ date in order to continue with POC.     Kalyn Jose, PT/OT  11/14/2019

## 2019-11-14 NOTE — ASSESSMENT & PLAN NOTE
- normalized   - Slowly improving; likely secondary to dehydration/decreased oral intake  - Continue free water flushes at 200 cc q 4 hours  - Continue gentle IVFs with D5W at 50 cc/hour  - Serial BMP to avoid precipitous drop

## 2019-11-14 NOTE — ASSESSMENT & PLAN NOTE
- pt discharged to [facility] on 11/7/19   - PT/OT consulted and recommended Rehab placement pending course and medical stability  - CM following for DC planning

## 2019-11-14 NOTE — ASSESSMENT & PLAN NOTE
-Chronic AFIB, rate controlled  -Ok to proceed with PEG tube placement from cardiac standpoint at moderate risk  -Agree with Eliquis once PEG tube placement completed

## 2019-11-14 NOTE — CONSULTS
"  [facility]  Adult Nutrition  Progress Note    SUMMARY     Recommendations  Recommendation:   1.Continue current TF regimen. 2. RD to f/u.   Goals: Meet >75% of EEN, EPN by RD follow up.   Nutrition Goal Status: goal met   Communication of RD Recs: POC, sticky note     Reason for Assessment    Reason For Assessment: RD f/u  Diagnosis: pulmonary disease  Relevant Medical History: COPD, A fib  General Information Comments:  Pt confused/disoriented. Pt NPO per ST recs. Currently on TF @ goal rate via NG, tolerating. Possible PEG placement. Pt's wife at bedside. Per pt's wife, pt w/ good appetite and intake PTA.  Pt w/ no wt loss. Per epic records, pt weighed 141 lbs on 11/7/19, current wt= 156 lbs. NFPE not performed d/t pt w/ no s/s of malnutrition.   Nutrition Discharge Planning: pending medical course, likely on TF via PEG    Nutrition Risk Screen    Nutrition Risk Screen: dysphagia or difficulty swallowing    Nutrition/Diet History    Spiritual, Cultural Beliefs, Spiritism Practices, Values that Affect Care: no    Anthropometrics    Temp: 97.3 °F (36.3 °C)  Height: 6' 2" (188 cm)  Height (inches): 74 in  Weight Method: Bed Scale  Weight: 68.5 kg (151 lb 0.2 oz)  Weight (lb): 151.02 lb  Ideal Body Weight (IBW), Male: 190 lb  % Ideal Body Weight, Male (lb): 79.48 lb  BMI (Calculated): 19.4  BMI Grade: 18.5-24.9 - normal     Lab/Procedures/Meds    Pertinent Labs Reviewed: reviewed  BMP  Lab Results   Component Value Date     11/14/2019    K 4.4 11/14/2019     11/14/2019    CO2 31 (H) 11/14/2019    BUN 33 (H) 11/14/2019    CREATININE 0.8 11/14/2019    CALCIUM 8.5 (L) 11/14/2019    ANIONGAP 4 (L) 11/14/2019    ESTGFRAFRICA >60 11/14/2019    EGFRNONAA >60 11/14/2019     Lab Results   Component Value Date    CALCIUM 8.5 (L) 11/14/2019    PHOS 2.9 11/10/2019     Lab Results   Component Value Date    ALBUMIN 2.2 (L) 11/12/2019     Recent Labs   Lab 11/14/19  1117   POCTGLUCOSE 115*   No results " found for: LABA1C, HGBA1C    Pertinent Medications Reviewed: reviewed      Estimated/Assessed Needs      Weight Used For Calorie Calculations: 71.1 kg (156 lb 12 oz)  Energy Calorie Requirements (kcal):  2133  Kcals/day   Energy Need Method: Kcal/kg  Protein Requirements: 71 - 106 g  Weight Used For Protein Calculations: 71.1 kg (156 lb 12 oz)  Fluid Requirements (mL): RDA method or per MD  Estimated Fluid Requirement Method: RDA Method  RDA Method (mL): 2133         Nutrition Prescription Ordered    Current Diet Order: NPO  Current nutrition support formula ordered: Isosource 1.5 @ 60 ml/hr     Evaluation of Received Nutrient/Fluid Intake    Enteral calories (kcal): 2160  Enteral protein (gm): 97 g   % kcal needs: 101  % protein needs: 100      Intake/Output Summary (Last 24 hours) at 11/14/2019 1123  Last data filed at 11/14/2019 0601  Gross per 24 hour   Intake 1800 ml   Output 950 ml   Net 850 ml         % Intake of Estimated Energy Needs: 75- 100 %  % Meal Intake: NPO    Nutrition Risk    2xweekly    Assessment and Plan    Nutrition Problem  Inadequate energy intake     Related to (etiology):   Inability to consume sufficient energy     Signs and Symptoms (as evidenced by):   NPO     Interventions:  Collaboration of nutrition care w/ other providers   Enteral Nutrition     Nutrition Diagnosis Status:   Resolved     Monitor and Evaluation    Food and Nutrient Intake: energy intake, enteral nutrition intake  Food and Nutrient Adminstration: enteral and parenteral nutrition administration   Anthropometric Measurements: weight  Biochemical Data, Medical Tests and Procedures: electrolyte and renal panel, inflammatory profile, lipid profile, gastrointestinal profile, glucose/endocrine profile  Nutrition-Focused Physical Findings: overall appearance         Nutrition Follow-Up    RD Follow-up?: Yes

## 2019-11-14 NOTE — PT/OT/SLP PROGRESS
Speech Language Pathology Treatment    Patient Name:  Shaq Huffman   MRN:  [id number]  Admitting Diagnosis: Pneumonia of right lower lobe due to infectious organism    Recommendations:                 General Recommendations:  Dysphagia therapy  Diet recommendations:  NPO, Liquid Diet Level: NPO   Aspiration Precautions:    General Precautions: Standard, aspiration, NPO, respiratory  Communication strategies:  provide increased time to answer    Subjective     Pt alert and cooperative. His mouth remains very dry an d he is unable to bring up phlegm. Nsg plans to request deep suction by RT.  Patient goals:      Pain/Comfort:  · Pain Rating 1: 0/10  · Pain Rating Post-Intervention 1: 0/10    Objective:     Has the patient been evaluated by SLP for swallowing?   Yes  Keep patient NPO? Yes   Current Respiratory Status: Venti mask      Pt completed oral motor, tongue base retraction and laryngeal elevation/adduction ex x 20 each with min cues to improve swallow function. Education was provided on ex and family was provided with ex program to complete later today. Pt remains too weak for po intake and is awaiting consult for PEG placement.     Assessment:     Shaq Huffman is a 86 y.o. male with an SLP diagnosis of Dysphagia.  He presents with risk of aspiration.    Goals:   Multidisciplinary Problems     SLP Goals        Problem: SLP Goal    Goal Priority Disciplines Outcome   SLP Goal     SLP Ongoing, Progressing   Description:  LTG:  Pt will tolerate least restrictive diet consistency safely and efficiently.       ST. Oral motor tasks x 20 with cues             2. Oral Stim x20 with 100% timely laryngeal excursions             3. TBR and LE x20              4. Repeat MBSS if indicated                    Plan:     · Patient to be seen:  5 x/week   · Plan of Care expires:     · Plan of Care reviewed with:  patient, caregiver   · SLP Follow-Up:  Yes       Discharge recommendations:      Barriers to Discharge:       Time Tracking:     SLP Treatment Date:   11/14/19  Speech Start Time:  0931  Speech Stop Time:  0955     Speech Total Time (min):  24 min    Billable Minutes: Treatment Swallowing Dysfunction 24    Danielle Willis CCC-SLP  11/14/2019

## 2019-11-14 NOTE — ASSESSMENT & PLAN NOTE
- Placed in OBS and transitioned to inpatient  - Recently treated for aspiration PNA and discharged to [facility] on Augmentin   - Failed MBSS and refused PEG on last admission with pureed diet and honey thick liquids recommended   - BC show NGTD  - Continue IV ABX  - Supplemental O2  - Scheduled nebs  - Strict NPO  - Supportive care  - Patient is a DNR

## 2019-11-14 NOTE — PLAN OF CARE
Recommendations  Recommendation:   1.Continue current TF regimen. 2. RD to f/u.   Goals: Meet >75% of EEN, EPN by RD follow up.   Nutrition Goal Status: goal met   Communication of RD Recs: POC, sticky note

## 2019-11-14 NOTE — CONSULTS
[facility]  Cardiology  Consult Note    Patient Name: Shaq Huffman  MRN: [id number]  Admission Date: 11/10/2019  Hospital Length of Stay: 3 days  Code Status: DNR   Attending Provider: Josesito Franco MD   Consulting Provider: LARA Rosen  Primary Care Physician: Provider Not[employer]ystem  Principal Problem:Pneumonia of right lower lobe due to infectious organism    Patient information was obtained from patient, past medical records and ER records.     Inpatient consult to Cardiology  Consult performed by: LARA Ny  Consult ordered by: Hilda Bailey NP        Subjective:     Chief Complaint:  Shortness of breath     HPI:   Mr. Huffman is an 86 year old male patient whose current medical conditions include Parkinson's disease, chronic afib (on Coumadin), GERD, and COPD. [place] [facility] [place] [facility] from [facility] ED due to respiratory failure. Patient was [place] [employer] [place] when he began to complain of progressively worsening SOB. Associated symptoms included coughing and lethargy. EMT was called and subsequently sent patient to ED. His respiratory status continued to decline and he was subsequently intubated    CXR on recent admission showed RLL infiltrate concerning for aspiration and coarse interstitial markings. EKG showed afib with RVR with HR in 150's and patient was subsequently admitted for further evaluation and treatment. He was treated with abx, diuresed and Coumadin continued for CVA prophylaxis. Pt failed MBSS during recent admission and refused PEG placement at that time Recommendation given on last admission for OP stress test with his Cardiologist in [place] once returning home.     Was discharged to [facility] and has now been readmitted with aspiration and UTI. Chest xray showed increasing patchy density at the right base suspicious for pneumonia with stable consolidation medial right base.      Cardiology consulted to clear for PEG placement this admission.   2D echo showed EF of 35-40%, DD, and RVE with moderately depressed systolic function, pulm HTN, mild to moderate ASl, mild MR and mild to moderate TR. Patient seen and examined in room, lying in bed. Denies chest pain or anginal equivalents. Has some degree of shortness of breath on exam today. NG tube in place today. Plan for PEG tube placement during admission. Ok to proceed with PEG tube placement from cardiac standpoint today. Further recs to follow.       Past Medical History:   Diagnosis Date    A-fib     COPD (chronic obstructive pulmonary disease)     Supratherapeutic INR 11/10/2019    UTI (urinary tract infection) 11/10/2019       History reviewed. No pertinent surgical history.    Review of patient's allergies indicates:  No Known Allergies    No current facility-administered medications on file prior to encounter.      Current Outpatient Medications on File Prior to Encounter   Medication Sig    albuterol (PROVENTIL) 2.5 mg /3 mL (0.083 %) nebulizer solution Take 2.5 mg by nebulization every 6 (six) hours as needed for Wheezing. Rescue    brimonidine 0.15 % OPTH DROP (ALPHAGAN) 0.15 % ophthalmic solution 1 drop 3 (three) times daily.    brinzolamide/brimonidine tart (SIMBRINZA OPHT) Apply 1 drop to eye 2 (two) times daily. 1 DROP IN RIGHT  EYE    carbidopa-levodopa  mg (SINEMET)  mg per tablet Take 2 tablets by mouth 3 (three) times daily.    carvedilol (COREG) 6.25 MG tablet Take 1 tablet (6.25 mg total) by mouth 2 (two) times daily.    famotidine (PEPCID) 20 MG tablet Take 1 tablet (20 mg total) by mouth once daily.    fluticasone propionate (FLOVENT HFA) 110 mcg/actuation inhaler Inhale 1 puff into the lungs 2 (two) times daily. Controller    furosemide (LASIX) 20 MG tablet Take 1 tablet (20 mg total) by mouth daily as needed (SOB).    guaiFENesin (MUCINEX) 600 mg 12 hr tablet Take 1 tablet (600 mg total) by  mouth 2 (two) times daily.    lisinopril (PRINIVIL,ZESTRIL) 20 MG tablet Take 1 tablet (20 mg total) by mouth once daily.    polyethylene glycol (GLYCOLAX) 17 gram PwPk Take 17 g by mouth once daily.    amoxicillin-clavulanate 875-125mg (AUGMENTIN) 875-125 mg per tablet Take 1 tablet by mouth every 12 (twelve) hours.    vit C/vit E ac/lut/copper/zinc (PRESERVISION LUTEIN ORAL) Take by mouth.    vitamin D (VITAMIN D3) 1000 units Tab Take 1,000 Units by mouth once daily.    warfarin (COUMADIN) 2.5 MG tablet Take 1 tablet (2.5 mg total) by mouth Daily.     Family History     None        Tobacco Use    Smoking status: Former Smoker   Substance and Sexual Activity    Alcohol use: Not on file    Drug use: Not on file    Sexual activity: Not on file     Review of Systems   Constitution: Positive for decreased appetite and malaise/fatigue.   HENT: Negative for hearing loss and hoarse voice.    Eyes: Negative for visual disturbance.   Cardiovascular: Positive for dyspnea on exertion, irregular heartbeat and leg swelling. Negative for chest pain, claudication, near-syncope, orthopnea, palpitations, paroxysmal nocturnal dyspnea and syncope.   Respiratory: Positive for cough. Negative for hemoptysis, shortness of breath, sleep disturbances due to breathing, snoring and wheezing.    Endocrine: Negative for cold intolerance and heat intolerance.   Hematologic/Lymphatic: Bruises/bleeds easily.   Skin: Negative for color change, dry skin and nail changes.   Musculoskeletal: Positive for arthritis and back pain. Negative for joint pain and myalgias.   Gastrointestinal: Negative for bloating, abdominal pain, constipation, nausea and vomiting.        Aspirating    Genitourinary: Positive for dysuria and frequency. Negative for flank pain, hematuria and hesitancy.   Neurological: Negative for headaches, light-headedness, loss of balance, numbness, paresthesias and weakness.   Psychiatric/Behavioral: Negative for altered  mental status.   Allergic/Immunologic: Negative for environmental allergies.     Objective:     Vital Signs (Most Recent):  Temp: 97.3 °F (36.3 °C) (11/14/19 0408)  Pulse: 77 (11/14/19 0408)  Resp: 18 (11/14/19 0408)  BP: 106/63 (11/14/19 0408)  SpO2: (!) 94 % (11/14/19 0408) Vital Signs (24h Range):  Temp:  [97 °F (36.1 °C)-97.8 °F (36.6 °C)] 97.3 °F (36.3 °C)  Pulse:  [74-89] 77  Resp:  [18-24] 18  SpO2:  [94 %-97 %] 94 %  BP: ()/(55-68) 106/63     Weight: 71.1 kg (156 lb 12 oz)  Body mass index is 20.13 kg/m².    SpO2: (!) 94 %  O2 Device (Oxygen Therapy): nasal cannula w/ humidification      Intake/Output Summary (Last 24 hours) at 11/14/2019 0651  Last data filed at 11/13/2019 1700  Gross per 24 hour   Intake 1050 ml   Output 675 ml   Net 375 ml       Lines/Drains/Airways     Peripherally Inserted Central Catheter Line                 PICC Double Lumen 10/30/19 0445 left brachial 15 days          Drain                 Urethral Catheter 10/29/19 1440 16 Fr. 15 days         NG/OG Tube 11/10/19 2300 Right nostril 3 days          Peripheral Intravenous Line                 Peripheral IV - Single Lumen 11/10/19 0755 20 G Right Wrist 3 days                Physical Exam   Constitutional: He is oriented to person, place, and time. He appears well-developed and well-nourished. No distress.   HENT:   Head: Normocephalic and atraumatic.   Eyes: Pupils are equal, round, and reactive to light.   Neck: Normal range of motion and full passive range of motion without pain. Neck supple. No JVD present.   Cardiovascular: Normal rate, S1 normal, S2 normal and intact distal pulses. An irregularly irregular rhythm present. PMI is not displaced. Exam reveals no distant heart sounds.   No murmur heard.  Pulses:       Radial pulses are 2+ on the right side, and 2+ on the left side.        Dorsalis pedis pulses are 2+ on the right side, and 2+ on the left side.   Remains in AFIB today, rate controlled   Pulmonary/Chest: Effort  normal. No accessory muscle usage. No respiratory distress. He has decreased breath sounds in the right lower field and the left lower field. He has no wheezes. He has no rales.   Abdominal: Soft. Bowel sounds are normal. He exhibits no distension. There is no tenderness.   Musculoskeletal: Normal range of motion. He exhibits no edema.        Right ankle: He exhibits no swelling.        Left ankle: He exhibits no swelling.   Neurological: He is alert and oriented to person, place, and time.   Skin: Skin is warm and dry. He is not diaphoretic. No cyanosis. Nails show no clubbing.   Psychiatric: He has a normal mood and affect. His speech is normal and behavior is normal. Judgment and thought content normal. Cognition and memory are normal.   Nursing note and vitals reviewed.      Significant Labs:   All pertinent lab results from the last 24 hours have been reviewed. and   Recent Lab Results       11/14/19  0523   11/14/19  0452   11/13/19  2330   11/13/19  2044   11/13/19  1743        Anion Gap   4 3   4     Baso #   0.04           Basophil%   0.6           BUN, Bld   33 32   34     Calcium   8.5 8.3   8.2     Chloride   108 109   111     CO2   31 31   30     Creatinine   0.8 0.8   0.8     Differential Method   Automated           eGFR if    >60 >60   >60     eGFR if non    >60  Comment:  Calculation used to obtain the estimated glomerular filtration  rate (eGFR) is the CKD-EPI equation.    >60  Comment:  Calculation used to obtain the estimated glomerular filtration  rate (eGFR) is the CKD-EPI equation.      >60  Comment:  Calculation used to obtain the estimated glomerular filtration  rate (eGFR) is the CKD-EPI equation.        Eos #   0.4           Eosinophil%   5.0           Glucose   122 124   132     Gran # (ANC)   5.5           Gran%   76.0           Hematocrit   35.4           Hemoglobin   10.4           Immature Grans (Abs)   0.04  Comment:  Mild elevation in immature  granulocytes is non specific and   can be seen in a variety of conditions including stress response,   acute inflammation, trauma and pregnancy. Correlation with other   laboratory and clinical findings is essential.             Immature Granulocytes   0.6           Coumadin Monitoring INR   1.2  Comment:  Coumadin Therapy:  2.0 - 3.0 for INR for all indicators except mechanical heart valves  and antiphospholipid syndromes which should use 2.5 - 3.5.             Lymph #   0.6           Lymph%   8.8           MCH   32.0           MCHC   29.4           MCV   109           Mono #   0.7           Mono%   9.0           MPV   10.2           nRBC   0           Platelets   166           POCT Glucose 111     94       Potassium   4.4 4.1   4.2     Protime   12.7           RBC   3.25           RDW   13.9           Sodium   143 143   145     WBC   7.26                            11/13/19  1325   11/13/19  1151        Anion Gap 5       Baso #         Basophil%         BUN, Bld 34       Calcium 8.6       Chloride 111       CO2 31       Creatinine 0.8       Differential Method         eGFR if  >60       eGFR if non  >60  Comment:  Calculation used to obtain the estimated glomerular filtration  rate (eGFR) is the CKD-EPI equation.          Eos #         Eosinophil%         Glucose 119       Gran # (ANC)         Gran%         Hematocrit         Hemoglobin         Immature Grans (Abs)         Immature Granulocytes         Coumadin Monitoring INR         Lymph #         Lymph%         MCH         MCHC         MCV         Mono #         Mono%         MPV         nRBC         Platelets         POCT Glucose   106     Potassium 4.3       Protime         RBC         RDW         Sodium 147       WBC               Significant Imaging: Echocardiogram: 2D echo with color flow doppler:     CONCLUSIONS     1 - Moderately depressed left ventricular systolic function (EF 35-40%).     2 - Restrictive LV filling  pattern, indicating markedly elevated LAP (grade 3 diastolic dysfunction).     3 - Concentric hypertrophy.     4 - Right atrial enlargement.     5 - Right ventricular enlargement with moderately depressed systolic function.     6 - Pulmonary hypertension. The estimated PA systolic pressure is greater than 52 mmHg.     7 - Mild aortic stenosis, WAYNE = 1.35 cm2, AVAi = 0.66 cm2/m2, peak velocity = 2.49 m/s, mean gradient = 18 mmHg.     8 - Mild to moderate aortic regurgitation.     9 - Mild mitral regurgitation.     10 - Mild to moderate tricuspid regurgitation.             This document has been electronically    SIGNED BY: Jg Cobian MD On: 10/30/2019 12:37        and X-Ray: CXR: X-Ray Chest 1 View (CXR):   Results for orders placed or performed during the hospital encounter of 11/10/19   X-Ray Chest 1 View    Narrative    EXAMINATION:  XR CHEST 1 VIEW    CLINICAL HISTORY:  NG tube placement; fitting and adjustment of nonvascular catheter    COMPARISON:  A study performed 1 hour earlier    FINDINGS:  There has been interval placement of a nasogastric tube with the tip in the mid stomach.  Visualized portions of the lungs are unchanged with mixed interstitial and alveolar opacity throughout the mid and lower right lung.  Hilar and mediastinal contours and osseous structures are unchanged.      Impression    1.  Adequate position of the recently placed nasogastric tube.      Electronically signed by: Joaquim Rose MD  Date:    11/10/2019  Time:    22:57    and X-Ray Chest PA and Lateral (CXR): No results found for this visit on 11/10/19.    Assessment and Plan:     Systolic CHF, chronic  -Continue Coreg  -1500 mL fluid restriction  -Daily weights  -Strict intake and output    Atrial fibrillation  -Chronic AFIB, rate controlled  -Ok to proceed with PEG tube placement from cardiac standpoint at moderate risk  -Agree with Eliquis once PEG tube placement completed      VTE Risk Mitigation (From admission, onward)    None           Thank you for your consult. I will follow-up with patient. Please contact us if you have any additional questions.    LARA Rosen  Cardiology   [facility]

## 2019-11-14 NOTE — HPI
Mr. Huffman is an 86 year old male patient whose current medical conditions include Parkinson's disease, chronic afib (on Coumadin), GERD, and COPD. [place] [facility] [place] [facility] ED due to respiratory failure. Patient was on [place] [employer] [place] when he began to complain of progressively worsening SOB. Associated symptoms included coughing and lethargy. EMT was called and subsequently sent patient to ED. His respiratory status continued to decline and he was subsequently intubated    CXR on recent admission showed RLL infiltrate concerning for aspiration and coarse interstitial markings. EKG showed afib with RVR with HR in 150's and patient was subsequently admitted for further evaluation and treatment. He was treated with abx, diuresed and Coumadin continued for CVA prophylaxis. Pt failed MBSS during recent admission and refused PEG placement at that time Recommendation given on last admission for OP stress test with his Cardiologist in [place] once returning home.     Was discharged to [facility] and has now been readmitted with aspiration and UTI. Chest xray showed increasing patchy density at the right base suspicious for pneumonia with stable consolidation medial right base.     Cardiology consulted to clear for PEG placement this admission.   2D echo showed EF of 35-40%, DD, and RVE with moderately depressed systolic function, pulm HTN, mild to moderate ASl, mild MR and mild to moderate TR. Patient seen and examined in room, lying in bed. Denies chest pain or anginal equivalents. Has some degree of shortness of breath on exam today. NG tube in place today. Plan for PEG tube placement during admission. Ok to proceed with PEG tube placement from cardiac standpoint today. Further recs to follow.

## 2019-11-14 NOTE — PLAN OF CARE
Patient in room lying supine in bed, hob elevated, eyes closed aroused per verbal stimuli.  Patient disoriented to time and place redirection provided per staff.   Patient skin pale, mouth dry, on NPO diet. Oral care performed per this nurse, patient tolerated well.  NG tube present to right nostril, patient currently receiving isosorce 1.5  tube feeding  at 60 ml per hour.  Patient continue to receive 02 at 5L via nc.  Breath sounds diminished x all lobes, respiratory therapy utilized.   Suction at bedside.   Vallejo cath draining pierre colored urine.  Patient remain free from falls.  Continue safety measures.  Will continue to monitor.

## 2019-11-14 NOTE — CONSULTS
Chart reviewed and radiologist recommends off coumadin for 5 days prior to procedure if possible or a bridge.  Procedure can be scheduled Monday morning.  I will place order for ng tube to be placed Sunday nite and 1 bottle of thin barium to be given at 5 pm Sunday evening to better visualize abdomen anatomy.  Thank you for the consult.

## 2019-11-14 NOTE — ASSESSMENT & PLAN NOTE
"- Failed MBSS and refused PEG on last admission with pureed diet and honey thick liquids recommended   - Now with recurrent PNA, aware of the risks associated with continued oral intake and "wanted to try food" upon last DC  - ST following  - Strict NPO  - Continue TF via NGT  - Dietician consulted for TF recs  - HOB elevated >30 AAT  - Patient would like to proceed with PEG placement  - GI consulted for evaluation- requests cardiac evaluation  - Cardiology consulted with PEG placement recommended at moderate cardiac risk- PEG placement per GI vs IR discussed   - IR consulted to evaluate for PEG placement-awaiting recommendations  - Hold AC for now pending ability to place PEG    "

## 2019-11-14 NOTE — NURSING
Patient with productive cough received breathing treatment with respiratory therapist. Patient unable to cough up sputum, suction utilized, blood tinge sputum noted.Scant amount of blood present in cathether, notified MD. Will continue to monitor.

## 2019-11-14 NOTE — PROGRESS NOTES
[facility] Hospital Medicine  Progress Note    Patient Name: Shaq Huffman  MRN: [id number]  Patient Class: IP- Inpatient   Admission Date: 11/10/2019  Length of Stay: 3 days  Attending Physician: Josesito Franco MD  Primary Care Provider: Provider Notinsystem        Subjective:     Principal Problem:Pneumonia of right lower lobe due to infectious organism        HPI:   Shaq Huffman is a 86 y.o. male patient with a h/o Parkinson's dz, COPD, and Atrial fibrillation who presents to the Emergency Department for evaluation of worsening confusion which onset gradually several days ago. Symptoms are constant and moderate in severity. No mitigating or exacerbating factors reported. Associated sxs include generalized weakness, cough  (productive), decreased appetite, malaise, and dark urine. Patient denies any fever, chills, abd pain, SOB, CP, frequency/urgency, and all other sxs at this time. No prior Tx reported. No further complaints or concerns at this time. Pt was hospitalized at [facility] from 10/29/19-11/7/19 and treated for acute respiratory failure and aspiration pneumonia.  Pt failed MBSS during recent admission and refused PEG placement at that time.  Pt was discharged to [facility] on Augmentin. Pt is a full code and Shabbir Huffman (son) at [phone] and Alina Atwood (significant other) at [phone] are the surrogate decision makers.  ER work up showed: Na 151, Cl 117, BUN 32, Alk Phos 181, Bili 1.7, and Albumin 2.5.   Chest xray showed increasing patchy density at the right base suspicious for pneumonia with stable consolidation medial right base.  CT of head showed no acute intracranial abnormality with atrophy present and old lacunar insult suspected on the right.  UA positive for infectious process.  Hospital Medicine contacted for admission to Observation Unit.     Overview/Hospital Course:  On 11/10 patient was placed in OBS secondary to Aspiration PNA, UTI, AMS, and Coumadin  toxicity with an initial INR of 7.0.  CT head showed no acute intracranial abnormality.  Atrophy is present.  Old lacunar insult suspected on the right.  Patient was recently hospitalized here for aspiration PNA requiring intubation and mechanical ventilation at that time.  After stabilization patient was discharged to [facility] for continued PT/OT/ST.  The patient was evaluated by ST on his last admission who recommended strict NPO given continued risk for aspiration pneumonia.  A PEG tube was recommended at that time, however, patient adamantly refused, and wanted to continue eating by mouth having understood the risk of oral intake.  BC were drawn and patient was started on IV antibiotics.  He was also given Vitamin K given active nose bleeding with elevated INR.    As of 11/11 Multiple acute events noted overnight including placement of an NGT.  Patient with worsening respiratory distress requiring increased amounts of oxygen, currently on ventimask with 15L O2.  Nose bleeding ceased after administration of vitamin K and INR has improved to 3.9.  Per d/w patient's son who is present at the , he is now a DNR.  Son agrees to conservative treatment of UTI and pneumonia however he does not want any resuscitative measures including no CPR, no emergency drugs, no defibrillation, and no intubation/mechanical ventilation.  Of note, son reports patient was recently diagnosed with Parkinson's within the past year and his significant other reports associated difficulty swallowing for the past 2-3 months.  Prior to his most recent hospitalization here patient was an active 86-year-old who traveled here from [place].  ST following and recommended strict NPO status.  Son states if patient stabilizes he would reconsider PEG placement.  Registered dietitian consulted for tube feeding recommendations and patient will be started on Isosource 1.5 at 60 cc/hour with 100 cc of free water q 4 hours.  Will also start gentle IVFs  with D5W at 50 cc/hour.  BC show NGTD.  UC pending.  Will plan to DC zuluaga cath in AM.  PT/OT following and recommended SNF versus rehab once medically stable.  Patient is currently in guarded condition.     As of 11/12 Patient's respiratory status has improved slightly from yesterday.  Still requiring high flow oxygen.  D/W RT who will attempt to wean as tolerated to NC.  Na remains elevated, will increase free H2O flushes to 200 cc q 4 hours and continue D5W IVFs.  BC and UC shows NGTD.  Will continue Rocephin and Azithromycin for PNA.  PT/OT/ST continues to follow.  Patient is severely deconditioned and remains in guarded condition.      As of 11/13 Patient is much more alert today and answering questions appropriately.  ST continues to follow and recommends strict NPO.  Discussed treatment options at length with patient and his son who is present at the , and patient is currently requesting PEG placement if possible.  Will consult GI for evaluation.  Continue IV ABX for PNA.  Plan to hold AC for now given possibility of PEG placement.  Patient and son agree to transitioning Coumadin to Eliquis after PEG placement.  Cardiology consult in AM for procedure clearance.    On 11/14/19, pt alert and answering questions appropriately when questioned.  GI consulted with evaluation for PEG placement completed and cardiac clearance completed.  Placement of PEG recommended with moderate cardiac risk assessed.  PEG placement with GI vs IR discussed.  IR consulted to evaluate for placement.  Will await recommendations. Pt continues to experience productive cough and difficulty with expectoration.  Pt encouraged to clear secretions and Yankauer at bedside.  Current plan of care continued.      Interval History: pt sitting up in bed during exam. Productive wet cough noted with difficulty with expectoration noted.  Yankaur at bedside w/ suction to assist.  Pt evaluated by cardiology with PEG placement recommended with moderate  risk.  PEG placement per GI vs IR discussed.  IR consulted to evaluate for PEG placement.  Current plan of care continued.      Review of Systems   Unable to perform ROS: Mental status change     Objective:     Vital Signs (Most Recent):  Temp: 98.2 °F (36.8 °C) (11/14/19 1408)  Pulse: 92 (11/14/19 1349)  Resp: 20 (11/14/19 1349)  BP: (!) 111/57 (11/14/19 1243)  SpO2: 96 % (11/14/19 1349) Vital Signs (24h Range):  Temp:  [97.3 °F (36.3 °C)-98.5 °F (36.9 °C)] 98.2 °F (36.8 °C)  Pulse:  [65-92] 92  Resp:  [18-22] 20  SpO2:  [94 %-97 %] 96 %  BP: ()/(55-67) 111/57     Weight: 71.1 kg (156 lb 12 oz)  Body mass index is 20.13 kg/m².    Intake/Output Summary (Last 24 hours) at 11/14/2019 1433  Last data filed at 11/14/2019 0601  Gross per 24 hour   Intake 1800 ml   Output 950 ml   Net 850 ml      Physical Exam   Constitutional: He appears cachectic. He appears toxic. He appears ill.   HENT:   Head: Normocephalic.   Nose: Nose normal.   Mouth/Throat: Mucous membranes are dry. He has dentures.   NGT in place   Eyes: Conjunctivae are normal.   Neck: Normal range of motion. Neck supple.   Cardiovascular: Normal rate and intact distal pulses. An irregularly irregular rhythm present.   No murmur heard.  Pulmonary/Chest: Effort normal and breath sounds normal. No accessory muscle usage or stridor. No tachypnea. No respiratory distress. He has no wheezes. He has no rales.   Coarse throughout with scattered wheezing and rhonchi to all lobes.  Comfortable on 6L NC.  Loose, non-productive cough noted.   Abdominal: Soft. Bowel sounds are normal. He exhibits no distension. There is no tenderness.   Genitourinary:   Genitourinary Comments: Vallejo catheter present    Musculoskeletal:   Generalized weakness; LUE PICC site WNL with DSG CDI.   Neurological: He is alert. GCS eye subscore is 3. GCS verbal subscore is 4. GCS motor subscore is 6.   Awake and alert, oriented to person and place.   Skin: Skin is warm and dry.   Skin is thin  with skin tears present to [facility]   Psychiatric: He has a normal mood and affect.   Nursing note reviewed.      Significant Labs:   CBC:   Recent Labs   Lab 11/13/19  0640 11/14/19  0452   WBC 8.78 7.26   HGB 11.0* 10.4*   HCT 36.5* 35.4*    166     CMP:   Recent Labs   Lab 11/13/19  2330 11/14/19  0452 11/14/19  1133    143 142   K 4.1 4.4 4.5    108 107   CO2 31* 31* 31*   * 122* 120*   BUN 32* 33* 33*   CREATININE 0.8 0.8 0.7   CALCIUM 8.3* 8.5* 8.2*   ANIONGAP 3* 4* 4*   EGFRNONAA >60 >60 >60     Magnesium: No results for input(s): MG in the last 48 hours.    Significant Imaging:   Imaging Results          CT Head Without Contrast (Final result)  Result time 11/10/19 08:55:34    Final result by Giulia Suazo MD (Timothy) (11/10/19 08:55:34)                 Impression:      No acute intracranial abnormality.  Atrophy is present.  Old lacunar insult suspected on the right.    All CT scans at this facility use dose modulation, iterative reconstructions, and/or weight base dosing when appropriate to reduce radiation dose to as low as reasonably achievable.      Electronically signed by: Giulia Suazo MD  Date:    11/10/2019  Time:    08:55             Narrative:    EXAMINATION:  CT HEAD WITHOUT CONTRAST    CLINICAL HISTORY:  Confusion/delirium, altered LOC, unexplained;    COMPARISON:  None    FINDINGS:  No intracranial acute hemorrhage or acute focal brain parenchymal abnormality is identified.  Atrophy is present.  Small old lacunar insult suspected in the anterior limb of internal capsule on the right.  Calvarium is intact.                               X-Ray Chest AP Portable (Final result)  Result time 11/10/19 08:19:14    Final result by Giulia Suazo MD (Timothy) (11/10/19 08:19:14)                 Impression:      Increasing patchy density at the right base suspicious for pneumonia.  Stable consolidation medial right base.  Follow-up is recommended      Electronically signed  by: Giulia Suazo MD  Date:    11/10/2019  Time:    08:19             Narrative:    EXAMINATION:  XR CHEST AP PORTABLE    CLINICAL HISTORY:  <Diagnosis>, Sepsis;    COMPARISON:  11/06/2019    FINDINGS:  Stable heart size.  Left arm PICC catheter is in stable position.  There is focal consolidation at medial right lung base.  There is increasing patchy infiltrate developing at the right base as well.  Findings suspicious for pneumonia.                                Assessment/Plan:      * Pneumonia of right lower lobe due to infectious organism  - Placed in OBS and transitioned to inpatient  - Recently treated for aspiration PNA and discharged to [facility] on Augmentin   - Failed MBSS and refused PEG on last admission with pureed diet and honey thick liquids recommended   - BC show NGTD  - Continue IV ABX  - Supplemental O2  - Scheduled nebs  - Strict NPO  - Supportive care  - Patient is a DNR      Systolic CHF, chronic  - No known history prior to hospitalization in October  - ECHO on 10/30 showed EF of 35-40% with grade 3 diastolic dysfunction, and pulmonary hypertension with an estimated PA systolic pressure is greater than 52 mmHg.   - Currently appears compensated  - Continue BB  - Hold home Lasix given dehydration with need for gentle IVFs  - Cardiology consulted-clearance with moderate cardiac risk for PEG placement   - Tele monitoring      Macrocytic anemia  - B12 1763   - Folate 7.7  - H/H stable  - Daily CBC      Dehydration  - Continue TF per dietician recs  - Free water flushes, 200 cc q 4 hours  - Gentle IVFs  - Monitor      Hypernatremia  - normalized   - Slowly improving; likely secondary to dehydration/decreased oral intake  - Continue free water flushes at 200 cc q 4 hours  - Continue gentle IVFs with D5W at 50 cc/hour  - Serial BMP to avoid precipitous drop      Dysphagia  - Failed MBSS and refused PEG on last admission with pureed diet and honey thick liquids recommended   - Now with recurrent  "PNA, aware of the risks associated with continued oral intake and "wanted to try food" upon last DC  - ST following  - Strict NPO  - Continue TF via NGT  - Dietician consulted for TF recs  - HOB elevated >30 AAT  - Patient would like to proceed with PEG placement  - GI consulted for evaluation- requests cardiac evaluation  - Cardiology consulted with PEG placement recommended at moderate cardiac risk- PEG placement per GI vs IR discussed   - IR consulted to evaluate for PEG placement-awaiting recommendations  - Hold AC for now pending ability to place PEG      Malnutrition of moderate degree  - Dietician following  - Continue TF and free water flushes per recs      Altered mental status  - In the setting of PNA  - CT of head showed no acute intracranial abnormality and atrophy is present with old lacunar insult suspected on the right.  - Improving  - neuro checks   - Supportive care      Physical deconditioning  - pt discharged to [facility] on 11/7/19   - PT/OT consulted and recommended Rehab placement pending course and medical stability  - CM following for DC planning      Parkinson disease  - Continue home Sinemet   - Supportive care      Acute respiratory failure with hypoxia  - Multifactorial given aspiration PNA and severely deconditioned state  - Continue supplemental O2, will attempt to wean as tolerated  - Per son he is a DNR and does not want intubation/mechanical ventilation  - Treat PNA  - Scheduled nebs  - Supportive care  - Condition guarded      Atrial fibrillation  - Currently rate controlled   - Continue home Coreg   - Detailed discussion held with patient and son who agree with stopping Coumadin and starting on Eliquis for CVA prophylaxis   - Hold AC for now given potential placement of PEG tube  - Will need to start Eliquis post PEG placement      VTE Risk Mitigation (From admission, onward)    None                Rhoda Peterson NP  Department of Hospital Medicine   [facility]  "

## 2019-11-15 LAB
ANION GAP SERPL CALC-SCNC: 4 MMOL/L (ref 8–16)
ANION GAP SERPL CALC-SCNC: 4 MMOL/L (ref 8–16)
ANION GAP SERPL CALC-SCNC: 5 MMOL/L (ref 8–16)
BASOPHILS # BLD AUTO: 0.03 K/UL (ref 0–0.2)
BASOPHILS NFR BLD: 0.4 % (ref 0–1.9)
BUN SERPL-MCNC: 31 MG/DL (ref 8–23)
BUN SERPL-MCNC: 33 MG/DL (ref 8–23)
BUN SERPL-MCNC: 34 MG/DL (ref 8–23)
CALCIUM SERPL-MCNC: 8.4 MG/DL (ref 8.7–10.5)
CALCIUM SERPL-MCNC: 8.4 MG/DL (ref 8.7–10.5)
CALCIUM SERPL-MCNC: 8.5 MG/DL (ref 8.7–10.5)
CHLORIDE SERPL-SCNC: 102 MMOL/L (ref 95–110)
CHLORIDE SERPL-SCNC: 103 MMOL/L (ref 95–110)
CHLORIDE SERPL-SCNC: 105 MMOL/L (ref 95–110)
CO2 SERPL-SCNC: 31 MMOL/L (ref 23–29)
CO2 SERPL-SCNC: 32 MMOL/L (ref 23–29)
CO2 SERPL-SCNC: 33 MMOL/L (ref 23–29)
CREAT SERPL-MCNC: 0.7 MG/DL (ref 0.5–1.4)
CREAT SERPL-MCNC: 0.8 MG/DL (ref 0.5–1.4)
CREAT SERPL-MCNC: 0.8 MG/DL (ref 0.5–1.4)
DIFFERENTIAL METHOD: ABNORMAL
EOSINOPHIL # BLD AUTO: 0.3 K/UL (ref 0–0.5)
EOSINOPHIL NFR BLD: 3.9 % (ref 0–8)
ERYTHROCYTE [DISTWIDTH] IN BLOOD BY AUTOMATED COUNT: 13.6 % (ref 11.5–14.5)
EST. GFR  (AFRICAN AMERICAN): >60 ML/MIN/1.73 M^2
EST. GFR  (NON AFRICAN AMERICAN): >60 ML/MIN/1.73 M^2
GLUCOSE SERPL-MCNC: 101 MG/DL (ref 70–110)
GLUCOSE SERPL-MCNC: 116 MG/DL (ref 70–110)
GLUCOSE SERPL-MCNC: 123 MG/DL (ref 70–110)
HCT VFR BLD AUTO: 36 % (ref 40–54)
HGB BLD-MCNC: 10.8 G/DL (ref 14–18)
IMM GRANULOCYTES # BLD AUTO: 0.04 K/UL (ref 0–0.04)
IMM GRANULOCYTES NFR BLD AUTO: 0.5 % (ref 0–0.5)
INR PPP: 1.1 (ref 0.8–1.2)
LYMPHOCYTES # BLD AUTO: 0.6 K/UL (ref 1–4.8)
LYMPHOCYTES NFR BLD: 7.4 % (ref 18–48)
MCH RBC QN AUTO: 32.1 PG (ref 27–31)
MCHC RBC AUTO-ENTMCNC: 30 G/DL (ref 32–36)
MCV RBC AUTO: 107 FL (ref 82–98)
MONOCYTES # BLD AUTO: 0.7 K/UL (ref 0.3–1)
MONOCYTES NFR BLD: 8.9 % (ref 4–15)
NEUTROPHILS # BLD AUTO: 6.3 K/UL (ref 1.8–7.7)
NEUTROPHILS NFR BLD: 78.9 % (ref 38–73)
NRBC BLD-RTO: 0 /100 WBC
PLATELET # BLD AUTO: 162 K/UL (ref 150–350)
PMV BLD AUTO: 10.6 FL (ref 9.2–12.9)
POCT GLUCOSE: 122 MG/DL (ref 70–110)
POCT GLUCOSE: 129 MG/DL (ref 70–110)
POCT GLUCOSE: 139 MG/DL (ref 70–110)
POCT GLUCOSE: 95 MG/DL (ref 70–110)
POCT GLUCOSE: 99 MG/DL (ref 70–110)
POTASSIUM SERPL-SCNC: 4.5 MMOL/L (ref 3.5–5.1)
POTASSIUM SERPL-SCNC: 4.8 MMOL/L (ref 3.5–5.1)
POTASSIUM SERPL-SCNC: 4.8 MMOL/L (ref 3.5–5.1)
PROTHROMBIN TIME: 11.9 SEC (ref 9–12.5)
RBC # BLD AUTO: 3.36 M/UL (ref 4.6–6.2)
SODIUM SERPL-SCNC: 139 MMOL/L (ref 136–145)
SODIUM SERPL-SCNC: 140 MMOL/L (ref 136–145)
SODIUM SERPL-SCNC: 140 MMOL/L (ref 136–145)
WBC # BLD AUTO: 8 K/UL (ref 3.9–12.7)

## 2019-11-15 PROCEDURE — 97530 THERAPEUTIC ACTIVITIES: CPT

## 2019-11-15 PROCEDURE — 80048 BASIC METABOLIC PNL TOTAL CA: CPT | Mod: 91

## 2019-11-15 PROCEDURE — 25000003 PHARM REV CODE 250: Performed by: INTERNAL MEDICINE

## 2019-11-15 PROCEDURE — 25000242 PHARM REV CODE 250 ALT 637 W/ HCPCS: Performed by: NURSE PRACTITIONER

## 2019-11-15 PROCEDURE — 94640 AIRWAY INHALATION TREATMENT: CPT

## 2019-11-15 PROCEDURE — 21400001 HC TELEMETRY ROOM

## 2019-11-15 PROCEDURE — 25000003 PHARM REV CODE 250: Performed by: NURSE PRACTITIONER

## 2019-11-15 PROCEDURE — 36415 COLL VENOUS BLD VENIPUNCTURE: CPT

## 2019-11-15 PROCEDURE — 85610 PROTHROMBIN TIME: CPT

## 2019-11-15 PROCEDURE — 94761 N-INVAS EAR/PLS OXIMETRY MLT: CPT

## 2019-11-15 PROCEDURE — 85025 COMPLETE CBC W/AUTO DIFF WBC: CPT

## 2019-11-15 PROCEDURE — 27000221 HC OXYGEN, UP TO 24 HOURS

## 2019-11-15 PROCEDURE — 63600175 PHARM REV CODE 636 W HCPCS: Performed by: NURSE PRACTITIONER

## 2019-11-15 RX ORDER — FAMOTIDINE 20 MG/1
20 TABLET, FILM COATED ORAL 2 TIMES DAILY
Status: DISCONTINUED | OUTPATIENT
Start: 2019-11-15 | End: 2019-11-16

## 2019-11-15 RX ADMIN — DEXTROSE: 5 SOLUTION INTRAVENOUS at 10:11

## 2019-11-15 RX ADMIN — CARBIDOPA AND LEVODOPA 2 TABLET: 25; 100 TABLET ORAL at 10:11

## 2019-11-15 RX ADMIN — IPRATROPIUM BROMIDE AND ALBUTEROL SULFATE 3 ML: .5; 3 SOLUTION RESPIRATORY (INHALATION) at 02:11

## 2019-11-15 RX ADMIN — BRIMONIDINE TARTRATE 1 DROP: 1.5 SOLUTION OPHTHALMIC at 02:11

## 2019-11-15 RX ADMIN — IPRATROPIUM BROMIDE AND ALBUTEROL SULFATE 3 ML: .5; 3 SOLUTION RESPIRATORY (INHALATION) at 08:11

## 2019-11-15 RX ADMIN — CARBIDOPA AND LEVODOPA 2 TABLET: 25; 100 TABLET ORAL at 08:11

## 2019-11-15 RX ADMIN — CARVEDILOL 6.25 MG: 6.25 TABLET, FILM COATED ORAL at 08:11

## 2019-11-15 RX ADMIN — AZITHROMYCIN MONOHYDRATE 500 MG: 500 INJECTION, POWDER, LYOPHILIZED, FOR SOLUTION INTRAVENOUS at 02:11

## 2019-11-15 RX ADMIN — CEFTRIAXONE 1 G: 1 INJECTION, SOLUTION INTRAVENOUS at 12:11

## 2019-11-15 RX ADMIN — CARBIDOPA AND LEVODOPA 2 TABLET: 25; 100 TABLET ORAL at 02:11

## 2019-11-15 RX ADMIN — BRIMONIDINE TARTRATE 1 DROP: 1.5 SOLUTION OPHTHALMIC at 08:11

## 2019-11-15 RX ADMIN — FAMOTIDINE 20 MG: 20 TABLET ORAL at 10:11

## 2019-11-15 RX ADMIN — CEFTRIAXONE 1 G: 1 INJECTION, SOLUTION INTRAVENOUS at 02:11

## 2019-11-15 RX ADMIN — DEXTROSE: 5 SOLUTION INTRAVENOUS at 01:11

## 2019-11-15 RX ADMIN — FAMOTIDINE 20 MG: 20 TABLET ORAL at 08:11

## 2019-11-15 RX ADMIN — IPRATROPIUM BROMIDE AND ALBUTEROL SULFATE 3 ML: .5; 3 SOLUTION RESPIRATORY (INHALATION) at 07:11

## 2019-11-15 RX ADMIN — BRIMONIDINE TARTRATE 1 DROP: 1.5 SOLUTION OPHTHALMIC at 10:11

## 2019-11-15 NOTE — PLAN OF CARE
Dizzy and unable to take steps with a RW today. He did Require MOD A with bed mobility and MOD A for Sit to stand with a RW.

## 2019-11-15 NOTE — PROGRESS NOTES
Pt has frequent weak cough at this time. Congestion audibly heard in upper airway. Pt unable to cough it up. Pt orally suctioned and white/creamy/blood tinged secretions were removed from the back of his throat. Family educated on use of the yaunker.

## 2019-11-15 NOTE — ASSESSMENT & PLAN NOTE
- pt discharged to [facility] on 11/7/19   - PT/OT consulted and recommended Rehab placement pending course and medical stability  - CM following for DC planning

## 2019-11-15 NOTE — PT/OT/SLP PROGRESS
Physical Therapy  Treatment    Shaq Huffman   MRN: [id number]   Admitting Diagnosis: Pneumonia of right lower lobe due to infectious organism    PT Received On: 11/14/19  PT Start Time: 1500     PT Stop Time: 1523    PT Total Time (min): 23 min       Billable Minutes:  Therapeutic Activity 24    Treatment Type: Treatment  PT/PTA: PTA     PTA Visit Number: 2       General Precautions: Standard, aspiration, NPO, fall  Orthopedic Precautions: N/A   Braces:           Subjective:  Communicated with epic and nurse Ty prior to session.  Pt agreed to tx. Complained of fatigue. Once standing he reported being dizzy. Girlfriend and daughter in room. State that he had low BP earlier.         Objective:     Family at bed side. Pt with NG tube for feeding. Oxygen, IV , bed alarm.Coughing frequent with a wet cough. Not productive. Getting a PEG on Monday.     Functional Mobility:  Bed Mobility:     Supine to sit: MOD A   Sit to Supine: MOD X 2    Transfers:    Sit to stand: MOD A   Stand to sit: MOD A    Gait:     Unable to ambulated due to dizziness. Took several side steps at the eob but needed assistance to weight shift to allow him to advance feet and still exhibit difficulty. Utilized a RW. Increased SOB noted. Increased verbal and tactile cues needed.         Therapeutic Activities and Exercises:  Sat eob x 10 min. Too tired from coughing all day to tolerate strengthening exercises. Dizzy with standing and unable to ambulate away from the bed. , Also appeares to weak.  Static standing with the RW with MIN A x 2 min. And side stepping as noted above.     AM-PAC 6 CLICK MOBILITY  How much help from another person does this patient currently need?   1 = Unable, Total/Dependent Assistance  2 = A lot, Maximum/Moderate Assistance  3 = A little, Minimum/Contact Guard/Supervision  4 = None, Modified [place]/Independent         AM-PAC Raw Score CMS G-Code Modifier Level of Impairment Assistance   6 % Total / Unable    7 - 9 CM 80 - 100% Maximal Assist   10 - 14 CL 60 - 80% Moderate Assist   15 - 19 CK 40 - 60% Moderate Assist   20 - 22 CJ 20 - 40% Minimal Assist   23 CI 1-20% SBA / CGA   24 CH 0% Independent/ Mod I     Patient left HOB elevated with all lines intact, call button in reach, bed alarm on and family present.    Assessment:  Shaq Huffman is a 86 y.o. male with a medical diagnosis of Pneumonia of right lower lobe due to infectious organism and presents with increased fatigue and weakness today. Needed more assistance today compared to the last two days. Over all he is tolerating short sessions with therapy but fatigues quickly. Very cooperative and motivated.     Rehab identified problem list/impairments: Rehab identified problem list/impairments: weakness, impaired endurance, impaired self care skills, impaired functional mobilty, gait instability, impaired balance, decreased upper extremity function, decreased lower extremity function, decreased ROM, impaired skin, decreased safety awareness, impaired cardiopulmonary response to activity    Rehab potential is fair.    Activity tolerance: Poor    Discharge recommendations: Discharge Facility/Level of Care Needs: nursing facility, skilled     Barriers to discharge:      Equipment recommendations: Equipment Needed After Discharge: walker, rolling     GOALS:   Multidisciplinary Problems     Physical Therapy Goals        Problem: Physical Therapy Goal    Goal Priority Disciplines Outcome Goal Variances Interventions   Physical Therapy Goal     PT, PT/OT Ongoing, Progressing     Description:  In one wek(11/18)pt will:  1. Go sit to/from supine with CGA  2. Go sit to stand with CGA  3. Ambulate with RW 50 ft with Melani  4. SPT with Melani                    PLAN:    Patient to be seen 5 x/week  to address the above listed problems via gait training, therapeutic activities, therapeutic exercises  Plan of Care expires: 11/19/19  Plan of Care reviewed with: patient,  significant other         Yenifer Smallwood, PTA  11/15/2019

## 2019-11-15 NOTE — PROGRESS NOTES
[facility] Hospital Medicine  Progress Note    Patient Name: Shaq Huffman  MRN: [id number]  Patient Class: IP- Inpatient   Admission Date: 11/10/2019  Length of Stay: 4 days  Attending Physician: Josesito Franco MD  Primary Care Provider: Provider Notinsystem        Subjective:     Principal Problem:Pneumonia of right lower lobe due to infectious organism        HPI:   Shaq Huffman is a 86 y.o. male patient with a h/o Parkinson's dz, COPD, and Atrial fibrillation who presents to the Emergency Department for evaluation of worsening confusion which onset gradually several days ago. Symptoms are constant and moderate in severity. No mitigating or exacerbating factors reported. Associated sxs include generalized weakness, cough  (productive), decreased appetite, malaise, and dark urine. Patient denies any fever, chills, abd pain, SOB, CP, frequency/urgency, and all other sxs at this time. No prior Tx reported. No further complaints or concerns at this time. Pt was hospitalized at [facility] from 10/29/19-11/7/19 and treated for acute respiratory failure and aspiration pneumonia.  Pt failed MBSS during recent admission and refused PEG placement at that time.  Pt was discharged to [facility] on Augmentin. Pt is a full code and Shabbir Huffman (son) at [phone] and Alina Atwood (significant other) at [phone] are the surrogate decision makers.  ER work up showed: Na 151, Cl 117, BUN 32, Alk Phos 181, Bili 1.7, and Albumin 2.5.   Chest xray showed increasing patchy density at the right base suspicious for pneumonia with stable consolidation medial right base.  CT of head showed no acute intracranial abnormality with atrophy present and old lacunar insult suspected on the right.  UA positive for infectious process.  Hospital Medicine contacted for admission to Observation Unit.     Overview/Hospital Course:  On 11/10 patient was placed in OBS secondary to Aspiration PNA, UTI, AMS, and Coumadin  toxicity with an initial INR of 7.0.  CT head showed no acute intracranial abnormality.  Atrophy is present.  Old lacunar insult suspected on the right.  Patient was recently hospitalized here for aspiration PNA requiring intubation and mechanical ventilation at that time.  After stabilization patient was discharged to [facility] for continued PT/OT/ST.  The patient was evaluated by ST on his last admission who recommended strict NPO given continued risk for aspiration pneumonia.  A PEG tube was recommended at that time, however, patient adamantly refused, and wanted to continue eating by mouth having understood the risk of oral intake.  BC were drawn and patient was started on IV antibiotics.  He was also given Vitamin K given active nose bleeding with elevated INR.    As of 11/11 Multiple acute events noted overnight including placement of an NGT.  Patient with worsening respiratory distress requiring increased amounts of oxygen, currently on ventimask with 15L O2.  Nose bleeding ceased after administration of vitamin K and INR has improved to 3.9.  Per d/w patient's son who is present at the , he is now a DNR.  Son agrees to conservative treatment of UTI and pneumonia however he does not want any resuscitative measures including no CPR, no emergency drugs, no defibrillation, and no intubation/mechanical ventilation.  Of note, son reports patient was recently diagnosed with Parkinson's within the past year and his significant other reports associated difficulty swallowing for the past 2-3 months.  Prior to his most recent hospitalization here patient was an active 86-year-old who traveled here from [place].  ST following and recommended strict NPO status.  Son states if patient stabilizes he would reconsider PEG placement.  Registered dietitian consulted for tube feeding recommendations and patient will be started on Isosource 1.5 at 60 cc/hour with 100 cc of free water q 4 hours.  Will also start gentle IVFs  with D5W at 50 cc/hour.  BC show NGTD.  UC pending.  Will plan to DC zuluaga cath in AM.  PT/OT following and recommended SNF versus rehab once medically stable.  Patient is currently in guarded condition.     As of 11/12 Patient's respiratory status has improved slightly from yesterday.  Still requiring high flow oxygen.  D/W RT who will attempt to wean as tolerated to NC.  Na remains elevated, will increase free H2O flushes to 200 cc q 4 hours and continue D5W IVFs.  BC and UC shows NGTD.  Will continue Rocephin and Azithromycin for PNA.  PT/OT/ST continues to follow.  Patient is severely deconditioned and remains in guarded condition.      As of 11/13 Patient is much more alert today and answering questions appropriately.  ST continues to follow and recommends strict NPO.  Discussed treatment options at length with patient and his son who is present at the , and patient is currently requesting PEG placement if possible.  Will consult GI for evaluation.  Continue IV ABX for PNA.  Plan to hold AC for now given possibility of PEG placement.  Patient and son agree to transitioning Coumadin to Eliquis after PEG placement.  Cardiology consult in AM for procedure clearance.    On 11/14/19, pt alert and answering questions appropriately when questioned.  GI consulted with evaluation for PEG placement completed and cardiac clearance completed.  Placement of PEG recommended with moderate cardiac risk assessed.  PEG placement with GI vs IR discussed.  IR consulted to evaluate for placement.  Will await recommendations. Pt continues to experience productive cough and difficulty with expectoration.  Pt encouraged to clear secretions and Yankauer at bedside.  Current plan of care continued.      On 11/15/19, pt evaluated by IR for PEG placement with procedure planned for 11/18/19. Coumadin to be held for approximately 5 days prior to procedure.  NGT remains in place with pt tolerating tube feeding.  Plan of care discussed with  family at bedside and understanding verbalized.  Current plan of care contiuned.      Interval History: Pt remains stable.  Pt evaluated for PEG placement with procedure planned for 11/18/19.  Coumadin to be held at least 5 days prior to procedure. Plan of care reviewed with family at bedside and understanding verbalized. Current treatment plan continued.      Review of Systems   Unable to perform ROS: Mental status change     Objective:     Vital Signs (Most Recent):  Temp: 97.2 °F (36.2 °C) (11/15/19 1126)  Pulse: (!) 50 (11/15/19 1126)  Resp: 18 (11/15/19 1126)  BP: (!) 104/56 (11/15/19 1126)  SpO2: 96 % (11/15/19 1126) Vital Signs (24h Range):  Temp:  [48.2 °F (9 °C)-98.2 °F (36.8 °C)] 97.2 °F (36.2 °C)  Pulse:  [50-92] 50  Resp:  [18-22] 18  SpO2:  [94 %-97 %] 96 %  BP: ()/(56-74) 104/56     Weight: 74 kg (163 lb 2.3 oz)  Body mass index is 20.95 kg/m².    Intake/Output Summary (Last 24 hours) at 11/15/2019 1326  Last data filed at 11/15/2019 0500  Gross per 24 hour   Intake 2100 ml   Output 450 ml   Net 1650 ml      Physical Exam   Constitutional: He appears cachectic. He appears toxic. He appears ill.   HENT:   Head: Normocephalic.   Nose: Nose normal.   Mouth/Throat: Mucous membranes are dry. He has dentures.   NGT in place   Eyes: Conjunctivae are normal.   Neck: Normal range of motion. Neck supple.   Cardiovascular: Normal rate and intact distal pulses. An irregularly irregular rhythm present.   No murmur heard.  Pulmonary/Chest: Effort normal and breath sounds normal. No accessory muscle usage or stridor. No tachypnea. No respiratory distress. He has no wheezes. He has no rales.   Coarse throughout with scattered wheezing and rhonchi to all lobes.  Comfortable on 6L NC.  Loose, non-productive cough noted.   Abdominal: Soft. Bowel sounds are normal. He exhibits no distension. There is no tenderness.   Genitourinary:   Genitourinary Comments: Vallejo catheter present    Musculoskeletal: Normal range of  motion.   Generalized weakness; LUE PICC site WNL with DSG CDI.   Neurological: He is alert. GCS eye subscore is 3. GCS verbal subscore is 4. GCS motor subscore is 6.   Awake and alert, oriented to person and place.   Skin: Skin is warm and dry.   Skin is thin with skin tears present to BUE   Psychiatric: He has a normal mood and affect.   Nursing note reviewed.      Significant Labs:   CBC:   Recent Labs   Lab 11/14/19  0452 11/15/19  0438   WBC 7.26 8.00   HGB 10.4* 10.8*   HCT 35.4* 36.0*    162     CMP:   Recent Labs   Lab 11/14/19  2357 11/15/19  0438 11/15/19  1137    140 139   K 4.5 4.8 4.8    103 102   CO2 31* 33* 32*   * 101 123*   BUN 33* 31* 34*   CREATININE 0.7 0.8 0.8   CALCIUM 8.4* 8.4* 8.5*   ANIONGAP 4* 4* 5*   EGFRNONAA >60 >60 >60       Significant Imaging:   Imaging Results          CT Head Without Contrast (Final result)  Result time 11/10/19 08:55:34    Final result by Giulia Suazo MD (Timothy) (11/10/19 08:55:34)                 Impression:      No acute intracranial abnormality.  Atrophy is present.  Old lacunar insult suspected on the right.    All CT scans at this facility use dose modulation, iterative reconstructions, and/or weight base dosing when appropriate to reduce radiation dose to as low as reasonably achievable.      Electronically signed by: Giulia Suazo MD  Date:    11/10/2019  Time:    08:55             Narrative:    EXAMINATION:  CT HEAD WITHOUT CONTRAST    CLINICAL HISTORY:  Confusion/delirium, altered LOC, unexplained;    COMPARISON:  None    FINDINGS:  No intracranial acute hemorrhage or acute focal brain parenchymal abnormality is identified.  Atrophy is present.  Small old lacunar insult suspected in the anterior limb of internal capsule on the right.  Calvarium is intact.                               X-Ray Chest AP Portable (Final result)  Result time 11/10/19 08:19:14    Final result by Giulia Suazo MD (Timothy) (11/10/19 08:19:14)                  Impression:      Increasing patchy density at the right base suspicious for pneumonia.  Stable consolidation medial right base.  Follow-up is recommended      Electronically signed by: Giulia Suazo MD  Date:    11/10/2019  Time:    08:19             Narrative:    EXAMINATION:  XR CHEST AP PORTABLE    CLINICAL HISTORY:  <Diagnosis>, Sepsis;    COMPARISON:  11/06/2019    FINDINGS:  Stable heart size.  Left arm PICC catheter is in stable position.  There is focal consolidation at medial right lung base.  There is increasing patchy infiltrate developing at the right base as well.  Findings suspicious for pneumonia.                                Assessment/Plan:      * Pneumonia of right lower lobe due to infectious organism  - Placed in OBS and transitioned to inpatient  - Recently treated for aspiration PNA and discharged to [facility] on Augmentin   - Failed MBSS and refused PEG on last admission with pureed diet and honey thick liquids recommended   - BC show NGTD  - Continue IV ABX  - Supplemental O2  - Scheduled nebs  - Strict NPO  - Supportive care  - Patient is a DNR      Systolic CHF, chronic  - No known history prior to hospitalization in October  - ECHO on 10/30 showed EF of 35-40% with grade 3 diastolic dysfunction, and pulmonary hypertension with an estimated PA systolic pressure is greater than 52 mmHg.   - Currently appears compensated  - Continue BB  - Hold home Lasix given dehydration with need for gentle IVFs  - Cardiology consulted-clearance with moderate cardiac risk for PEG placement   - Tele monitoring      Macrocytic anemia  - B12 1763   - Folate 7.7  - H/H stable  - Daily CBC      Dehydration  - Continue TF per dietician recs  - Free water flushes, 200 cc q 4 hours  - Gentle IVFs  - Monitor      Hypernatremia  - normalized   - Slowly improving; likely secondary to dehydration/decreased oral intake  - Continue free water flushes at 200 cc q 4 hours  - Continue gentle IVFs with D5W  "at 50 cc/hour  - Serial BMP to avoid precipitous drop      Dysphagia  - Failed MBSS and refused PEG on last admission with pureed diet and honey thick liquids recommended   - Now with recurrent PNA, aware of the risks associated with continued oral intake and "wanted to try food" upon last DC  - ST following  - Strict NPO  - Continue TF via NGT  - Dietician consulted for TF recs  - HOB elevated >30 AAT  - Patient would like to proceed with PEG placement  - GI consulted for evaluation- requests cardiac evaluation  - Cardiology consulted with PEG placement recommended at moderate cardiac risk- PEG placement per GI vs IR discussed   - IR consulted to evaluate for PEG placement- procedure planned for 11/18/19  - Hold AC for now pending ability to place PEG- must be held for at least 5 days prior to procedure       Malnutrition of moderate degree  - Dietician following  - Continue TF and free water flushes per recs      Altered mental status  - In the setting of PNA  - CT of head showed no acute intracranial abnormality and atrophy is present with old lacunar insult suspected on the right.  - Improving  - neuro checks   - Supportive care      Physical deconditioning  - pt discharged to [facility] on 11/7/19   - PT/OT consulted and recommended Rehab placement pending course and medical stability  - CM following for DC planning      Parkinson disease  - Continue home Sinemet   - Supportive care      Acute respiratory failure with hypoxia  - Multifactorial given aspiration PNA and severely deconditioned state  - Continue supplemental O2, will attempt to wean as tolerated  - Per son he is a DNR and does not want intubation/mechanical ventilation  - Treat PNA  - Scheduled nebs  - Supportive care  - Condition guarded      Atrial fibrillation  - Currently rate controlled   - Continue home Coreg   - Detailed discussion held with patient and son who agree with stopping Coumadin and starting on Eliquis for CVA prophylaxis   - Hold " AC for now given potential placement of PEG tube  - Will need to start Eliquis post PEG placement      VTE Risk Mitigation (From admission, onward)    None                Rhoda Peterson NP  Department of Hospital Medicine   [facility]

## 2019-11-15 NOTE — NURSING
Karla Baxter NP notified that the x-ray for NG tube placement still had not been read. Karla Baxter NP read x-ray, confirmed that NG tube is in place and can be used. Feedings restarted at 60ml/hr. Will continue to monitor.

## 2019-11-15 NOTE — PLAN OF CARE
Ongoing (interventions implemented as appropriate)  Pt oriented to self.  VSS  Pt remained afebrile throughout this shift.   Pt remained free of falls this shift.   Plan of care reviewed. Family verbalizes understanding.   Patient Afib on monitor.   Pt tolerating tube feeding  Bed low, side rails up x 2, wheels locked, call light in reach.   Hourly rounding completed.   Will continue to monitor.

## 2019-11-15 NOTE — PLAN OF CARE
Problem: Adult Inpatient Plan of Care  Goal: Plan of Care Review  Outcome: Ongoing, Not Progressing   Pt AAO to self and place, disoriented to time and situation. VSS. Pt remained free of falls this shift. No complaints of pain or discomfort. Medications administered as ordered. Pt is Afib with controlled rate on monitor. PICC intact, infusing  D5. Tube feedings at 60ml/hr. Accu checks q4hrs. Neuro checks q4hrs. Avasys at bedside. Hourly rounding completed. Pt instructed to call for assistance. POC reviewed. Pt verbalized understanding, needs reinforcement. Will continue to monitor.

## 2019-11-15 NOTE — SUBJECTIVE & OBJECTIVE
Interval History: Pt remains stable.  Pt evaluated for PEG placement with procedure planned for 11/18/19.  Coumadin to be held at least 5 days prior to procedure. Plan of care reviewed with family at bedside and understanding verbalized. Current treatment plan continued.      Review of Systems   Unable to perform ROS: Mental status change     Objective:     Vital Signs (Most Recent):  Temp: 97.2 °F (36.2 °C) (11/15/19 1126)  Pulse: (!) 50 (11/15/19 1126)  Resp: 18 (11/15/19 1126)  BP: (!) 104/56 (11/15/19 1126)  SpO2: 96 % (11/15/19 1126) Vital Signs (24h Range):  Temp:  [48.2 °F (9 °C)-98.2 °F (36.8 °C)] 97.2 °F (36.2 °C)  Pulse:  [50-92] 50  Resp:  [18-22] 18  SpO2:  [94 %-97 %] 96 %  BP: ()/(56-74) 104/56     Weight: 74 kg (163 lb 2.3 oz)  Body mass index is 20.95 kg/m².    Intake/Output Summary (Last 24 hours) at 11/15/2019 1326  Last data filed at 11/15/2019 0500  Gross per 24 hour   Intake 2100 ml   Output 450 ml   Net 1650 ml      Physical Exam   Constitutional: He appears cachectic. He appears toxic. He appears ill.   HENT:   Head: Normocephalic.   Nose: Nose normal.   Mouth/Throat: Mucous membranes are dry. He has dentures.   NGT in place   Eyes: Conjunctivae are normal.   Neck: Normal range of motion. Neck supple.   Cardiovascular: Normal rate and intact distal pulses. An irregularly irregular rhythm present.   No murmur heard.  Pulmonary/Chest: Effort normal and breath sounds normal. No accessory muscle usage or stridor. No tachypnea. No respiratory distress. He has no wheezes. He has no rales.   Coarse throughout with scattered wheezing and rhonchi to all lobes.  Comfortable on 6L NC.  Loose, non-productive cough noted.   Abdominal: Soft. Bowel sounds are normal. He exhibits no distension. There is no tenderness.   Genitourinary:   Genitourinary Comments: Vallejo catheter present    Musculoskeletal: Normal range of motion.   Generalized weakness; LUE PICC site WNL with DSG CDI.   Neurological: He is  alert. GCS eye subscore is 3. GCS verbal subscore is 4. GCS motor subscore is 6.   Awake and alert, oriented to person and place.   Skin: Skin is warm and dry.   Skin is thin with skin tears present to BUE   Psychiatric: He has a normal mood and affect.   Nursing note reviewed.      Significant Labs:   CBC:   Recent Labs   Lab 11/14/19  0452 11/15/19  0438   WBC 7.26 8.00   HGB 10.4* 10.8*   HCT 35.4* 36.0*    162     CMP:   Recent Labs   Lab 11/14/19  2357 11/15/19  0438 11/15/19  1137    140 139   K 4.5 4.8 4.8    103 102   CO2 31* 33* 32*   * 101 123*   BUN 33* 31* 34*   CREATININE 0.7 0.8 0.8   CALCIUM 8.4* 8.4* 8.5*   ANIONGAP 4* 4* 5*   EGFRNONAA >60 >60 >60       Significant Imaging:   Imaging Results          CT Head Without Contrast (Final result)  Result time 11/10/19 08:55:34    Final result by Giulia Suazo MD (Timothy) (11/10/19 08:55:34)                 Impression:      No acute intracranial abnormality.  Atrophy is present.  Old lacunar insult suspected on the right.    All CT scans at this facility use dose modulation, iterative reconstructions, and/or weight base dosing when appropriate to reduce radiation dose to as low as reasonably achievable.      Electronically signed by: Giulia Suazo MD  Date:    11/10/2019  Time:    08:55             Narrative:    EXAMINATION:  CT HEAD WITHOUT CONTRAST    CLINICAL HISTORY:  Confusion/delirium, altered LOC, unexplained;    COMPARISON:  None    FINDINGS:  No intracranial acute hemorrhage or acute focal brain parenchymal abnormality is identified.  Atrophy is present.  Small old lacunar insult suspected in the anterior limb of internal capsule on the right.  Calvarium is intact.                               X-Ray Chest AP Portable (Final result)  Result time 11/10/19 08:19:14    Final result by Giulia Suazo MD (Timothy) (11/10/19 08:19:14)                 Impression:      Increasing patchy density at the right base suspicious  for pneumonia.  Stable consolidation medial right base.  Follow-up is recommended      Electronically signed by: Giulia Suazo MD  Date:    11/10/2019  Time:    08:19             Narrative:    EXAMINATION:  XR CHEST AP PORTABLE    CLINICAL HISTORY:  <Diagnosis>, Sepsis;    COMPARISON:  11/06/2019    FINDINGS:  Stable heart size.  Left arm PICC catheter is in stable position.  There is focal consolidation at medial right lung base.  There is increasing patchy infiltrate developing at the right base as well.  Findings suspicious for pneumonia.

## 2019-11-15 NOTE — ASSESSMENT & PLAN NOTE
- Placed in OBS and transitioned to inpatient  - Recently treated for aspiration PNA and discharged to [facility] on Augmentin   - Failed MBSS and refused PEG on last admission with pureed diet and honey thick liquids recommended   - BC show NGTD  - Continue IV ABX  - Supplemental O2  - Scheduled nebs  - Strict NPO  - Supportive care  - Patient is a DNR

## 2019-11-15 NOTE — ASSESSMENT & PLAN NOTE
"- Failed MBSS and refused PEG on last admission with pureed diet and honey thick liquids recommended   - Now with recurrent PNA, aware of the risks associated with continued oral intake and "wanted to try food" upon last DC  - ST following  - Strict NPO  - Continue TF via NGT  - Dietician consulted for TF recs  - HOB elevated >30 AAT  - Patient would like to proceed with PEG placement  - GI consulted for evaluation- requests cardiac evaluation  - Cardiology consulted with PEG placement recommended at moderate cardiac risk- PEG placement per GI vs IR discussed   - IR consulted to evaluate for PEG placement- procedure planned for 11/18/19  - Hold AC for now pending ability to place PEG- must be held for at least 5 days prior to procedure     "

## 2019-11-15 NOTE — PLAN OF CARE
Pt was dizzy today and weaker. Unable to take steps away from the bed and needed more assistance for supine<-> sit and sit<-> stand.

## 2019-11-15 NOTE — NURSING
While rounding on pt, it was noted that the NG tube was pulled about 2 inches out from the securement device. Tube feedings stopped. Unsure if pt pulled NG tube or if the securement device came off of pt's nose and the tube came out when the pt was coughing. Tube re-inserted to securement device. Karla Baxter NP notified, STAT x-ray ordered for placement verification. Will continue to monitor.

## 2019-11-16 LAB
ANION GAP SERPL CALC-SCNC: 4 MMOL/L (ref 8–16)
ANION GAP SERPL CALC-SCNC: 5 MMOL/L (ref 8–16)
ANION GAP SERPL CALC-SCNC: 5 MMOL/L (ref 8–16)
ANION GAP SERPL CALC-SCNC: 8 MMOL/L (ref 8–16)
BACTERIA BLD CULT: NORMAL
BACTERIA BLD CULT: NORMAL
BASOPHILS # BLD AUTO: 0.03 K/UL (ref 0–0.2)
BASOPHILS NFR BLD: 0.3 % (ref 0–1.9)
BUN SERPL-MCNC: 35 MG/DL (ref 8–23)
BUN SERPL-MCNC: 36 MG/DL (ref 8–23)
BUN SERPL-MCNC: 37 MG/DL (ref 8–23)
BUN SERPL-MCNC: 39 MG/DL (ref 8–23)
CALCIUM SERPL-MCNC: 8.3 MG/DL (ref 8.7–10.5)
CALCIUM SERPL-MCNC: 8.3 MG/DL (ref 8.7–10.5)
CALCIUM SERPL-MCNC: 8.5 MG/DL (ref 8.7–10.5)
CALCIUM SERPL-MCNC: 8.6 MG/DL (ref 8.7–10.5)
CHLORIDE SERPL-SCNC: 100 MMOL/L (ref 95–110)
CHLORIDE SERPL-SCNC: 100 MMOL/L (ref 95–110)
CHLORIDE SERPL-SCNC: 101 MMOL/L (ref 95–110)
CHLORIDE SERPL-SCNC: 104 MMOL/L (ref 95–110)
CO2 SERPL-SCNC: 23 MMOL/L (ref 23–29)
CO2 SERPL-SCNC: 31 MMOL/L (ref 23–29)
CO2 SERPL-SCNC: 31 MMOL/L (ref 23–29)
CO2 SERPL-SCNC: 32 MMOL/L (ref 23–29)
CREAT SERPL-MCNC: 0.8 MG/DL (ref 0.5–1.4)
DIFFERENTIAL METHOD: ABNORMAL
EOSINOPHIL # BLD AUTO: 0.1 K/UL (ref 0–0.5)
EOSINOPHIL NFR BLD: 1.4 % (ref 0–8)
ERYTHROCYTE [DISTWIDTH] IN BLOOD BY AUTOMATED COUNT: 13.6 % (ref 11.5–14.5)
EST. GFR  (AFRICAN AMERICAN): >60 ML/MIN/1.73 M^2
EST. GFR  (NON AFRICAN AMERICAN): >60 ML/MIN/1.73 M^2
GLUCOSE SERPL-MCNC: 120 MG/DL (ref 70–110)
GLUCOSE SERPL-MCNC: 126 MG/DL (ref 70–110)
GLUCOSE SERPL-MCNC: 129 MG/DL (ref 70–110)
GLUCOSE SERPL-MCNC: 95 MG/DL (ref 70–110)
HCT VFR BLD AUTO: 33.6 % (ref 40–54)
HGB BLD-MCNC: 10.1 G/DL (ref 14–18)
IMM GRANULOCYTES # BLD AUTO: 0.04 K/UL (ref 0–0.04)
IMM GRANULOCYTES NFR BLD AUTO: 0.5 % (ref 0–0.5)
INR PPP: 1.1 (ref 0.8–1.2)
LYMPHOCYTES # BLD AUTO: 0.6 K/UL (ref 1–4.8)
LYMPHOCYTES NFR BLD: 6.7 % (ref 18–48)
MCH RBC QN AUTO: 31.8 PG (ref 27–31)
MCHC RBC AUTO-ENTMCNC: 30.1 G/DL (ref 32–36)
MCV RBC AUTO: 106 FL (ref 82–98)
MONOCYTES # BLD AUTO: 1.1 K/UL (ref 0.3–1)
MONOCYTES NFR BLD: 12.4 % (ref 4–15)
NEUTROPHILS # BLD AUTO: 6.8 K/UL (ref 1.8–7.7)
NEUTROPHILS NFR BLD: 78.7 % (ref 38–73)
NRBC BLD-RTO: 0 /100 WBC
PLATELET # BLD AUTO: 150 K/UL (ref 150–350)
PMV BLD AUTO: 10.7 FL (ref 9.2–12.9)
POCT GLUCOSE: 123 MG/DL (ref 70–110)
POCT GLUCOSE: 126 MG/DL (ref 70–110)
POCT GLUCOSE: 153 MG/DL (ref 70–110)
POTASSIUM SERPL-SCNC: 5 MMOL/L (ref 3.5–5.1)
POTASSIUM SERPL-SCNC: 5 MMOL/L (ref 3.5–5.1)
POTASSIUM SERPL-SCNC: 5.2 MMOL/L (ref 3.5–5.1)
POTASSIUM SERPL-SCNC: 5.7 MMOL/L (ref 3.5–5.1)
PROTHROMBIN TIME: 12 SEC (ref 9–12.5)
RBC # BLD AUTO: 3.18 M/UL (ref 4.6–6.2)
SODIUM SERPL-SCNC: 135 MMOL/L (ref 136–145)
SODIUM SERPL-SCNC: 136 MMOL/L (ref 136–145)
SODIUM SERPL-SCNC: 136 MMOL/L (ref 136–145)
SODIUM SERPL-SCNC: 137 MMOL/L (ref 136–145)
WBC # BLD AUTO: 8.65 K/UL (ref 3.9–12.7)

## 2019-11-16 PROCEDURE — 99900035 HC TECH TIME PER 15 MIN (STAT)

## 2019-11-16 PROCEDURE — 25000242 PHARM REV CODE 250 ALT 637 W/ HCPCS: Performed by: NURSE PRACTITIONER

## 2019-11-16 PROCEDURE — 25000003 PHARM REV CODE 250: Performed by: INTERNAL MEDICINE

## 2019-11-16 PROCEDURE — 85610 PROTHROMBIN TIME: CPT

## 2019-11-16 PROCEDURE — 94761 N-INVAS EAR/PLS OXIMETRY MLT: CPT

## 2019-11-16 PROCEDURE — 27000221 HC OXYGEN, UP TO 24 HOURS

## 2019-11-16 PROCEDURE — 80048 BASIC METABOLIC PNL TOTAL CA: CPT

## 2019-11-16 PROCEDURE — 85025 COMPLETE CBC W/AUTO DIFF WBC: CPT

## 2019-11-16 PROCEDURE — 21400001 HC TELEMETRY ROOM

## 2019-11-16 PROCEDURE — 36415 COLL VENOUS BLD VENIPUNCTURE: CPT

## 2019-11-16 PROCEDURE — 94640 AIRWAY INHALATION TREATMENT: CPT

## 2019-11-16 PROCEDURE — 25000003 PHARM REV CODE 250: Performed by: NURSE PRACTITIONER

## 2019-11-16 PROCEDURE — 80048 BASIC METABOLIC PNL TOTAL CA: CPT | Mod: 91

## 2019-11-16 PROCEDURE — 63600175 PHARM REV CODE 636 W HCPCS: Performed by: NURSE PRACTITIONER

## 2019-11-16 RX ORDER — SCOLOPAMINE TRANSDERMAL SYSTEM 1 MG/1
1 PATCH, EXTENDED RELEASE TRANSDERMAL
Status: DISCONTINUED | OUTPATIENT
Start: 2019-11-16 | End: 2019-11-17 | Stop reason: HOSPADM

## 2019-11-16 RX ORDER — BRINZOLAMIDE 10 MG/ML
1 SUSPENSION/ DROPS OPHTHALMIC 2 TIMES DAILY
Status: DISCONTINUED | OUTPATIENT
Start: 2019-11-16 | End: 2019-11-16

## 2019-11-16 RX ADMIN — IPRATROPIUM BROMIDE AND ALBUTEROL SULFATE 3 ML: .5; 3 SOLUTION RESPIRATORY (INHALATION) at 02:11

## 2019-11-16 RX ADMIN — BRIMONIDINE TARTRATE 1 DROP: 1.5 SOLUTION OPHTHALMIC at 09:11

## 2019-11-16 RX ADMIN — CEFTRIAXONE 1 G: 1 INJECTION, SOLUTION INTRAVENOUS at 12:11

## 2019-11-16 RX ADMIN — CARBIDOPA AND LEVODOPA 2 TABLET: 25; 100 TABLET ORAL at 09:11

## 2019-11-16 RX ADMIN — CARVEDILOL 6.25 MG: 6.25 TABLET, FILM COATED ORAL at 09:11

## 2019-11-16 RX ADMIN — SCOPALAMINE 1 PATCH: 1 PATCH, EXTENDED RELEASE TRANSDERMAL at 11:11

## 2019-11-16 RX ADMIN — FAMOTIDINE 20 MG: 20 TABLET ORAL at 09:11

## 2019-11-16 RX ADMIN — BRIMONIDINE TARTRATE 1 DROP: 1.5 SOLUTION OPHTHALMIC at 12:11

## 2019-11-16 RX ADMIN — IPRATROPIUM BROMIDE AND ALBUTEROL SULFATE 3 ML: .5; 3 SOLUTION RESPIRATORY (INHALATION) at 08:11

## 2019-11-16 NOTE — PT/OT/SLP PROGRESS
Occupational Therapy      Patient Name:  Shaq Huffman   MRN:  [id number]    Patient not seen today secondary to pt with rapid response. Will follow-up on later date    Aylin Vital OT  11/16/2019

## 2019-11-16 NOTE — PT/OT/SLP PROGRESS
Occupational Therapy  Treatment    Shaq Huffman   MRN: [id number]   Admitting Diagnosis: Pneumonia of right lower lobe due to infectious organism    OT Date of Treatment: 11/15/19   OT Start Time: 1655  OT Stop Time: 1718  OT Total Time (min): 23 min    Billable Minutes:  Therapeutic Activity 23 MINUTES    General Precautions: Standard, fall, aspiration, NPO  Orthopedic Precautions: N/A  Braces: N/A         Subjective:  Communicated with NURSE KWONG AND Epic CHART REVIEW prior to session.    Pain/Comfort  Pain Rating 1: 0/10    Objective:  Patient found with: oxygen, telemetry, peripheral IV, NG tube     Functional Mobility:  Bed Mobility:  MOD A X 2 WITH FORWARD SCOOTING , MOD A SIT<>SUPINE  Transfers:     MOD A WITH SIT<>STAND     Functional Ambulation: MOD A X 2 WITH SIDE STEPPING HOB    Activities of Daily Living:     Feeding adaptive equipment: NA     UE adaptive equipment: MOD A      LE adaptive equipment: NA       Bathing adaptive equipment: NA    Balance:   Static Sit: POOR: Needs MODERATE assist to maintain  Dynamic Sit: POOR: N/A  Static Stand: POOR: Needs MODERATE assist to maintain  Dynamic stand: POOR: Needs MOD (moderate) assist during gaitX2    Therapeutic Activities and Exercises:  PT SAT EOB WITH FAIR SITTING BALANCE 10 MIN WITH STATIC SITTING.. PT C/O DIZZINESS DURING STANDING ACTIVITY X 2 ATTEMPTS.   AM-PAC 6 CLICK ADL   How much help from another person does this patient currently need?   1 = Unable, Total/Dependent Assistance  2 = A lot, Maximum/Moderate Assistance  3 = A little, Minimum/Contact Guard/Supervision  4 = None, Modified [place]/Independent    Putting on and taking off regular lower body clothing? : 2  Bathing (including washing, rinsing, drying)?: 2  Toileting, which includes using toilet, bedpan, or urinal? : 2  Putting on and taking off regular upper body clothing?: 2  Taking care of personal grooming such as brushing teeth?: 2  Eating meals?: 2  Daily Activity Total Score:  "12     AM-PAC Raw Score CMS "G-Code Modifier Level of Impairment Assistance   6 % Total / Unable   7 - 8 CM 80 - 100% Maximal Assist   9-13 CL 60 - 80% Moderate Assist   14 - 19 CK 40 - 60% Moderate Assist   20 - 22 CJ 20 - 40% Minimal Assist   23 CI 1-20% SBA / CGA   24 CH 0% Independent/ Mod I       Patient left HOB elevated with all lines intact, call button in reach, bed alarm on, NURSE notified and FAMILY present    ASSESSMENT:  Shaq Huffman is a 86 y.o. male with a medical diagnosis of Pneumonia of right lower lobe due to infectious organism and presents with DEBILITY AND GENERALIZED WEAKNESS. PT WILL CONTINUE TO BENEFIT FROM SKILLED OT.    Rehab identified problem list/impairments: Rehab identified problem list/impairments: weakness, impaired self care skills, impaired balance, decreased coordination, impaired endurance, impaired functional mobilty, gait instability    Rehab potential is good.    Activity tolerance: Good    Discharge recommendations: Discharge Facility/Level of Care Needs: nursing facility, skilled     Barriers to discharge:      Equipment recommendations: walker, rolling     GOALS:   Multidisciplinary Problems     Occupational Therapy Goals        Problem: Occupational Therapy Goal    Goal Priority Disciplines Outcome Interventions   Occupational Therapy Goal     OT, PT/OT Ongoing, Progressing    Description:  LTGs to be met by 11/18/19  1. Pt will perform LE dressing with Min A  2. Pt will perform UE dressing with SBA  3. Pt will perform toilet t/f with SBA  4. Pt will perform (B) UE ROM exercises 1 x 20 reps                    Plan:  Patient to be seen 3 x/week to address the above listed problems via self-care/home management, therapeutic activities, therapeutic exercises  Plan of Care expires:    Plan of Care reviewed with: patient         Aylin Gregoryariana OT  11/15/2019  "

## 2019-11-16 NOTE — PT/OT/SLP PROGRESS
Speech Language Pathology      Shaq Huffman  MRN: [id number]    Patient not applicable due to lethargy and poor respiratory stasis. Pt unable to maintain an appropriate level of alertness for po trials at this time with short labored breathing. ST to reattempt at this time.    Betty Chun, CCC-SLP

## 2019-11-16 NOTE — PROGRESS NOTES
Pharmacist Renal Dose Adjustment Note    Shaq Huffman is a 86 y.o. male being treated with the medication Pepcid    Patient Data:    Vital Signs (Most Recent):  Temp: 98.4 °F (36.9 °C) (11/15/19 1624)  Pulse: 60 (11/15/19 1700)  Resp: 18 (11/15/19 1624)  BP: (!) 89/59 (11/15/19 1624)  SpO2: 97 % (11/15/19 1624)   Vital Signs (72h Range):  Temp:  [48.2 °F (9 °C)-98.5 °F (36.9 °C)]   Pulse:  [50-92]   Resp:  [17-24]   BP: ()/(55-74)   SpO2:  [93 %-99 %]      Recent Labs   Lab 11/14/19  2357 11/15/19  0438 11/15/19  1137   CREATININE 0.7 0.8 0.8     Serum creatinine: 0.8 mg/dL 11/15/19 1137  Estimated creatinine clearance: 69.4 mL/min    Medication:Pepcid 20 mg PO daily will be changed to Pepcid 20 mg PO twice daily.     Pharmacist's Name: Porsche Eugene  Pharmacist's Extension: [phone]

## 2019-11-16 NOTE — PLAN OF CARE
Problem: Adult Inpatient Plan of Care  Goal: Plan of Care Review  Outcome: Ongoing, Progressing   Pt AAO to self, situation, and place; disoriented to time. VSS. Pt remained free of falls this shift. No complaints of pain or discomfort. Medications administered as ordered. Pt is Afib with controlled rate on monitor. PICC intact, infusing  D5. Tube feedings at 60ml/hr. Accu checks q4hrs. Neuro checks q4hrs. Avasys at bedside. Hourly rounding completed. Pt instructed to call for assistance. POC reviewed. Pt verbalized understanding, needs reinforcement. Will continue to monitor.

## 2019-11-17 VITALS
SYSTOLIC BLOOD PRESSURE: 120 MMHG | RESPIRATION RATE: 20 BRPM | OXYGEN SATURATION: 96 % | HEIGHT: 74 IN | DIASTOLIC BLOOD PRESSURE: 65 MMHG | TEMPERATURE: 101 F | BODY MASS INDEX: 21.62 KG/M2 | WEIGHT: 168.44 LBS | HEART RATE: 87 BPM

## 2019-11-17 PROBLEM — I50.22 SYSTOLIC CHF, CHRONIC: Status: RESOLVED | Noted: 2019-11-13 | Resolved: 2019-11-17

## 2019-11-17 PROBLEM — N39.0 ACUTE LOWER UTI: Status: ACTIVE | Noted: 2019-11-17

## 2019-11-17 PROBLEM — N39.0 ACUTE LOWER UTI: Status: RESOLVED | Noted: 2019-11-17 | Resolved: 2019-11-17

## 2019-11-17 PROCEDURE — 99900035 HC TECH TIME PER 15 MIN (STAT)

## 2019-11-17 PROCEDURE — 94761 N-INVAS EAR/PLS OXIMETRY MLT: CPT

## 2019-11-17 PROCEDURE — 27000221 HC OXYGEN, UP TO 24 HOURS

## 2019-11-17 RX ORDER — IPRATROPIUM BROMIDE AND ALBUTEROL SULFATE 2.5; .5 MG/3ML; MG/3ML
3 SOLUTION RESPIRATORY (INHALATION) EVERY 4 HOURS PRN
Qty: 1 BOX | Refills: 0 | Status: SHIPPED | OUTPATIENT
Start: 2019-11-17 | End: 2020-11-16

## 2019-11-17 NOTE — ASSESSMENT & PLAN NOTE
- pt discharged to [facility] on 11/7/19   - PT/OT consulted and recommended Rehab placement pending course and medical stability  - CM following for DC planning    Severe-- going to hospice

## 2019-11-17 NOTE — ASSESSMENT & PLAN NOTE
- Currently rate controlled   - Continue home Coreg   - Detailed discussion held with patient and son who agree with stopping Coumadin and starting on Eliquis for CVA prophylaxis   - Hold AC for now given potential placement of PEG tube  - Will need to start Eliquis post PEG placement    Comfort care only

## 2019-11-17 NOTE — NURSING
Pt noted resting quietly in bed with eyes closed. Pt family remains at bedside. Pt bathed and turned at this time. Safety intact. 0 s/s acute distress noted. Bed in low position. Call light in reach sr up x2. Will cont to monitor and eval throughout shift.

## 2019-11-17 NOTE — ASSESSMENT & PLAN NOTE
- Placed in OBS and transitioned to inpatient  - Recently treated for aspiration PNA and discharged to [facility] on Augmentin   - Failed MBSS and refused PEG on last admission with pureed diet and honey thick liquids recommended   - BC show NGTD  - Continue IV ABX  - Supplemental O2  - Scheduled nebs  - Strict NPO  - Supportive care  - Patient is a DNR    Family d/garo all tx-- comfort care only  Hospice in am

## 2019-11-17 NOTE — PROGRESS NOTES
"Went over discharge plan to [facility] with patient's daughter and significant other.   Patient;s family verbalized understanding and has no questions in regards to discharge.  IV removed, catheter intact.  Patient transported via [facility] with O2 in place. Family will meet at the facility.  /65 (BP Location: Right arm, Patient Position: Lying)   Pulse 87   Temp (!) 100.8 °F (38.2 °C) (Axillary)   Resp 20   Ht 6' 2" (1.88 m)   Wt 76.4 kg (168 lb 6.9 oz)   SpO2 96%   BMI 21.63 kg/m²     "

## 2019-11-17 NOTE — ASSESSMENT & PLAN NOTE
- normalized   - Slowly improving; likely secondary to dehydration/decreased oral intake  - Continue free water flushes at 200 cc q 4 hours  - Continue gentle IVFs with D5W at 50 cc/hour  - Serial BMP to avoid precipitous drop    persists

## 2019-11-17 NOTE — PLAN OF CARE
Dr. Allan contacted [facility] to admit patient to [facility].  Jose Aminley from [facility] signed the patient up and left.   returned a message from Mr. Mckeon. He stated patient was set up to transfer. Obtain number for nurse to call report.  Advised charge nurse to call for ambulance transport.  She advised we needed a discharge order before she could call.  Spoke with Dr. Allan.  He decided to transport patient tomorrow morning.  Preference signed, referral sent via [facility].       11/16/19 9676   Post-Acute Status   Post-Acute Authorization Home Health/Hospice   Home Health/Hospice Status Set-up Complete

## 2019-11-17 NOTE — PLAN OF CARE
Family made the decision to put patient on palliative care. Social work consulted for Hospice. Patient to be discharged to hospice center tomorrow. Report to be called to [phone] tomorrow which was passed on to night nurse. Dr. Allan gave verbal order to discontinue all medications, Avasys, tele monitor, NG tube, IV fluids and glucose monitoring. Orders were carried out. Patient appears to be resting comfortably. VSS. Family has been regularly updated and all questions answered to satisfaction. Will continue to monitor.

## 2019-11-17 NOTE — NURSING
During hourly rounds, pt was nonresponsive and shallow breathing at 8 breaths/min. Abd muscle use and several second periods of apnea noted between each breath. Rapid response called. VSS. . Dr. Allan gave verbal orders for STAT chest xray and chest physiotherapy. RT performed deep suction and removed 50cc of red frothy secretions. Family was present and updated by this nurse and Dr. Allan. Palliative care was discussed but no decision made at that time, as spouse and daughter wanted to talk it over with the rest of the family.

## 2019-11-17 NOTE — PROGRESS NOTES
[facility] Hospital Medicine  Progress Note    Patient Name: Shaq Huffman  MRN: [id number]  Patient Class: IP- Inpatient   Admission Date: 11/10/2019  Length of Stay: 5 days  Attending Physician: Kalen Allan MD  Primary Care Provider: Provider Not[facility]ystem        Subjective:     Principal Problem:Pneumonia of right lower lobe due to infectious organism        HPI:   Shaq Huffman is a 86 y.o. male patient with a h/o Parkinson's dz, COPD, and Atrial fibrillation who presents to the Emergency Department for evaluation of worsening confusion which onset gradually several days ago. Symptoms are constant and moderate in severity. No mitigating or exacerbating factors reported. Associated sxs include generalized weakness, cough  (productive), decreased appetite, malaise, and dark urine. Patient denies any fever, chills, abd pain, SOB, CP, frequency/urgency, and all other sxs at this time. No prior Tx reported. No further complaints or concerns at this time. Pt was hospitalized at [facility] from 10/29/19-11/7/19 and treated for acute respiratory failure and aspiration pneumonia.  Pt failed MBSS during recent admission and refused PEG placement at that time.  Pt was discharged to [facility] on Augmentin. Pt is a full code and Shabbir Huffman (son) at [phone] and Alina Atwood (significant other) at [phone] are the surrogate decision makers.  ER work up showed: Na 151, Cl 117, BUN 32, Alk Phos 181, Bili 1.7, and Albumin 2.5.   Chest xray showed increasing patchy density at the right base suspicious for pneumonia with stable consolidation medial right base.  CT of head showed no acute intracranial abnormality with atrophy present and old lacunar insult suspected on the right.  UA positive for infectious process.  Hospital Medicine contacted for admission to Observation Unit.     Overview/Hospital Course:  On 11/10 patient was placed in OBS secondary to Aspiration PNA, UTI, AMS, and Coumadin  toxicity with an initial INR of 7.0.  CT head showed no acute intracranial abnormality.  Atrophy is present.  Old lacunar insult suspected on the right.  Patient was recently hospitalized here for aspiration PNA requiring intubation and mechanical ventilation at that time.  After stabilization patient was discharged to [facility] for continued PT/OT/ST.  The patient was evaluated by ST on his last admission who recommended strict NPO given continued risk for aspiration pneumonia.  A PEG tube was recommended at that time, however, patient adamantly refused, and wanted to continue eating by mouth having understood the risk of oral intake.  BC were drawn and patient was started on IV antibiotics.  He was also given Vitamin K given active nose bleeding with elevated INR.    As of 11/11 Multiple acute events noted overnight including placement of an NGT.  Patient with worsening respiratory distress requiring increased amounts of oxygen, currently on ventimask with 15L O2.  Nose bleeding ceased after administration of vitamin K and INR has improved to 3.9.  Per d/w patient's son who is present at the , he is now a DNR.  Son agrees to conservative treatment of UTI and pneumonia however he does not want any resuscitative measures including no CPR, no emergency drugs, no defibrillation, and no intubation/mechanical ventilation.  Of note, son reports patient was recently diagnosed with Parkinson's within the past year and his significant other reports associated difficulty swallowing for the past 2-3 months.  Prior to his most recent hospitalization here patient was an active 86-year-old who traveled here from [place].  ST following and recommended strict NPO status.  Son states if patient stabilizes he would reconsider PEG placement.  Registered dietitian consulted for tube feeding recommendations and patient will be started on Isosource 1.5 at 60 cc/hour with 100 cc of free water q 4 hours.  Will also start gentle IVFs  with D5W at 50 cc/hour.  BC show NGTD.  UC pending.  Will plan to DC zuluaga cath in AM.  PT/OT following and recommended SNF versus rehab once medically stable.  Patient is currently in guarded condition.     As of 11/12 Patient's respiratory status has improved slightly from yesterday.  Still requiring high flow oxygen.  D/W RT who will attempt to wean as tolerated to NC.  Na remains elevated, will increase free H2O flushes to 200 cc q 4 hours and continue D5W IVFs.  BC and UC shows NGTD.  Will continue Rocephin and Azithromycin for PNA.  PT/OT/ST continues to follow.  Patient is severely deconditioned and remains in guarded condition.      As of 11/13 Patient is much more alert today and answering questions appropriately.  ST continues to follow and recommends strict NPO.  Discussed treatment options at length with patient and his son who is present at the , and patient is currently requesting PEG placement if possible.  Will consult GI for evaluation.  Continue IV ABX for PNA.  Plan to hold AC for now given possibility of PEG placement.  Patient and son agree to transitioning Coumadin to Eliquis after PEG placement.  Cardiology consult in AM for procedure clearance.    On 11/14/19, pt alert and answering questions appropriately when questioned.  GI consulted with evaluation for PEG placement completed and cardiac clearance completed.  Placement of PEG recommended with moderate cardiac risk assessed.  PEG placement with GI vs IR discussed.  IR consulted to evaluate for placement.  Will await recommendations. Pt continues to experience productive cough and difficulty with expectoration.  Pt encouraged to clear secretions and Yankauer at bedside.  Current plan of care continued.      On 11/15/19, pt evaluated by IR for PEG placement with procedure planned for 11/18/19. Coumadin to be held for approximately 5 days prior to procedure.  NGT remains in place with pt tolerating tube feeding.  Plan of care discussed with  family at bedside and understanding verbalized.  Current plan of care contiuned.      11/16- pt known to me from last admission. Chart reviewed, he had a Rapid Response called this am, he was not responding momentarily but VSS, afebrile, O2 Sats 92-93% on 2 L. BS normal. He is DNR. He had obvious chest gurgling and had difficulty expectorating it. deep ENT suction performed- lots of old blood mixed with mucus. He became responsive but looked very frail and cachectic, moribund. D/w pts' family- Ms Lind and his daughter Karla and updated them about his condition. They understand and just wished to keep him comfortable and do not want any further Tx and agreed with hospice and have signed up with [facility]. NGT removed and he appears more relaxed but constantly sleepy. Family at bedside. Will d/c to hospice in am.    Interval History: pt known to me from last admission. Chart reviewed, he had a Rapid Response called this am, he was not responding momentarily but VSS, afebrile, O2 Sats 92-93% on 2 L. BS normal. He is DNR. He had obvious chest gurgling and had difficulty expectorating it. deep ENT suction performed- lots of old blood mixed with mucus. He became responsive but looked very frail and cachectic, moribund. D/w pts' family- Ms Lind and his daughter Karla and updated them about his condition. They understand and just wished to keep him comfortable and do not want any further Tx and agreed with hospice and have signed up with [facility]. NGT removed and he appears more relaxed but constantly sleepy. Family at bedside. Will d/c to hospice in am.    Review of Systems   Unable to perform ROS: Mental status change     Objective:     Vital Signs (Most Recent):  Temp: 98.6 °F (37 °C) (11/16/19 1125)  Pulse: 72 (11/16/19 1711)  Resp: 18 (11/16/19 1711)  BP: (!) 110/59 (11/16/19 1711)  SpO2: 99 % (11/16/19 1711) Vital Signs (24h Range):  Temp:  [97.8 °F (36.6 °C)-99.2 °F (37.3 °C)] 98.6 °F (37  °C)  Pulse:  [65-89] 72  Resp:  [18-22] 18  SpO2:  [92 %-100 %] 99 %  BP: ()/(45-67) 110/59     Weight: 76.4 kg (168 lb 6.9 oz)  Body mass index is 21.63 kg/m².    Intake/Output Summary (Last 24 hours) at 11/16/2019 1832  Last data filed at 11/16/2019 1200  Gross per 24 hour   Intake 3090 ml   Output 1000 ml   Net 2090 ml      Physical Exam   Constitutional: He appears cachectic. He appears toxic. He appears ill.   Frail, cachectic, elderly man, appears moribund   HENT:   Head: Normocephalic.   Nose: Nose normal.   Mouth/Throat: Mucous membranes are dry. He has dentures.   NGT in place   Eyes: Conjunctivae are normal.   Neck: Normal range of motion. Neck supple.   Cardiovascular: Normal rate and intact distal pulses. An irregularly irregular rhythm present.   No murmur heard.  Pulmonary/Chest: Effort normal and breath sounds normal. No accessory muscle usage or stridor. No tachypnea. No respiratory distress. He has no wheezes. He has no rales.   Coarse throughout with scattered wheezing and rhonchi to all lobes.  Comfortable on 6L NC.  Loose, non-productive cough noted.   Abdominal: Soft. Bowel sounds are normal. He exhibits no distension. There is no tenderness.   Genitourinary:   Genitourinary Comments: Vallejo catheter present    Musculoskeletal: Normal range of motion.   Generalized weakness; LUE PICC site WNL with DSG CDI.   Neurological: He is alert. GCS eye subscore is 3. GCS verbal subscore is 4. GCS motor subscore is 6.   Awake and alert, oriented to person and place.   Skin: Skin is warm and dry.   Skin is thin with skin tears present to BUE   Psychiatric: He has a normal mood and affect.   Nursing note and vitals reviewed.      Significant Labs:   BMP:   Recent Labs   Lab 11/16/19  1128   *      K 5.2*      CO2 31*   BUN 39*   CREATININE 0.8   CALCIUM 8.3*     CBC:   Recent Labs   Lab 11/15/19  0438 11/16/19  0433   WBC 8.00 8.65   HGB 10.8* 10.1*   HCT 36.0* 33.6*    150  "    Magnesium:   All pertinent labs within the past 24 hours have been reviewed.    Significant Imaging: I have reviewed all pertinent imaging results/findings within the past 24 hours.      Assessment/Plan:      * Pneumonia of right lower lobe due to infectious organism  - Placed in OBS and transitioned to inpatient  - Recently treated for aspiration PNA and discharged to [facility] on Augmentin   - Failed MBSS and refused PEG on last admission with pureed diet and honey thick liquids recommended   - BC show NGTD  - Continue IV ABX  - Supplemental O2  - Scheduled nebs  - Strict NPO  - Supportive care  - Patient is a DNR    Family d/garo all tx-- comfort care only  Hospice in am    Systolic CHF, chronic  - No known history prior to hospitalization in October  - ECHO on 10/30 showed EF of 35-40% with grade 3 diastolic dysfunction, and pulmonary hypertension with an estimated PA systolic pressure is greater than 52 mmHg.   - Currently appears compensated  - Continue BB  - Hold home Lasix given dehydration with need for gentle IVFs  - Cardiology consulted-clearance with moderate cardiac risk for PEG placement   - Tele monitoring      Macrocytic anemia  - B12 1763   - Folate 7.7  - H/H stable  - Daily CBC      Dehydration  - Continue TF per dietician recs  - Free water flushes, 200 cc q 4 hours  - Gentle IVFs  - Monitor      Hypernatremia  - normalized   - Slowly improving; likely secondary to dehydration/decreased oral intake  - Continue free water flushes at 200 cc q 4 hours  - Continue gentle IVFs with D5W at 50 cc/hour  - Serial BMP to avoid precipitous drop    persists    Dysphagia  - Failed MBSS and refused PEG on last admission with pureed diet and honey thick liquids recommended   - Now with recurrent PNA, aware of the risks associated with continued oral intake and "wanted to try food" upon last DC  - ST following  - Strict NPO  - Continue TF via NGT  - Dietician consulted for TF recs  - HOB elevated >30 AAT  - " Patient would like to proceed with PEG placement  - GI consulted for evaluation- requests cardiac evaluation  - Cardiology consulted with PEG placement recommended at moderate cardiac risk- PEG placement per GI vs IR discussed   - IR consulted to evaluate for PEG placement- procedure planned for 11/18/19  - Hold AC for now pending ability to place PEG- must be held for at least 5 days prior to procedure       Malnutrition of moderate degree  - Dietician following  - Continue TF and free water flushes per recs      Altered mental status  - In the setting of PNA  - CT of head showed no acute intracranial abnormality and atrophy is present with old lacunar insult suspected on the right.  - Improving  - neuro checks   - Supportive care      Physical deconditioning  - pt discharged to [facility] on 11/7/19   - PT/OT consulted and recommended Rehab placement pending course and medical stability  - CM following for DC planning    Severe-- going to hospice    Parkinson disease  - Continue home Sinemet   - Supportive care      Acute respiratory failure with hypoxia  - Multifactorial given aspiration PNA and severely deconditioned state  - Continue supplemental O2, will attempt to wean as tolerated  - Per son he is a DNR and does not want intubation/mechanical ventilation  - Treat PNA  - Scheduled nebs  - Supportive care  - Condition guarded    Persists, on O2      Atrial fibrillation  - Currently rate controlled   - Continue home Coreg   - Detailed discussion held with patient and son who agree with stopping Coumadin and starting on Eliquis for CVA prophylaxis   - Hold AC for now given potential placement of PEG tube  - Will need to start Eliquis post PEG placement    Comfort care only      VTE Risk Mitigation (From admission, onward)    None                Kalen Allan MD  Department of Hospital Medicine   [facility]

## 2019-11-17 NOTE — SUBJECTIVE & OBJECTIVE
Interval History: pt known to me from last admission. Chart reviewed, he had a Rapid Response called this am, he was not responding momentarily but VSS, afebrile, O2 Sats 92-93% on 2 L. BS normal. He is DNR. He had obvious chest gurgling and had difficulty expectorating it. deep ENT suction performed- lots of old blood mixed with mucus. He became responsive but looked very frail and cachectic, moribund. D/w pts' family- Ms Lind and his daughter Karla and updated them about his condition. They understand and just wished to keep him comfortable and do not want any further Tx and agreed with hospice and have signed up with [facility]. NGT removed and he appears more relaxed but constantly sleepy. Family at bedside. Will d/c to hospice in am.    Review of Systems   Unable to perform ROS: Mental status change     Objective:     Vital Signs (Most Recent):  Temp: 98.6 °F (37 °C) (11/16/19 1125)  Pulse: 72 (11/16/19 1711)  Resp: 18 (11/16/19 1711)  BP: (!) 110/59 (11/16/19 1711)  SpO2: 99 % (11/16/19 1711) Vital Signs (24h Range):  Temp:  [97.8 °F (36.6 °C)-99.2 °F (37.3 °C)] 98.6 °F (37 °C)  Pulse:  [65-89] 72  Resp:  [18-22] 18  SpO2:  [92 %-100 %] 99 %  BP: ()/(45-67) 110/59     Weight: 76.4 kg (168 lb 6.9 oz)  Body mass index is 21.63 kg/m².    Intake/Output Summary (Last 24 hours) at 11/16/2019 1832  Last data filed at 11/16/2019 1200  Gross per 24 hour   Intake 3090 ml   Output 1000 ml   Net 2090 ml      Physical Exam   Constitutional: He appears cachectic. He appears toxic. He appears ill.   Frail, cachectic, elderly man, appears moribund   HENT:   Head: Normocephalic.   Nose: Nose normal.   Mouth/Throat: Mucous membranes are dry. He has dentures.   NGT in place   Eyes: Conjunctivae are normal.   Neck: Normal range of motion. Neck supple.   Cardiovascular: Normal rate and intact distal pulses. An irregularly irregular rhythm present.   No murmur heard.  Pulmonary/Chest: Effort normal and breath sounds  normal. No accessory muscle usage or stridor. No tachypnea. No respiratory distress. He has no wheezes. He has no rales.   Coarse throughout with scattered wheezing and rhonchi to all lobes.  Comfortable on 6L NC.  Loose, non-productive cough noted.   Abdominal: Soft. Bowel sounds are normal. He exhibits no distension. There is no tenderness.   Genitourinary:   Genitourinary Comments: Vallejo catheter present    Musculoskeletal: Normal range of motion.   Generalized weakness; LUE PICC site WNL with DSG CDI.   Neurological: He is alert. GCS eye subscore is 3. GCS verbal subscore is 4. GCS motor subscore is 6.   Awake and alert, oriented to person and place.   Skin: Skin is warm and dry.   Skin is thin with skin tears present to BUE   Psychiatric: He has a normal mood and affect.   Nursing note and vitals reviewed.      Significant Labs:   BMP:   Recent Labs   Lab 11/16/19  1128   *      K 5.2*      CO2 31*   BUN 39*   CREATININE 0.8   CALCIUM 8.3*     CBC:   Recent Labs   Lab 11/15/19  0438 11/16/19  0433   WBC 8.00 8.65   HGB 10.8* 10.1*   HCT 36.0* 33.6*    150     Magnesium:   All pertinent labs within the past 24 hours have been reviewed.    Significant Imaging: I have reviewed all pertinent imaging results/findings within the past 24 hours.

## 2019-11-17 NOTE — DISCHARGE SUMMARY
[facility] Hospital Medicine  Discharge Summary      Patient Name: Shaq Huffman  MRN: [id number]  Admission Date: 11/10/2019  Hospital Length of Stay: 6 days  Discharge Date and Time:  11/17/2019 9:19 AM  Attending Physician: Kalen Allan MD   Discharging Provider: Kalen Allan MD  Primary Care Provider: Provider Notinsystem      HPI:    Shaq Huffman is a 86 y.o. male patient with a h/o Parkinson's dz, COPD, and Atrial fibrillation who presents to the Emergency Department for evaluation of worsening confusion which onset gradually several days ago. Symptoms are constant and moderate in severity. No mitigating or exacerbating factors reported. Associated sxs include generalized weakness, cough  (productive), decreased appetite, malaise, and dark urine. Patient denies any fever, chills, abd pain, SOB, CP, frequency/urgency, and all other sxs at this time. No prior Tx reported. No further complaints or concerns at this time. Pt was hospitalized at [facility] from 10/29/19-11/7/19 and treated for acute respiratory failure and aspiration pneumonia.  Pt failed MBSS during recent admission and refused PEG placement at that time.  Pt was discharged to [facility] on Augmentin. Pt is a full code and Shabbir Huffman (son) at [phone] and Alina Atwood (significant other) at [phone] are the surrogate decision makers.  ER work up showed: Na 151, Cl 117, BUN 32, Alk Phos 181, Bili 1.7, and Albumin 2.5.   Chest xray showed increasing patchy density at the right base suspicious for pneumonia with stable consolidation medial right base.  CT of head showed no acute intracranial abnormality with atrophy present and old lacunar insult suspected on the right.  UA positive for infectious process.  Hospital Medicine contacted for admission to Observation Unit.     * No surgery found *      Hospital Course:   On 11/10 patient was placed in OBS secondary to Aspiration PNA, UTI, AMS, and Coumadin toxicity with an  initial INR of 7.0.  CT head showed no acute intracranial abnormality.  Atrophy is present.  Old lacunar insult suspected on the right.  Patient was recently hospitalized here for aspiration PNA requiring intubation and mechanical ventilation at that time.  After stabilization patient was discharged to [facility] for continued PT/OT/ST.  The patient was evaluated by ST on his last admission who recommended strict NPO given continued risk for aspiration pneumonia.  A PEG tube was recommended at that time, however, patient adamantly refused, and wanted to continue eating by mouth having understood the risk of oral intake.  BC were drawn and patient was started on IV antibiotics.  He was also given Vitamin K given active nose bleeding with elevated INR.    As of 11/11 Multiple acute events noted overnight including placement of an NGT.  Patient with worsening respiratory distress requiring increased amounts of oxygen, currently on ventimask with 15L O2.  Nose bleeding ceased after administration of vitamin K and INR has improved to 3.9.  Per d/w patient's son who is present at the , he is now a DNR.  Son agrees to conservative treatment of UTI and pneumonia however he does not want any resuscitative measures including no CPR, no emergency drugs, no defibrillation, and no intubation/mechanical ventilation.  Of note, son reports patient was recently diagnosed with Parkinson's within the past year and his significant other reports associated difficulty swallowing for the past 2-3 months.  Prior to his most recent hospitalization here patient was an active 86-year-old who traveled here from [place].  ST following and recommended strict NPO status.  Son states if patient stabilizes he would reconsider PEG placement.  Registered dietitian consulted for tube feeding recommendations and patient will be started on Isosource 1.5 at 60 cc/hour with 100 cc of free water q 4 hours.  Will also start gentle IVFs with D5W at 50  cc/hour.  BC show NGTD.  UC pending.  Will plan to DC zuluaga cath in AM.  PT/OT following and recommended SNF versus rehab once medically stable.  Patient is currently in guarded condition.     As of 11/12 Patient's respiratory status has improved slightly from yesterday.  Still requiring high flow oxygen.  D/W RT who will attempt to wean as tolerated to NC.  Na remains elevated, will increase free H2O flushes to 200 cc q 4 hours and continue D5W IVFs.  BC and UC shows NGTD.  Will continue Rocephin and Azithromycin for PNA.  PT/OT/ST continues to follow.  Patient is severely deconditioned and remains in guarded condition.      As of 11/13 Patient is much more alert today and answering questions appropriately.  ST continues to follow and recommends strict NPO.  Discussed treatment options at length with patient and his son who is present at the , and patient is currently requesting PEG placement if possible.  Will consult GI for evaluation.  Continue IV ABX for PNA.  Plan to hold AC for now given possibility of PEG placement.  Patient and son agree to transitioning Coumadin to Eliquis after PEG placement.  Cardiology consult in AM for procedure clearance.    On 11/14/19, pt alert and answering questions appropriately when questioned.  GI consulted with evaluation for PEG placement completed and cardiac clearance completed.  Placement of PEG recommended with moderate cardiac risk assessed.  PEG placement with GI vs IR discussed.  IR consulted to evaluate for placement.  Will await recommendations. Pt continues to experience productive cough and difficulty with expectoration.  Pt encouraged to clear secretions and Yankauer at bedside.  Current plan of care continued.      On 11/15/19, pt evaluated by IR for PEG placement with procedure planned for 11/18/19. Coumadin to be held for approximately 5 days prior to procedure.  NGT remains in place with pt tolerating tube feeding.  Plan of care discussed with family at  bedside and understanding verbalized.  Current plan of care contiuned.      11/16- pt known to me from last admission. Chart reviewed, he had a Rapid Response called this am, he was not responding momentarily but VSS, afebrile, O2 Sats 92-93% on 2 L. BS normal. He is DNR. He had obvious chest gurgling and had difficulty expectorating it. deep ENT suction performed- lots of old blood mixed with mucus. He became responsive but looked very frail and cachectic, moribund. D/w pts' family- Ms Lind and his daughter Karla and updated them about his condition. They understand and just wished to keep him comfortable and do not want any further Tx and agreed with hospice and have signed up with [facility]. NGT removed and he appears more relaxed but constantly sleepy. Family at bedside. Will d/c to hospice in am.  11/17- pt remains somnolent with waxing and waning alertness, with little responsiveness, severely emaciated, cachectic. Chest gurgles present, prognosis very poor. Family at bedside and ready for him to be discharged to hospice. He was seen and examined and deemed appropriate for discharge to [facility] today.      Consults:   Consults (From admission, onward)        Status Ordering Provider     Inpatient consult to Cardiology  Once     Provider:  Jonathan Johnson MD    Completed LUNA GUY     Inpatient consult to Gastroenterology  Once     Provider:  Ad Lyman III, MD    Completed LUNA GUY     Inpatient consult to Interventional Radiology  Once     Provider:  (Not yet assigned)    Completed AQUILES FUNEZ     Inpatient consult to Registered Dietitian/Nutritionist  Once     Provider:  (Not yet assigned)    Completed LUNA GUY     Inpatient consult to Social Work  Once     Provider:  (Not yet assigned)    Completed JOE WATTS     IP consult to case management  Once     Provider:  (Not yet assigned)    Completed ISAAC RODRIGUEZ          No new Assessment &  Plan notes have been filed under this hospital service since the last note was generated.  Service: Hospital Medicine    Final Active Diagnoses:    Diagnosis Date Noted POA    PRINCIPAL PROBLEM:  Pneumonia of right lower lobe due to infectious organism [J18.1] 11/10/2019 Yes    Dysphagia [R13.10] 11/11/2019 Yes    Hypernatremia [E87.0] 11/11/2019 Yes    Dehydration [E86.0] 11/11/2019 Yes    Macrocytic anemia [D53.9] 11/11/2019 Yes    Altered mental status [R41.82] 11/10/2019 Yes    Malnutrition of moderate degree [E44.0] 11/10/2019 Yes    Physical deconditioning [R53.81] 11/01/2019 Yes    Parkinson disease [G20] 10/29/2019 Yes     Chronic    Atrial fibrillation [I48.91] 10/29/2019 Yes    Acute respiratory failure with hypoxia [J96.01] 10/29/2019 Yes      Problems Resolved During this Admission:    Diagnosis Date Noted Date Resolved POA    Acute lower UTI [N39.0] 11/17/2019 11/17/2019 Yes    Systolic CHF, chronic [I50.22] 11/13/2019 11/17/2019 Yes    UTI (urinary tract infection) [N39.0] 11/10/2019 11/12/2019 Yes    Supratherapeutic INR [R79.1] 11/10/2019 11/13/2019 Yes       Discharged Condition: poor    Disposition: Hospice/Home    Follow Up:    Patient Instructions:      Diet Adult Regular     Activity as tolerated       Significant Diagnostic Studies: Labs:   BMP:   Recent Labs   Lab 11/16/19  0011 11/16/19  0433 11/16/19  1128   * 120* 126*    136 136   K 5.0 5.0 5.2*    100 101   CO2 32* 31* 31*   BUN 36* 37* 39*   CREATININE 0.8 0.8 0.8   CALCIUM 8.5* 8.6* 8.3*   , CMP   Recent Labs   Lab 11/16/19  0011 11/16/19  0433 11/16/19  1128    136 136   K 5.0 5.0 5.2*    100 101   CO2 32* 31* 31*   * 120* 126*   BUN 36* 37* 39*   CREATININE 0.8 0.8 0.8   CALCIUM 8.5* 8.6* 8.3*   ANIONGAP 5* 5* 4*   ESTGFRAFRICA >60 >60 >60   EGFRNONAA >60 >60 >60   , CBC   Recent Labs   Lab 11/16/19  0433   WBC 8.65   HGB 10.1*   HCT 33.6*      All labs within the past 24  hours have been reviewed  Radiology: X-Ray: CXR: X-Ray Chest 1 View (CXR):   Results for orders placed or performed during the hospital encounter of 11/10/19   X-Ray Chest 1 View    Narrative    EXAMINATION:  XR CHEST 1 VIEW    CLINICAL HISTORY:  clinical deterioration;    FINDINGS:  Comparison study 11/15/2019.  Stable positioning support tubes and lines.  Again, small right pleural effusion blunts the costophrenic angle.  There is progressive peripheral right lung base airspace consolidation consolidation.  Pneumonia?  Follow-up.  Left lung is clear.  The heart size is borderline enlarged.  Atherosclerotic tortuous aorta.  No vascular congestion.      Impression    See above.      Electronically signed by: Maninder Woodruff MD  Date:    11/16/2019  Time:    13:05          Medications:  Reconciled Home Medications:      Medication List      START taking these medications    albuterol-ipratropium 2.5 mg-0.5 mg/3 mL nebulizer solution  Commonly known as:  DUO-NEB  Take 3 mLs by nebulization every 4 (four) hours as needed for Wheezing or Shortness of Breath. Rescue        CONTINUE taking these medications    brimonidine 0.15 % OPTH DROP 0.15 % ophthalmic solution  Commonly known as:  ALPHAGAN  1 drop 3 (three) times daily.     SIMBRINZA OPHT  Apply 1 drop to eye 2 (two) times daily. 1 DROP IN RIGHT  EYE        STOP taking these medications    albuterol 2.5 mg /3 mL (0.083 %) nebulizer solution  Commonly known as:  PROVENTIL     amoxicillin-clavulanate 875-125mg 875-125 mg per tablet  Commonly known as:  AUGMENTIN     carbidopa-levodopa  mg  mg per tablet  Commonly known as:  SINEMET     carvedilol 6.25 MG tablet  Commonly known as:  COREG     famotidine 20 MG tablet  Commonly known as:  PEPCID     fluticasone propionate 110 mcg/actuation inhaler  Commonly known as:  FLOVENT HFA     furosemide 20 MG tablet  Commonly known as:  LASIX     guaiFENesin 600 mg 12 hr tablet  Commonly known as:  MUCINEX     lisinopril  20 MG tablet  Commonly known as:  PRINIVIL,ZESTRIL     polyethylene glycol 17 gram Pwpk  Commonly known as:  GLYCOLAX     PRESERVISION LUTEIN ORAL     vitamin D 1000 units Tab  Commonly known as:  VITAMIN D3     warfarin 2.5 MG tablet  Commonly known as:  COUMADIN            Indwelling Lines/Drains at time of discharge:   Lines/Drains/Airways     Peripherally Inserted Central Catheter Line                 PICC Double Lumen 10/30/19 0445 left brachial 18 days          Drain                 Urethral Catheter 10/29/19 1440 16 Fr. 18 days                Time spent on the discharge of patient: 47 minutes  Patient was seen and examined on the date of discharge and determined to be suitable for discharge.         Kalen Allan MD  Department of Hospital Medicine  [facility]

## 2020-11-18 NOTE — ASSESSMENT & PLAN NOTE
10/29:  Likely 2/2 to aspiration PNA  Sputum culture pending  Blood culture pending  On vanc, cefepime, and flagyl  Continue to wean vent as tolerated   Reviewed blood gas     10/30:  Currently on vent   Sputum cultures pending  Continue current antibiotics   Wean vent as tolerated   Reviewed blood gas today  Will order procal for trend in am     S/p vent support, continue supportive care  Continue steroids    improving     No pertinent past medical history

## 2022-09-03 NOTE — ASSESSMENT & PLAN NOTE
- Multifactorial given aspiration PNA and severely deconditioned state  - Continue supplemental O2, will attempt to wean as tolerated  - Per son he is a DNR and does not want intubation/mechanical ventilation  - Treat PNA  - Scheduled nebs  - Supportive care  - Condition guarded    Persists, on O2     nonsmoker

## 2022-11-17 NOTE — PROGRESS NOTES
[facility] Hospital Medicine  Progress Note    Patient Name: Shaq Huffman  MRN: [id number]  Patient Class: IP- Inpatient   Admission Date: 10/29/2019  Length of Stay: 6 days  Attending Physician: Kalen Allan MD  Primary Care Provider: Provider [employer]        Subjective:     Principal Problem:Aspiration pneumonia of right lower lobe due to gastric secretions        HPI:  Patient is an 85 y/o male with a PMH of parkinsons, afib, GERD, COPD presents as transfer from [facility] for respiratory failure. Patient was intubated prior to arrival and history was obtained by partner. She reports her and patient were on a cruise leaving out of [place] and patient developed some breathing difficulty. Cruise healthcare professionals were called patient was taken to [facility]. He was intubated there due to respiratory insufficiency with hypercarbia and transferred to [facility]. She denied any sick contacts or recent hospitalizations.     Overview/Hospital Course:  10/30:  Still on vent, cont current antibiotics, wean vent as tolerated    10/31:  Extubated this am, will continue to monitor    11/1:   Patient doing well, was informed by ICU staff that girlfriend states patient is a DNR. Discussion was had with Son (Shabbir Huffman) who is power of  [phone] and he confirmed DNR status. Patient likely to be stepped down from ICU today.   11/2: pt seen and examined, chart reviewed, d/w pt's partner and his son. Frail elderly man lying in bed, appears sick. NC and NGT in place, getting TF. CXR shows large R sided pneumonia, no dentures at present. WBC high with L shift. Bun up to 50.   11/3- looks lot better, more alert and responsive. Talking, getting TF via NGT, tolerating it well. Did PT/OT and moving all 4 ext in bed. Bun increased-- hence lasix decreased and free water increased via NGT. Hopefully will be able to take oral food in a day or two.   11/4- continues to make  progress, swallow improved, NG d/garo. Was able to eat pureed diet and swallow pills well, no sore throat. Getting stronger, will continue PT/OT. Will give inj B12 IM.    Interval History: continues to make progress, swallow improved, NG d/garo. Was able to eat pureed diet and swallow pills well, no sore throat. Getting stronger, will continue PT/OT. Will give inj B12 IM.    Review of Systems   Unable to perform ROS: Acuity of condition   Constitutional: Positive for activity change and appetite change. Negative for fatigue and fever.   HENT: Positive for congestion.    Eyes: Negative.    Respiratory: Positive for cough and shortness of breath.    Cardiovascular: Negative for palpitations and leg swelling.   Gastrointestinal: Negative.  Negative for nausea and vomiting.   Endocrine: Negative.    Genitourinary: Negative.    Musculoskeletal: Positive for gait problem.   Skin: Negative for rash.   Neurological: Positive for weakness.     Objective:     Vital Signs (Most Recent):  Temp: 98.2 °F (36.8 °C) (11/04/19 1535)  Pulse: 84 (11/04/19 1700)  Resp: 20 (11/04/19 1535)  BP: 134/89 (11/04/19 1535)  SpO2: (!) 93 % (11/04/19 1535) Vital Signs (24h Range):  Temp:  [96.9 °F (36.1 °C)-98.2 °F (36.8 °C)] 98.2 °F (36.8 °C)  Pulse:  [] 84  Resp:  [14-22] 20  SpO2:  [89 %-95 %] 93 %  BP: (122-152)/(60-99) 134/89     Weight: 69.8 kg (153 lb 14.1 oz)  Body mass index is 19.23 kg/m².    Intake/Output Summary (Last 24 hours) at 11/4/2019 1902  Last data filed at 11/4/2019 1200  Gross per 24 hour   Intake 746 ml   Output 2000 ml   Net -1254 ml      Physical Exam   Constitutional: He appears lethargic. He has a sickly appearance. No distress. Nasal cannula in place.   Frail elderly man looks better, NGT out   HENT:   Head: Normocephalic and atraumatic.   edentulous   Eyes: Pupils are equal, round, and reactive to light. Conjunctivae and EOM are normal.   Neck: Normal range of motion. Neck supple.   Cardiovascular: Exam reveals  no gallop and no friction rub.   No murmur heard.  Irregular irregular   Pulmonary/Chest: No stridor. No respiratory distress. He has no wheezes. He has rales.   Abdominal: Soft. Bowel sounds are normal. He exhibits no distension and no mass. There is no tenderness. There is no guarding.   Genitourinary:   Genitourinary Comments: deferred   Musculoskeletal: He exhibits no edema.   Neurological: He appears lethargic.   Skin: Skin is warm. No rash noted. He is not diaphoretic.   Nursing note and vitals reviewed.      Significant Labs:   Blood Culture:   BMP:   Recent Labs   Lab 11/04/19  0408   *   *   K 3.1*      CO2 29   BUN 77*   CREATININE 1.0   CALCIUM 9.8   MG 1.9     CBC:   Recent Labs   Lab 11/03/19  0445 11/04/19  0408   WBC 10.64 11.03   HGB 13.3* 14.0   HCT 40.0 43.1    186     Respiratory Culture:   All pertinent labs within the past 24 hours have been reviewed.    Significant Imaging: I have reviewed all pertinent imaging results/findings within the past 24 hours.      Assessment/Plan:      * Aspiration pneumonia of right lower lobe due to gastric secretions  10/31:  Continue vanc, cefepime, and flagyl  procalcitonin trending up  WBC within normal limits  Chest xray ordered  Continue to monitor for response  Repeat procalcitonin in am  Sputum culture normal mikel    11/1:  Currently on vanc, cefepime and flagyl   procalcitonin repeated today trending down   Continue current abx  Plan to dc vanc in am   Continue to monitor procal for response   Blood cultures no growth  Will start PT/OT prior to discharge  Discussion was had with son who states he can come down and drive patient up to the [facility]   Informed son that if patient is stable for discharge he can likely fly home    Looks quiet sick and frail, still has congestion, very weak and easily dyspneic  Continue present care  Prognosis guarded to poor.    11/3- improving  Continue present care    11/4- improving, continue  present care    New onset of congestive heart failure  11/1:  Reviewed echo  No history of chf reported  EF of 30% here  Will consult cards on case    On IV lasix, dose reduced      Physical deconditioning  11/1:  Will start PT/OT/ST to evaluate for any possible needs     Critical Care Myopathy    Continue PT/OT      Parkinson disease  10/29:  Resume home meds      Panlobular emphysema  On nebs      Acute respiratory failure with hypoxia  10/29:  Likely 2/2 to aspiration PNA  Sputum culture pending  Blood culture pending  On vanc, cefepime, and flagyl  Continue to wean vent as tolerated   Reviewed blood gas     10/30:  Currently on vent   Sputum cultures pending  Continue current antibiotics   Wean vent as tolerated   Reviewed blood gas today  Will order procal for trend in am     S/p vent support, continue supportive care  Continue steroids    Improving    Will check Home o2 eval in am      Atrial fibrillation with RVR  10/29:  Not on rate control medical medications at home  HR ok for now  On coumadin   Pharm to dose   Metoprolol IV PRN     10/30:  Rate controlled     10/31:  Controlled     11/1:  Rate controlled, coumadin managed by pharm     Remains in Afib, rate controlled      VTE Risk Mitigation (From admission, onward)         Ordered     warfarin (COUMADIN) tablet 2.5 mg  Daily      11/02/19 1041     Place sequential compression device  Until discontinued      10/29/19 1630     IP VTE HIGH RISK PATIENT  Once      10/29/19 1630                      Kalen Allan MD  Department of Hospital Medicine   [facility]    Detail Level: Detailed Quality 130: Documentation Of Current Medications In The Medical Record: Current Medications Documented

## 2023-07-18 NOTE — PHYSICIAN QUERY
PT Name: Shaq Huffman  MR #: [id number]     Physician Query Form - Diagnosis Clarification      CDS/: GERI Brumfield, RN, CDS               Contact information:[email or web address]    This form is a permanent document in the medical record.     Query Date: November 13, 2019    By submitting this query, we are merely seeking further clarification of documentation.  Please utilize your independent clinical judgment when addressing the question(s) below.     The medical record contains the following:      Findings Supporting Clinical Information Location in Medical Record   UTI (urinary tract infection) UTI (urinary tract infection)  - UA consistent with infectious process   - Plan to DC zuluaga in AM  - Continue IV ABX  - Use cultures to guide therapy    UC shows NGTD.  Will continue Rocephin and Azithromycin for PNA      Color, UA Yellow   Appearance, UA Clear   Specific Gravity, UA >=1.030 (A)   pH, UA 5.0   Protein, UA 2+ (A)   Glucose, UA Negative   Ketones, UA Trace (A)   Occult Blood UA 3+ (A)   NITRITE UA Positive (A)   UROBILINOGEN UA Negative   Bilirubin (UA) 1+ (A)   Leukocytes, UA Negative   RBC, UA >100 (H)   WBC, UA 5   Bacteria, UA Occasional       Urine culture: No growth   MARCO ANTONIO MCCAIN DNP, JUAN J/ Dr. Franco, 11/11          MARCO ANTONIO MCCAIN DNP, ACNP-BC/ Dr. Franco, 11/12      Lab 11/10                                    Lab 11/10     Please clarify if the UTI (urinary tract infection) diagnosis has been:    [ X ] Ruled In   [  ] Ruled In, Now Resolved   [  ] Ruled Out   [  ] Other/Clarification of findings (please specify):     [  ] Clinically undetermined     Please document in your progress notes daily for the duration of treatment, until resolved, and include in your discharge summary.                                                                                 16

## 2023-08-17 NOTE — ASSESSMENT & PLAN NOTE
10/29:  Not on rate control medical medications at home  HR ok for now  On coumadin   Pharm to dose   Metoprolol IV PRN     10/30:  Rate controlled     10/31:  Controlled     11/1:  Rate controlled, coumadin managed by pharm     Remains in Afib, rate controlled    Rate controlled   GENERAL PRE-PROCEDURE:   Procedure:  Cors +/- PCI, RHC  Date/Time:  8/17/2023 8:13 AM    Verbal consent obtained?: Yes    Written consent obtained?: Yes    Risks and benefits: Risks, benefits and alternatives were discussed    Consent given by:  Patient  Patient states understanding of procedure being performed: Yes    Patient's understanding of procedure matches consent: Yes    Procedure consent matches procedure scheduled: Yes    Expected level of sedation:  Moderate  Appropriately NPO:  Yes  Mallampati  :  Grade 3- soft palate visible, posterior pharyngeal wall not visible  Lungs:  Lungs clear with good breath sounds bilaterally  Heart:  Systolic murmur  History & Physical reviewed:  History and physical reviewed and no updates needed  Statement of review:  I have reviewed the lab findings, diagnostic data, medications, and the plan for sedation

## 2024-03-01 NOTE — SUBJECTIVE & OBJECTIVE
Interval History: Patient did not have any issues overnight. Tolerating being on nasal canula.     Review of Systems   Constitutional: Negative for fatigue and fever.   Respiratory: Negative for shortness of breath.    Cardiovascular: Negative for chest pain.   Gastrointestinal: Negative for nausea and vomiting.   Skin: Negative for rash.     Objective:     Vital Signs (Most Recent):  Temp: 98 °F (36.7 °C) (11/01/19 1105)  Pulse: 105 (11/01/19 1105)  Resp: (!) 26 (11/01/19 1105)  BP: (!) 146/90 (11/01/19 1105)  SpO2: 97 % (11/01/19 1105) Vital Signs (24h Range):  Temp:  [98 °F (36.7 °C)-99.2 °F (37.3 °C)] 98 °F (36.7 °C)  Pulse:  [] 105  Resp:  [21-35] 26  SpO2:  [90 %-98 %] 97 %  BP: ()/() 146/90     Weight: 77.9 kg (171 lb 11.8 oz)  Body mass index is 21.47 kg/m².    Intake/Output Summary (Last 24 hours) at 11/1/2019 1145  Last data filed at 11/1/2019 1100  Gross per 24 hour   Intake 1690 ml   Output 1330 ml   Net 360 ml      Physical Exam   Constitutional: No distress.   Lethargic, thin appearance    HENT:   Head: Normocephalic and atraumatic.   Cardiovascular: Exam reveals no gallop and no friction rub.   No murmur heard.  Irregular irregular   Pulmonary/Chest: Effort normal. No stridor. No respiratory distress. He has no wheezes. He has rales (RLL).   Abdominal: Soft. Bowel sounds are normal. He exhibits no distension and no mass. There is no tenderness. There is no guarding.   Musculoskeletal: He exhibits no edema.   Neurological:   sedated   Skin: Skin is warm. No rash noted. He is not diaphoretic.       Significant Labs:   Recent Lab Results       11/01/19 0827 11/01/19  0354        Procalcitonin 13.06  Comment:  A concentration < 0.25 ng/mL represents a low risk bacterial   infection.  Procalcitonin may not be accurate among patients with localized   infection, recent trauma or major surgery, immunosuppressed state,   invasive fungal infection, renal dysfunction. Decisions regarding  See 3/1/2024 Triage Encounter.      initiation or continuation of antibiotic therapy should not be based   solely on procalcitonin levels.         Anion Gap   8     Baso #   0.02     Basophil%   0.2     BUN, Bld   53     Calcium   9.1     Chloride   112     CO2   21     Creatinine   1.2     Differential Method   Automated     eGFR if    >60     eGFR if non    54  Comment:  Calculation used to obtain the estimated glomerular filtration  rate (eGFR) is the CKD-EPI equation.        Eos #   0.2     Eosinophil%   1.5     Glucose   128     Gran # (ANC)   10.2     Gran%   89.9     Hematocrit   34.5     Hemoglobin   11.4     Immature Grans (Abs)   0.07  Comment:  Mild elevation in immature granulocytes is non specific and   can be seen in a variety of conditions including stress response,   acute inflammation, trauma and pregnancy. Correlation with other   laboratory and clinical findings is essential.       Immature Granulocytes   0.6     Coumadin Monitoring INR   4.2  Comment:  Coumadin Therapy:  2.0 - 3.0 for INR for all indicators except mechanical heart valves  and antiphospholipid syndromes which should use 2.5 - 3.5.       Lymph #   0.2     Lymph%   2.1     Magnesium   2.0     MCH   32.8     MCHC   33.0     MCV   99  Comment:  Results confirmed, test repeated     Mono #   0.7     Mono%   5.7     MPV   10.3     nRBC   0     Platelets   111     Potassium   3.9     Protime   43.4     RBC   3.48     RDW   13.9     Sodium   141     WBC   11.37           Significant Imaging: I have reviewed all pertinent imaging results/findings within the past 24 hours.

## 2024-12-17 NOTE — PLAN OF CARE
Please see what is going on.  Recommendations     Recommendation: 1.Continue current diet. 2. Add boost plus BID. 3. RD to f/u.   Goals: Meet > 85 % EEN/EPN by RD f/u   Nutrition Goal Status: progressing towards goal   Communication of RD Recs: (POC, sticky note)
